# Patient Record
Sex: MALE | Race: WHITE | NOT HISPANIC OR LATINO | Employment: OTHER | URBAN - METROPOLITAN AREA
[De-identification: names, ages, dates, MRNs, and addresses within clinical notes are randomized per-mention and may not be internally consistent; named-entity substitution may affect disease eponyms.]

---

## 2017-03-14 ENCOUNTER — HOSPITAL ENCOUNTER (EMERGENCY)
Facility: HOSPITAL | Age: 57
Discharge: HOME | End: 2017-03-14
Attending: EMERGENCY MEDICINE | Admitting: EMERGENCY MEDICINE

## 2017-03-14 VITALS
SYSTOLIC BLOOD PRESSURE: 145 MMHG | BODY MASS INDEX: 36.53 KG/M2 | OXYGEN SATURATION: 95 % | HEART RATE: 55 BPM | RESPIRATION RATE: 18 BRPM | TEMPERATURE: 97.6 F | HEIGHT: 76 IN | WEIGHT: 300 LBS | DIASTOLIC BLOOD PRESSURE: 85 MMHG

## 2017-03-14 DIAGNOSIS — I10 ESSENTIAL HYPERTENSION: Primary | ICD-10-CM

## 2017-03-14 LAB
ALBUMIN SERPL-MCNC: 4 G/DL (ref 3.5–5.2)
ALBUMIN/GLOB SERPL: 1.4 G/DL
ALP SERPL-CCNC: 45 U/L (ref 39–117)
ALT SERPL W P-5'-P-CCNC: 38 U/L (ref 1–41)
ANION GAP SERPL CALCULATED.3IONS-SCNC: 16.7 MMOL/L
AST SERPL-CCNC: 22 U/L (ref 1–40)
BASOPHILS # BLD AUTO: 0.02 10*3/MM3 (ref 0–0.2)
BASOPHILS NFR BLD AUTO: 0.3 % (ref 0–1.5)
BILIRUB SERPL-MCNC: 0.5 MG/DL (ref 0.1–1.2)
BUN BLD-MCNC: 13 MG/DL (ref 6–20)
BUN/CREAT SERPL: 12.9 (ref 7–25)
CALCIUM SPEC-SCNC: 9.2 MG/DL (ref 8.6–10.5)
CHLORIDE SERPL-SCNC: 101 MMOL/L (ref 98–107)
CO2 SERPL-SCNC: 24.3 MMOL/L (ref 22–29)
CREAT BLD-MCNC: 1.01 MG/DL (ref 0.76–1.27)
DEPRECATED RDW RBC AUTO: 39.9 FL (ref 37–54)
EOSINOPHIL # BLD AUTO: 0.09 10*3/MM3 (ref 0–0.7)
EOSINOPHIL NFR BLD AUTO: 1.3 % (ref 0.3–6.2)
ERYTHROCYTE [DISTWIDTH] IN BLOOD BY AUTOMATED COUNT: 12.6 % (ref 11.5–14.5)
GFR SERPL CREATININE-BSD FRML MDRD: 77 ML/MIN/1.73
GLOBULIN UR ELPH-MCNC: 2.8 GM/DL
GLUCOSE BLD-MCNC: 143 MG/DL (ref 65–99)
HCT VFR BLD AUTO: 39.8 % (ref 40.4–52.2)
HGB BLD-MCNC: 14 G/DL (ref 13.7–17.6)
IMM GRANULOCYTES # BLD: 0.04 10*3/MM3 (ref 0–0.03)
IMM GRANULOCYTES NFR BLD: 0.6 % (ref 0–0.5)
LYMPHOCYTES # BLD AUTO: 1.38 10*3/MM3 (ref 0.9–4.8)
LYMPHOCYTES NFR BLD AUTO: 20.2 % (ref 19.6–45.3)
MCH RBC QN AUTO: 30.4 PG (ref 27–32.7)
MCHC RBC AUTO-ENTMCNC: 35.2 G/DL (ref 32.6–36.4)
MCV RBC AUTO: 86.3 FL (ref 79.8–96.2)
MONOCYTES # BLD AUTO: 0.64 10*3/MM3 (ref 0.2–1.2)
MONOCYTES NFR BLD AUTO: 9.4 % (ref 5–12)
NEUTROPHILS # BLD AUTO: 4.66 10*3/MM3 (ref 1.9–8.1)
NEUTROPHILS NFR BLD AUTO: 68.2 % (ref 42.7–76)
PLATELET # BLD AUTO: 145 10*3/MM3 (ref 140–500)
PMV BLD AUTO: 11.2 FL (ref 6–12)
POTASSIUM BLD-SCNC: 3.7 MMOL/L (ref 3.5–5.2)
PROT SERPL-MCNC: 6.8 G/DL (ref 6–8.5)
RBC # BLD AUTO: 4.61 10*6/MM3 (ref 4.6–6)
SODIUM BLD-SCNC: 142 MMOL/L (ref 136–145)
WBC NRBC COR # BLD: 6.83 10*3/MM3 (ref 4.5–10.7)

## 2017-03-14 PROCEDURE — 99284 EMERGENCY DEPT VISIT MOD MDM: CPT

## 2017-03-14 PROCEDURE — 80053 COMPREHEN METABOLIC PANEL: CPT | Performed by: EMERGENCY MEDICINE

## 2017-03-14 PROCEDURE — 25010000002 HYDRALAZINE PER 20 MG: Performed by: EMERGENCY MEDICINE

## 2017-03-14 PROCEDURE — 85025 COMPLETE CBC W/AUTO DIFF WBC: CPT | Performed by: EMERGENCY MEDICINE

## 2017-03-14 PROCEDURE — 96374 THER/PROPH/DIAG INJ IV PUSH: CPT

## 2017-03-14 RX ORDER — HYDRALAZINE HYDROCHLORIDE 20 MG/ML
20 INJECTION INTRAMUSCULAR; INTRAVENOUS ONCE
Status: COMPLETED | OUTPATIENT
Start: 2017-03-14 | End: 2017-03-14

## 2017-03-14 RX ORDER — IBUPROFEN 800 MG/1
800 TABLET ORAL ONCE
Status: COMPLETED | OUTPATIENT
Start: 2017-03-14 | End: 2017-03-14

## 2017-03-14 RX ORDER — SODIUM CHLORIDE 0.9 % (FLUSH) 0.9 %
10 SYRINGE (ML) INJECTION AS NEEDED
Status: DISCONTINUED | OUTPATIENT
Start: 2017-03-14 | End: 2017-03-14 | Stop reason: HOSPADM

## 2017-03-14 RX ADMIN — IBUPROFEN 800 MG: 800 TABLET ORAL at 05:23

## 2017-03-14 RX ADMIN — HYDRALAZINE HYDROCHLORIDE 20 MG: 20 INJECTION INTRAMUSCULAR; INTRAVENOUS at 03:06

## 2017-03-14 NOTE — ED NOTES
"Patient states \"My head's about to blow off. I checked my blood pressure at home and it was 195/96\"     Leticia Osorio, RN  03/14/17 0027    "

## 2017-03-15 ENCOUNTER — OFFICE VISIT (OUTPATIENT)
Dept: FAMILY MEDICINE CLINIC | Facility: CLINIC | Age: 57
End: 2017-03-15

## 2017-03-15 VITALS
WEIGHT: 305 LBS | HEIGHT: 76 IN | OXYGEN SATURATION: 96 % | BODY MASS INDEX: 37.14 KG/M2 | TEMPERATURE: 98.2 F | RESPIRATION RATE: 20 BRPM | SYSTOLIC BLOOD PRESSURE: 158 MMHG | DIASTOLIC BLOOD PRESSURE: 90 MMHG | HEART RATE: 62 BPM

## 2017-03-15 DIAGNOSIS — R73.9 HYPERGLYCEMIA: ICD-10-CM

## 2017-03-15 DIAGNOSIS — E78.2 MIXED HYPERLIPIDEMIA: Primary | ICD-10-CM

## 2017-03-15 DIAGNOSIS — K21.9 GASTROESOPHAGEAL REFLUX DISEASE WITHOUT ESOPHAGITIS: ICD-10-CM

## 2017-03-15 DIAGNOSIS — I10 ESSENTIAL HYPERTENSION: ICD-10-CM

## 2017-03-15 PROCEDURE — 99214 OFFICE O/P EST MOD 30 MIN: CPT | Performed by: FAMILY MEDICINE

## 2017-03-15 RX ORDER — CLONIDINE HYDROCHLORIDE 0.1 MG/1
0.1 TABLET ORAL NIGHTLY
Qty: 30 TABLET | Refills: 5 | Status: SHIPPED | OUTPATIENT
Start: 2017-03-15 | End: 2021-04-16

## 2017-03-15 NOTE — PATIENT INSTRUCTIONS
This is a very nice gentleman who has an A1c of 6.1%, representing pre-diabetes.  I would like him to exercise and lose weight and cut back on carbohydrates.  He also has elevated blood pressure but I have added clonidine.  I would like him to call if there is any problem.

## 2017-03-15 NOTE — PROGRESS NOTES
Subjective   Gerard Gonzalez is a 56 y.o. male presenting with   Chief Complaint   Patient presents with   • Follow-up     ER f/u for elevated blood pressure   states BP was 149/72 this morning         HPI Comments: This is an obese 56-year-old gentleman who is a nonsmoker but went to [place] for vacation and ate a lot including a lot of sugar and when he came back he developed a headache and found his blood pressure to be very elevated so he went to the emergency department.  He was found to have a blood sugar of 143 and he had been fasting for approximately 6 hours when that was drawn.  He was given hydralazine and his blood pressure came down.  He says that his mother has the same problem was spiking blood pressures and she takes when necessary clonidine.  I told him this was reasonable since he can take his blood pressure anytime he wants.  I did however point out that in the last 7 months he has gained 9 pounds and he already was quite overweight before that.  His BMI is now calculated at 37.    He tells me he can tell when his blood pressure goes up because he gets a headache.  Fortunately he does not have any neurologic symptoms such as weakness or numbness or confusion or difficulty speaking or difficulty controlling his bowel or bladder.  He also reassures me that he does not have any chest pain or palpitations.       The following portions of the patient's history were reviewed and updated as appropriate: current medications, past family history, past medical history, past social history, past surgical history and problem list.    Review of Systems   Neurological: Positive for headaches.   All other systems reviewed and are negative.      Objective   Physical Exam   Constitutional: He is oriented to person, place, and time. He appears well-developed and well-nourished. No distress.   HENT:   Head: Normocephalic and atraumatic.   Mouth/Throat: Oropharynx is clear and moist. No oropharyngeal exudate.    Eyes: EOM are normal. Pupils are equal, round, and reactive to light. No scleral icterus.   Neck: Normal range of motion. Neck supple. No thyromegaly present.   Cardiovascular: Normal rate, regular rhythm, normal heart sounds and intact distal pulses.  Exam reveals no friction rub.    No murmur heard.  Pulmonary/Chest: Effort normal and breath sounds normal.   Musculoskeletal: Normal range of motion. He exhibits edema (trace pitting ankle edema).   Lymphadenopathy:     He has no cervical adenopathy.   Neurological: He is alert and oriented to person, place, and time. No cranial nerve deficit. Coordination normal.   Skin: Skin is warm and dry. No rash noted. He is not diaphoretic.   Psychiatric: He has a normal mood and affect. His behavior is normal.   Nursing note and vitals reviewed.      Assessment/Plan   Gerard was seen today for follow-up.    Diagnoses and all orders for this visit:    Mixed hyperlipidemia    Essential hypertension    Gastroesophageal reflux disease without esophagitis    Hyperglycemia    Other orders  -     CloNIDine (CATAPRES) 0.1 MG tablet; Take 1 tablet by mouth Every Night.                   I would like him to return for another visit in 3 month(s)

## 2017-03-16 ENCOUNTER — DOCUMENTATION (OUTPATIENT)
Dept: SOCIAL WORK | Facility: HOSPITAL | Age: 57
End: 2017-03-16

## 2017-04-26 ENCOUNTER — OFFICE VISIT (OUTPATIENT)
Dept: FAMILY MEDICINE CLINIC | Facility: CLINIC | Age: 57
End: 2017-04-26

## 2017-04-26 VITALS
SYSTOLIC BLOOD PRESSURE: 135 MMHG | DIASTOLIC BLOOD PRESSURE: 82 MMHG | TEMPERATURE: 97.1 F | OXYGEN SATURATION: 96 % | HEIGHT: 75 IN | WEIGHT: 300 LBS | HEART RATE: 53 BPM | BODY MASS INDEX: 37.3 KG/M2

## 2017-04-26 DIAGNOSIS — E78.2 MIXED HYPERLIPIDEMIA: ICD-10-CM

## 2017-04-26 DIAGNOSIS — K21.9 GASTROESOPHAGEAL REFLUX DISEASE WITHOUT ESOPHAGITIS: ICD-10-CM

## 2017-04-26 DIAGNOSIS — R73.9 HYPERGLYCEMIA: ICD-10-CM

## 2017-04-26 DIAGNOSIS — I10 ESSENTIAL HYPERTENSION: ICD-10-CM

## 2017-04-26 DIAGNOSIS — Z00.00 ANNUAL PHYSICAL EXAM: Primary | ICD-10-CM

## 2017-04-26 LAB
CHOLEST SERPL-MCNC: 201 MG/DL (ref 0–200)
HBA1C MFR BLD: 5.66 % (ref 4.8–5.6)
HDLC SERPL-MCNC: 34 MG/DL (ref 40–60)
LDLC SERPL CALC-MCNC: 116 MG/DL (ref 0–100)
LDLC/HDLC SERPL: 3.41 {RATIO}
PSA SERPL-MCNC: 1.82 NG/ML (ref 0–4)
T4 FREE SERPL-MCNC: 1.12 NG/DL (ref 0.93–1.7)
TRIGL SERPL-MCNC: 255 MG/DL (ref 0–150)
TSH SERPL DL<=0.005 MIU/L-ACNC: 0.72 MIU/ML (ref 0.27–4.2)
VLDLC SERPL CALC-MCNC: 51 MG/DL (ref 5–40)

## 2017-04-26 PROCEDURE — 99396 PREV VISIT EST AGE 40-64: CPT | Performed by: FAMILY MEDICINE

## 2017-04-26 RX ORDER — ASPIRIN 81 MG/1
81 TABLET ORAL DAILY
COMMUNITY

## 2017-04-26 RX ORDER — AMPICILLIN TRIHYDRATE 250 MG
1000 CAPSULE ORAL DAILY
COMMUNITY

## 2017-04-26 NOTE — PATIENT INSTRUCTIONS
This is a very nice 56-year-old who is here for routine annual examination.  I will request blood work and notify him when the results are available.  I would like him to call if there is no problem.

## 2017-04-26 NOTE — PROGRESS NOTES
Subjective   Gerard Gonzalez is a 56 y.o. male presenting with   Chief Complaint   Patient presents with   • Annual Exam     pt is fasting but wants to talk about the labs because of his insurance        HPI Comments: This is a 56-year-old  white male nonsmoker who is here for routine annual examination.  He had his colonoscopy last year so he is up-to-date on that.  He had a CBC and CMP in March so he does not want to have that repeated in case it should cause him to have a large bill.  He is fasting for other lab.    He tells me that sometimes in the mornings when he smells something he has a coughing spell and he thinks it is allergies.  I recommended he try taking Zyrtec at night and see if that will help.       The following portions of the patient's history were reviewed and updated as appropriate: current medications, past family history, past medical history, past social history, past surgical history and problem list.    Review of Systems   All other systems reviewed and are negative.      Objective   Physical Exam   Constitutional: He is oriented to person, place, and time. He appears well-developed and well-nourished. No distress.   HENT:   Head: Normocephalic and atraumatic.   Right Ear: External ear normal.   Left Ear: External ear normal.   Mouth/Throat: Oropharynx is clear and moist. No oropharyngeal exudate.   Eyes: EOM are normal. Pupils are equal, round, and reactive to light. No scleral icterus.   Neck: Normal range of motion. Neck supple. No thyromegaly present.   Cardiovascular: Normal rate, regular rhythm, normal heart sounds and intact distal pulses.  Exam reveals no friction rub.    No murmur heard.  Pulmonary/Chest: Effort normal and breath sounds normal. No respiratory distress. He has no wheezes. He exhibits no tenderness.   Abdominal: Soft. Bowel sounds are normal. He exhibits no distension and no mass. There is no tenderness. There is no guarding. Hernia confirmed negative in the  right inguinal area and confirmed negative in the left inguinal area.       Genitourinary: Prostate normal, testes normal and penis normal. Prostate is not enlarged and not tender. Right testis shows no mass and no tenderness. Left testis shows no mass and no tenderness. Circumcised.   Musculoskeletal: Normal range of motion. He exhibits no edema or tenderness.   Lymphadenopathy:     He has no cervical adenopathy. No inguinal adenopathy noted on the right or left side.   Neurological: He is alert and oriented to person, place, and time. No cranial nerve deficit. Coordination normal.   Skin: Skin is warm and dry. Rash: many moles but none look questionable. He is not diaphoretic.   Psychiatric: He has a normal mood and affect. His behavior is normal.   Nursing note and vitals reviewed.      Assessment/Plan   Gerard was seen today for annual exam.    Diagnoses and all orders for this visit:    Annual physical exam  -     PSA  -     Lipid Panel With LDL / HDL Ratio  -     TSH  -     T4, Free  -     Hemoglobin A1c    Mixed hyperlipidemia  -     Lipid Panel With LDL / HDL Ratio    Essential hypertension  -     TSH    Gastroesophageal reflux disease without esophagitis    Hyperglycemia  -     Hemoglobin A1c                   I would like him to return for another visit in 6 month(s)

## 2017-06-26 DIAGNOSIS — I10 ESSENTIAL HYPERTENSION: ICD-10-CM

## 2017-06-26 DIAGNOSIS — K21.9 GASTROESOPHAGEAL REFLUX DISEASE WITHOUT ESOPHAGITIS: ICD-10-CM

## 2017-06-26 RX ORDER — TRIAMTERENE AND HYDROCHLOROTHIAZIDE 37.5; 25 MG/1; MG/1
CAPSULE ORAL
Qty: 90 CAPSULE | Refills: 0 | Status: SHIPPED | OUTPATIENT
Start: 2017-06-26 | End: 2017-09-11 | Stop reason: SDUPTHER

## 2017-06-26 RX ORDER — LOSARTAN POTASSIUM 100 MG/1
TABLET ORAL
Qty: 90 TABLET | Refills: 0 | Status: SHIPPED | OUTPATIENT
Start: 2017-06-26 | End: 2017-09-11 | Stop reason: SDUPTHER

## 2017-08-28 ENCOUNTER — OFFICE VISIT (OUTPATIENT)
Dept: FAMILY MEDICINE CLINIC | Facility: CLINIC | Age: 57
End: 2017-08-28

## 2017-08-28 VITALS
BODY MASS INDEX: 39.17 KG/M2 | HEART RATE: 54 BPM | TEMPERATURE: 98.5 F | HEIGHT: 75 IN | SYSTOLIC BLOOD PRESSURE: 116 MMHG | OXYGEN SATURATION: 97 % | DIASTOLIC BLOOD PRESSURE: 76 MMHG | WEIGHT: 315 LBS

## 2017-08-28 DIAGNOSIS — E78.2 MIXED HYPERLIPIDEMIA: ICD-10-CM

## 2017-08-28 DIAGNOSIS — M25.522 PAIN OF BOTH ELBOWS: ICD-10-CM

## 2017-08-28 DIAGNOSIS — I10 ESSENTIAL HYPERTENSION: ICD-10-CM

## 2017-08-28 DIAGNOSIS — M25.511 ACUTE PAIN OF RIGHT SHOULDER: Primary | ICD-10-CM

## 2017-08-28 DIAGNOSIS — M25.521 PAIN OF BOTH ELBOWS: ICD-10-CM

## 2017-08-28 PROCEDURE — 99213 OFFICE O/P EST LOW 20 MIN: CPT | Performed by: FAMILY MEDICINE

## 2017-08-28 RX ORDER — METHYLPREDNISOLONE 4 MG/1
TABLET ORAL
Qty: 21 TABLET | Refills: 0 | Status: SHIPPED | OUTPATIENT
Start: 2017-08-28 | End: 2018-03-09

## 2017-08-28 NOTE — PATIENT INSTRUCTIONS
This is a very nice 57-year-old who has chronic right shoulder pain and bilateral elbow pain.  I will request physical therapy evaluation and treatment, but if he does not get better I would like him to call and I will refer him to an orthopedist.

## 2017-08-28 NOTE — PROGRESS NOTES
Subjective   Gerard Gonzalez is a 57 y.o. male presenting with   Chief Complaint   Patient presents with   • Elbow Pain     bilateral elbow  but the right elbow is worse    • Shoulder Pain     bliateral shoulder but the right shoulder is worse          HPI Comments: 57-year-old white male dear Andre comes in today with significant bilateral elbow pain and right shoulder pain.  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  He thinks that is what has caused his joint soreness.  He is having significant trouble using his compound bowl and therefore would like a note signed to allow him to use a crossbow.    He denies any arm weakness or numbness or any chest pain or neck pain.       The following portions of the patient's history were reviewed and updated as appropriate: current medications, past family history, past medical history, past social history, past surgical history and problem list.    Review of Systems   Musculoskeletal: Positive for arthralgias.   All other systems reviewed and are negative.      Objective   Physical Exam   Constitutional: He is oriented to person, place, and time. He appears well-developed and well-nourished. No distress.   HENT:   Head: Normocephalic and atraumatic.   Eyes: EOM are normal. Pupils are equal, round, and reactive to light.   Neck: Normal range of motion. Neck supple.   Musculoskeletal: He exhibits tenderness. He exhibits no edema or deformity.        Right shoulder: He exhibits tenderness (he has pain to full extension of his right shoulder) and crepitus. He exhibits normal range of motion, no swelling and no effusion.        Right forearm: He exhibits tenderness (he has tenderness over the lateral epicondyles but full range of motion with no deformity).   Neurological: He is alert and oriented to person, place, and time. No cranial nerve deficit. Coordination normal.   Skin: Skin is warm and dry. He is not diaphoretic.   Psychiatric: He has a normal mood and  affect. His behavior is normal.   Nursing note and vitals reviewed.      Assessment/Plan   Gerard was seen today for elbow pain and shoulder pain.    Diagnoses and all orders for this visit:    Acute pain of right shoulder  -     Ambulatory Referral to Physical Therapy Evaluate and treat    Pain of both elbows  -     Ambulatory Referral to Physical Therapy Evaluate and treat    Other orders  -     MethylPREDNISolone (MEDROL, EMIYL,) 4 MG tablet; Take as directed on package instructions.                   I would like him to return for another visit in 6 month(s)

## 2017-09-11 DIAGNOSIS — K21.9 GASTROESOPHAGEAL REFLUX DISEASE WITHOUT ESOPHAGITIS: ICD-10-CM

## 2017-09-11 DIAGNOSIS — I10 ESSENTIAL HYPERTENSION: ICD-10-CM

## 2017-09-12 RX ORDER — TRIAMTERENE AND HYDROCHLOROTHIAZIDE 37.5; 25 MG/1; MG/1
CAPSULE ORAL
Qty: 90 CAPSULE | Refills: 0 | Status: SHIPPED | OUTPATIENT
Start: 2017-09-12 | End: 2017-12-04 | Stop reason: SDUPTHER

## 2017-09-12 RX ORDER — LOSARTAN POTASSIUM 100 MG/1
TABLET ORAL
Qty: 90 TABLET | Refills: 0 | Status: SHIPPED | OUTPATIENT
Start: 2017-09-12 | End: 2017-12-04 | Stop reason: SDUPTHER

## 2017-12-04 DIAGNOSIS — I10 ESSENTIAL HYPERTENSION: ICD-10-CM

## 2017-12-04 DIAGNOSIS — K21.9 GASTROESOPHAGEAL REFLUX DISEASE WITHOUT ESOPHAGITIS: ICD-10-CM

## 2017-12-04 RX ORDER — TRIAMTERENE AND HYDROCHLOROTHIAZIDE 37.5; 25 MG/1; MG/1
CAPSULE ORAL
Qty: 90 CAPSULE | Refills: 0 | Status: SHIPPED | OUTPATIENT
Start: 2017-12-04 | End: 2018-03-09 | Stop reason: SDUPTHER

## 2017-12-04 RX ORDER — ATENOLOL 100 MG/1
TABLET ORAL
Qty: 90 TABLET | Refills: 0 | Status: SHIPPED | OUTPATIENT
Start: 2017-12-04 | End: 2018-03-09 | Stop reason: SDUPTHER

## 2017-12-04 RX ORDER — LOSARTAN POTASSIUM 100 MG/1
TABLET ORAL
Qty: 90 TABLET | Refills: 0 | Status: SHIPPED | OUTPATIENT
Start: 2017-12-04 | End: 2018-03-09 | Stop reason: SDUPTHER

## 2017-12-21 RX ORDER — BACLOFEN 10 MG/1
TABLET ORAL
Qty: 30 TABLET | Refills: 1 | Status: SHIPPED | OUTPATIENT
Start: 2017-12-21 | End: 2019-11-22

## 2017-12-21 RX ORDER — CYCLOBENZAPRINE HCL 10 MG
TABLET ORAL
Qty: 20 TABLET | Refills: 2 | Status: SHIPPED | OUTPATIENT
Start: 2017-12-21 | End: 2019-11-22

## 2018-03-09 ENCOUNTER — OFFICE VISIT (OUTPATIENT)
Dept: FAMILY MEDICINE CLINIC | Facility: CLINIC | Age: 58
End: 2018-03-09

## 2018-03-09 VITALS
SYSTOLIC BLOOD PRESSURE: 148 MMHG | TEMPERATURE: 98 F | WEIGHT: 315 LBS | OXYGEN SATURATION: 98 % | DIASTOLIC BLOOD PRESSURE: 86 MMHG | RESPIRATION RATE: 16 BRPM | BODY MASS INDEX: 39.17 KG/M2 | HEIGHT: 75 IN | HEART RATE: 60 BPM

## 2018-03-09 DIAGNOSIS — K21.9 GASTROESOPHAGEAL REFLUX DISEASE WITHOUT ESOPHAGITIS: ICD-10-CM

## 2018-03-09 DIAGNOSIS — G89.29 CHRONIC MIDLINE LOW BACK PAIN WITHOUT SCIATICA: ICD-10-CM

## 2018-03-09 DIAGNOSIS — M54.50 CHRONIC MIDLINE LOW BACK PAIN WITHOUT SCIATICA: ICD-10-CM

## 2018-03-09 DIAGNOSIS — I10 ESSENTIAL HYPERTENSION: ICD-10-CM

## 2018-03-09 DIAGNOSIS — R73.9 HYPERGLYCEMIA: ICD-10-CM

## 2018-03-09 DIAGNOSIS — E78.2 MIXED HYPERLIPIDEMIA: Primary | ICD-10-CM

## 2018-03-09 DIAGNOSIS — R06.83 SNORING: ICD-10-CM

## 2018-03-09 PROCEDURE — 99214 OFFICE O/P EST MOD 30 MIN: CPT | Performed by: FAMILY MEDICINE

## 2018-03-09 RX ORDER — ATENOLOL 100 MG/1
100 TABLET ORAL DAILY
Qty: 90 TABLET | Refills: 1 | Status: SHIPPED | OUTPATIENT
Start: 2018-03-09 | End: 2018-03-12 | Stop reason: SDUPTHER

## 2018-03-09 RX ORDER — LOSARTAN POTASSIUM 100 MG/1
100 TABLET ORAL DAILY
Qty: 90 TABLET | Refills: 1 | Status: SHIPPED | OUTPATIENT
Start: 2018-03-09 | End: 2018-03-12 | Stop reason: SDUPTHER

## 2018-03-09 RX ORDER — TRIAMTERENE AND HYDROCHLOROTHIAZIDE 37.5; 25 MG/1; MG/1
1 CAPSULE ORAL EVERY MORNING
Qty: 90 CAPSULE | Refills: 1 | Status: SHIPPED | OUTPATIENT
Start: 2018-03-09 | End: 2018-03-12 | Stop reason: SDUPTHER

## 2018-03-09 RX ORDER — SCOLOPAMINE TRANSDERMAL SYSTEM 1 MG/1
1 PATCH, EXTENDED RELEASE TRANSDERMAL
Qty: 4 PATCH | Refills: 1 | Status: SHIPPED | OUTPATIENT
Start: 2018-03-09 | End: 2019-11-22

## 2018-03-09 RX ORDER — TRAMADOL HYDROCHLORIDE 50 MG/1
50 TABLET ORAL 2 TIMES DAILY PRN
Qty: 60 TABLET | Refills: 2 | Status: SHIPPED | OUTPATIENT
Start: 2018-03-09 | End: 2019-11-22

## 2018-03-09 NOTE — PROGRESS NOTES
Subjective   Gerard Gonzalez is a 57 y.o. male presenting with   Chief Complaint   Patient presents with   • Hypertension     med refill        HPI Comments: This is a 57-year-old  white male nonsmoker who is getting ready to go on a paid hunt for mountain lion and says if he gets 1 he is going to have a cardinal in its mouth since he is a very avid [facility] fan.    He is here for routine follow-up for his blood pressure and cholesterol and blood sugar and sleep apnea and he tells me that he has been snoring a lot lately and he is not using any sort of sleep apnea device.  He needs to have this addressed since I pointed out this could be causing a problem with his blood sugar and blood pressure and weight gain.  He said he would go for testing.    His blood pressure is not very well controlled today but he admits that he has not been doing any sort of exercise lately and he is not on any particular diet.  Apparently he lost weight since I saw him last August, only to gain it all back again.    Hypertension          The following portions of the patient's history were reviewed and updated as appropriate: current medications, past family history, past medical history, past social history, past surgical history and problem list.    Review of Systems   Musculoskeletal: Positive for back pain.   All other systems reviewed and are negative.      Objective   Physical Exam   Constitutional: He is oriented to person, place, and time. He appears well-developed and well-nourished. No distress.   HENT:   Head: Normocephalic and atraumatic.   Mouth/Throat: Oropharynx is clear and moist. No oropharyngeal exudate.   Eyes: EOM are normal. Pupils are equal, round, and reactive to light.   Neck: Normal range of motion. Neck supple. No thyromegaly present.   Cardiovascular: Normal rate, regular rhythm, normal heart sounds and intact distal pulses.  Exam reveals no friction rub.    No murmur  heard.  Pulmonary/Chest: Effort normal and breath sounds normal. No respiratory distress. He has no wheezes.   Abdominal: Soft. Bowel sounds are normal. He exhibits no distension. There is no tenderness.   Musculoskeletal: Normal range of motion. He exhibits edema (trace bilateral pitting ankle edema) and tenderness (some low back pain to range of motion).   Lymphadenopathy:     He has no cervical adenopathy.   Neurological: He is alert and oriented to person, place, and time.   Skin: Skin is warm and dry. He is not diaphoretic.   Psychiatric: He has a normal mood and affect. His behavior is normal.   Nursing note and vitals reviewed.      Assessment/Plan   Gerard was seen today for hypertension.    Diagnoses and all orders for this visit:    Mixed hyperlipidemia  -     Comprehensive Metabolic Panel  -     Lipid Panel With LDL / HDL Ratio    Essential hypertension  -     Comprehensive Metabolic Panel  -     TSH    Hyperglycemia  -     Comprehensive Metabolic Panel  -     Hemoglobin A1c    Snoring  -     Ambulatory Referral to Sleep Medicine    Chronic midline low back pain without sciatica    Gastroesophageal reflux disease without esophagitis    Other orders  -     traMADol (ULTRAM) 50 MG tablet; Take 1 tablet by mouth 2 (Two) Times a Day As Needed for Moderate Pain .                   I would like him to return for another visit in 6 month(s)

## 2018-03-09 NOTE — PATIENT INSTRUCTIONS
This is a very nice 57-year-old who is here for follow-up for blood pressure and snoring and cholesterol and sugar.  I will request blood work and notify him when the results are available.  I would like him to call if there is a problem.

## 2018-03-12 ENCOUNTER — TELEPHONE (OUTPATIENT)
Dept: FAMILY MEDICINE CLINIC | Facility: CLINIC | Age: 58
End: 2018-03-12

## 2018-03-12 DIAGNOSIS — I10 ESSENTIAL HYPERTENSION: ICD-10-CM

## 2018-03-12 DIAGNOSIS — K21.9 GASTROESOPHAGEAL REFLUX DISEASE WITHOUT ESOPHAGITIS: ICD-10-CM

## 2018-03-12 RX ORDER — ATENOLOL 100 MG/1
100 TABLET ORAL DAILY
Qty: 90 TABLET | Refills: 1 | Status: SHIPPED | OUTPATIENT
Start: 2018-03-12 | End: 2019-11-26 | Stop reason: SDUPTHER

## 2018-03-12 RX ORDER — TRIAMTERENE AND HYDROCHLOROTHIAZIDE 37.5; 25 MG/1; MG/1
1 CAPSULE ORAL EVERY MORNING
Qty: 90 CAPSULE | Refills: 1 | Status: SHIPPED | OUTPATIENT
Start: 2018-03-12 | End: 2019-02-12 | Stop reason: SDUPTHER

## 2018-03-12 RX ORDER — LOSARTAN POTASSIUM 100 MG/1
100 TABLET ORAL DAILY
Qty: 90 TABLET | Refills: 1 | Status: SHIPPED | OUTPATIENT
Start: 2018-03-12 | End: 2019-02-12 | Stop reason: SDUPTHER

## 2018-03-12 NOTE — TELEPHONE ENCOUNTER
Pt called [facility]    Atenolol  losartan and   triamterene

## 2019-02-12 DIAGNOSIS — I10 ESSENTIAL HYPERTENSION: ICD-10-CM

## 2019-02-12 DIAGNOSIS — K21.9 GASTROESOPHAGEAL REFLUX DISEASE WITHOUT ESOPHAGITIS: ICD-10-CM

## 2019-02-12 RX ORDER — LOSARTAN POTASSIUM 100 MG/1
TABLET ORAL
Qty: 90 TABLET | Refills: 0 | Status: SHIPPED | OUTPATIENT
Start: 2019-02-12 | End: 2019-11-26 | Stop reason: SDUPTHER

## 2019-02-12 RX ORDER — TRIAMTERENE AND HYDROCHLOROTHIAZIDE 37.5; 25 MG/1; MG/1
CAPSULE ORAL
Qty: 90 CAPSULE | Refills: 0 | Status: SHIPPED | OUTPATIENT
Start: 2019-02-12 | End: 2019-11-26 | Stop reason: SDUPTHER

## 2019-11-22 ENCOUNTER — OFFICE VISIT (OUTPATIENT)
Dept: FAMILY MEDICINE CLINIC | Facility: CLINIC | Age: 59
End: 2019-11-22

## 2019-11-22 VITALS
BODY MASS INDEX: 37.51 KG/M2 | WEIGHT: 308 LBS | DIASTOLIC BLOOD PRESSURE: 64 MMHG | SYSTOLIC BLOOD PRESSURE: 118 MMHG | RESPIRATION RATE: 16 BRPM | HEART RATE: 56 BPM | TEMPERATURE: 98 F | HEIGHT: 76 IN | OXYGEN SATURATION: 96 %

## 2019-11-22 DIAGNOSIS — Z00.00 ANNUAL PHYSICAL EXAM: ICD-10-CM

## 2019-11-22 DIAGNOSIS — R73.9 HYPERGLYCEMIA: ICD-10-CM

## 2019-11-22 DIAGNOSIS — I10 ESSENTIAL HYPERTENSION: Primary | ICD-10-CM

## 2019-11-22 DIAGNOSIS — Z12.5 SCREENING FOR PROSTATE CANCER: ICD-10-CM

## 2019-11-22 DIAGNOSIS — R53.83 FATIGUE, UNSPECIFIED TYPE: ICD-10-CM

## 2019-11-22 PROCEDURE — 99396 PREV VISIT EST AGE 40-64: CPT | Performed by: FAMILY MEDICINE

## 2019-11-22 NOTE — PATIENT INSTRUCTIONS
Patient Instructions    Lab work ordered, will call with results.  Good job with the healthy diet! Keep up the good work.  Recommend routine exercise, at least 30min/day, 5 days/week  Return to clinic in 6 months, and as needed.

## 2019-11-22 NOTE — PROGRESS NOTES
"Subjective   Gerard Gonzalez is a 59 y.o. male.     Chief Complaint   Patient presents with   • Establish Care   • Blood Pressure Check   • Leg Swelling     pt states happened saturday night went to [facility]        History of Present Illness   58 y/o M has PMH of HTN since age 28 yrs.  He is a previous patient of Dr. Lubin. He hasn't been happy with the new PCP that he has been seeing.   He was prescribed amlodipine last February, and just recently started taking it regularly- within the past week.  Initially he had lost weight and didn't think he needed it. He has regained that weight since then, however.  He typically monitors his BP at home, not necessarily every day, usually once a week.   He can tell when his BP is high- because he will get a particular headache.  He takes clonidine only on an as needed basis. He has not needed to take it very much, maybe 3 times in the past 6 years.  Takes triamterene, losartan and amlodipine in AM, and atenolol at night.  He uses CPAP at night for JAKOB, for over a year. JAKOB diagnosed summer 2018.    He had leg swelling one night (Friday) around 10pm, went to the urgent care on Saturday morning.     He says prostate cancer is very common in his family. His father, grandfather, and uncles all have been diagnosed.   His father was recently diagnosed (age 81 yrs).   Denies FH of heart disease.   Mother has dysrhythmia.  PGM had DM    He takes robaxin on occasion- once every week or so, for back pain.  He used tramadol in the past as needed for back pain. Last took one 6 months ago. He was originally prescribed it because of headaches secondary to HTN, and adverse reaction to hydrocodone- causes him to get \"the shakes\".  He goes to [facility] pain clinic where he has been getting trigger point injections.     He changed his diet last week and has been eating healthier.   He has history of bulging discs, OA.  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 He plans to " switch jobs in 6 months to [employer] part time.     Colonoscopy and umbilical hernia repair in 2016- at [facility].   Carotid duplex- last year- unremarkable, per patient.    He lives with his parents in [place] 6 months out of the year, and in [place] for 6 months of the year.  Minimal smoking history.  He drinks alcohol very little, maybe one drink every two weeks or less.  He drinks a gallon of water a day.    Declines flu vaccine today, does not want his arm to be sore for the weekend.   He had Shingrix series this year, and Hep A.  He plans to go to the dermatologist.   He goes to PT for his back. He was recommended hemp oil extract PO supplement to help with back pain, ,but hasn't started it yet since he wanted to know more about it.     Past Medical History:   Diagnosis Date   • Benign essential hypertension    • GERD (gastroesophageal reflux disease)    • Headache      Past Surgical History:   Procedure Laterality Date   • COLONOSCOPY  03/03/2016   • HEMORRHOIDECTOMY     • UMBILICAL HERNIA REPAIR  03/03/2016     Social History     Tobacco Use   • Smoking status: Never Smoker   • Smokeless tobacco: Never Used   Substance Use Topics   • Alcohol use: Yes     Comment: occasional   • Drug use: No     Family History   Problem Relation Age of Onset   • Cancer Father         prostate   • Hypertension Other        Review of Systems   Constitutional: Negative for activity change, appetite change, fatigue, fever, unexpected weight gain and unexpected weight loss.   HENT: Negative for dental problem.    Eyes: Negative for blurred vision.        Wears contacts, sees eye doctor regularly.   Respiratory: Negative for cough, chest tightness, shortness of breath and wheezing.    Cardiovascular: Positive for leg swelling (One time, seee HPI). Negative for chest pain and palpitations.   Gastrointestinal: Positive for GERD (Controlled with Prilosex and diet.). Negative for abdominal pain, blood in stool, constipation,  "diarrhea, nausea and vomiting.   Endocrine: Negative for polydipsia and polyuria.   Genitourinary: Negative for difficulty urinating and nocturia.   Musculoskeletal: Positive for back pain. Negative for arthralgias.   Neurological: Positive for numbness (Of left arm occasionally, for 30 years., off and on.). Negative for dizziness and headache.   Psychiatric/Behavioral: Negative for sleep disturbance and depressed mood. The patient is not nervous/anxious.        Objective   /64   Pulse 56   Temp 98 °F (36.7 °C) (Oral)   Resp 16   Ht 193 cm (76\")   Wt (!) 140 kg (308 lb)   SpO2 96%   BMI 37.49 kg/m²     Physical Exam   Constitutional: He appears well-developed and well-nourished. No distress.   Pleasant, obese male   HENT:   Head: Normocephalic.   Right Ear: External ear normal.   Left Ear: External ear normal.   Mouth/Throat: Oropharynx is clear and moist.   Eyes: Conjunctivae and EOM are normal. Pupils are equal, round, and reactive to light.   Neck: Normal range of motion. Neck supple.   Cardiovascular: Normal rate, regular rhythm, normal heart sounds and intact distal pulses.   No murmur heard.  BP rechecked: 134/76   Pulmonary/Chest: Effort normal and breath sounds normal. No respiratory distress. He has no wheezes. He has no rales.   Abdominal: Soft. Bowel sounds are normal. He exhibits no distension. There is no tenderness. There is no rebound and no guarding.   Musculoskeletal: Normal range of motion.   Lymphadenopathy:     He has no cervical adenopathy.   Neurological: He is alert.   Skin: Skin is warm and dry. Capillary refill takes less than 2 seconds.   Psychiatric: He has a normal mood and affect.   Vitals reviewed.      Procedures    Assessment/Plan   Gerard was seen today for establish care, blood pressure check and leg swelling.    Diagnoses and all orders for this visit:    Essential hypertension  -     Comprehensive Metabolic Panel  -     Lipid Panel    Hyperglycemia  -     Hemoglobin " A1c    Annual physical exam    Fatigue, unspecified type  -     CBC & Differential    Screening for prostate cancer  -     PSA Screen            Patient Instructions    Lab work ordered, will call with results.  Good job with the healthy diet! Keep up the good work.  Recommend routine exercise, at least 30min/day, 5 days/week  Return to clinic in 6 months, and as needed.

## 2019-11-26 DIAGNOSIS — I10 ESSENTIAL HYPERTENSION: ICD-10-CM

## 2019-11-26 DIAGNOSIS — K21.9 GASTROESOPHAGEAL REFLUX DISEASE WITHOUT ESOPHAGITIS: ICD-10-CM

## 2019-11-26 LAB
ALBUMIN SERPL-MCNC: 4.5 G/DL (ref 3.5–5.2)
ALBUMIN/GLOB SERPL: 2 G/DL
ALP SERPL-CCNC: 54 U/L (ref 39–117)
ALT SERPL-CCNC: 30 U/L (ref 1–41)
AST SERPL-CCNC: 19 U/L (ref 1–40)
BASOPHILS # BLD AUTO: 0.04 10*3/MM3 (ref 0–0.2)
BASOPHILS NFR BLD AUTO: 0.8 % (ref 0–1.5)
BILIRUB SERPL-MCNC: 1 MG/DL (ref 0.2–1.2)
BUN SERPL-MCNC: 16 MG/DL (ref 6–20)
BUN/CREAT SERPL: 14.4 (ref 7–25)
CALCIUM SERPL-MCNC: 9.1 MG/DL (ref 8.6–10.5)
CHLORIDE SERPL-SCNC: 100 MMOL/L (ref 98–107)
CHOLEST SERPL-MCNC: 206 MG/DL (ref 0–200)
CO2 SERPL-SCNC: 25.3 MMOL/L (ref 22–29)
CREAT SERPL-MCNC: 1.11 MG/DL (ref 0.76–1.27)
EOSINOPHIL # BLD AUTO: 0.09 10*3/MM3 (ref 0–0.4)
EOSINOPHIL NFR BLD AUTO: 1.9 % (ref 0.3–6.2)
ERYTHROCYTE [DISTWIDTH] IN BLOOD BY AUTOMATED COUNT: 12.7 % (ref 12.3–15.4)
GLOBULIN SER CALC-MCNC: 2.3 GM/DL
GLUCOSE SERPL-MCNC: 127 MG/DL (ref 65–99)
HBA1C MFR BLD: 6.4 % (ref 4.8–5.6)
HCT VFR BLD AUTO: 45.4 % (ref 37.5–51)
HDLC SERPL-MCNC: 32 MG/DL (ref 40–60)
HGB BLD-MCNC: 15.5 G/DL (ref 13–17.7)
IMM GRANULOCYTES # BLD AUTO: 0.01 10*3/MM3 (ref 0–0.05)
IMM GRANULOCYTES NFR BLD AUTO: 0.2 % (ref 0–0.5)
LDLC SERPL CALC-MCNC: 132 MG/DL (ref 0–100)
LYMPHOCYTES # BLD AUTO: 1.39 10*3/MM3 (ref 0.7–3.1)
LYMPHOCYTES NFR BLD AUTO: 28.9 % (ref 19.6–45.3)
MCH RBC QN AUTO: 30.3 PG (ref 26.6–33)
MCHC RBC AUTO-ENTMCNC: 34.1 G/DL (ref 31.5–35.7)
MCV RBC AUTO: 88.8 FL (ref 79–97)
MONOCYTES # BLD AUTO: 0.49 10*3/MM3 (ref 0.1–0.9)
MONOCYTES NFR BLD AUTO: 10.2 % (ref 5–12)
NEUTROPHILS # BLD AUTO: 2.79 10*3/MM3 (ref 1.7–7)
NEUTROPHILS NFR BLD AUTO: 58 % (ref 42.7–76)
NRBC BLD AUTO-RTO: 0 /100 WBC (ref 0–0.2)
PLATELET # BLD AUTO: 192 10*3/MM3 (ref 140–450)
POTASSIUM SERPL-SCNC: 3.9 MMOL/L (ref 3.5–5.2)
PROT SERPL-MCNC: 6.8 G/DL (ref 6–8.5)
PSA SERPL-MCNC: 1.88 NG/ML (ref 0–4)
RBC # BLD AUTO: 5.11 10*6/MM3 (ref 4.14–5.8)
SODIUM SERPL-SCNC: 138 MMOL/L (ref 136–145)
TRIGL SERPL-MCNC: 209 MG/DL (ref 0–150)
VLDLC SERPL CALC-MCNC: 41.8 MG/DL
WBC # BLD AUTO: 4.81 10*3/MM3 (ref 3.4–10.8)

## 2019-11-26 RX ORDER — TRIAMTERENE AND HYDROCHLOROTHIAZIDE 37.5; 25 MG/1; MG/1
1 CAPSULE ORAL EVERY MORNING
Qty: 90 CAPSULE | Refills: 1 | Status: SHIPPED | OUTPATIENT
Start: 2019-11-26 | End: 2020-05-28

## 2019-11-26 RX ORDER — AMLODIPINE BESYLATE 5 MG/1
5 TABLET ORAL DAILY
Qty: 90 TABLET | Refills: 1 | Status: SHIPPED | OUTPATIENT
Start: 2019-11-26 | End: 2020-05-28

## 2019-11-26 RX ORDER — ATENOLOL 100 MG/1
100 TABLET ORAL DAILY
Qty: 90 TABLET | Refills: 1 | Status: SHIPPED | OUTPATIENT
Start: 2019-11-26 | End: 2020-05-28

## 2019-11-26 RX ORDER — LOSARTAN POTASSIUM 100 MG/1
100 TABLET ORAL DAILY
Qty: 90 TABLET | Refills: 1 | Status: SHIPPED | OUTPATIENT
Start: 2019-11-26 | End: 2020-05-28

## 2019-11-27 DIAGNOSIS — E78.2 MIXED HYPERLIPIDEMIA: Primary | ICD-10-CM

## 2019-11-27 DIAGNOSIS — R73.9 HYPERGLYCEMIA: ICD-10-CM

## 2019-11-27 RX ORDER — ATORVASTATIN CALCIUM 20 MG/1
20 TABLET, FILM COATED ORAL DAILY
Qty: 30 TABLET | Refills: 5 | Status: SHIPPED | OUTPATIENT
Start: 2019-11-27 | End: 2020-06-01

## 2019-11-27 NOTE — PROGRESS NOTES
"Discussed results with patient. His blood sugars are running high, hemoglobin A1c is 6.4, which has increased from 5.6 two years ago. He has recently started dieting with the plan to lose weight. I encouraged him to continue this and to limit sugars, carbohydrates, and starches in his diet.   His cholesterol is elevated as well. Considering his potential to develop diabetes, along with HTN and morbid obesity, I recommend starting statin therapy to reduce his risk of CVD. He has not been on statin before. Informed him of possible side effect of muscle pains. He is already on Co enzyme -Q10 which can help prevent myalgias (he states he takes it to \"help his circulation\"). Informed him it is okay to continue and may help prevent muscle pains from statin use. Will plan to recheck lipids and hgb A1c in 6 months, prior to his return visit. Patient is on board with this plan. -CN"

## 2020-01-14 ENCOUNTER — OFFICE VISIT (OUTPATIENT)
Dept: FAMILY MEDICINE CLINIC | Facility: CLINIC | Age: 60
End: 2020-01-14

## 2020-01-14 VITALS
DIASTOLIC BLOOD PRESSURE: 78 MMHG | HEIGHT: 76 IN | SYSTOLIC BLOOD PRESSURE: 148 MMHG | RESPIRATION RATE: 16 BRPM | BODY MASS INDEX: 38.36 KG/M2 | OXYGEN SATURATION: 97 % | HEART RATE: 52 BPM | WEIGHT: 315 LBS | TEMPERATURE: 98.1 F

## 2020-01-14 DIAGNOSIS — T75.3XXD MOTION SICKNESS, SUBSEQUENT ENCOUNTER: ICD-10-CM

## 2020-01-14 DIAGNOSIS — G44.209 TENSION HEADACHE: ICD-10-CM

## 2020-01-14 DIAGNOSIS — I10 ESSENTIAL HYPERTENSION: ICD-10-CM

## 2020-01-14 DIAGNOSIS — E66.01 MORBID OBESITY (HCC): ICD-10-CM

## 2020-01-14 DIAGNOSIS — Z99.89 OSA ON CPAP: Primary | ICD-10-CM

## 2020-01-14 DIAGNOSIS — R00.2 PALPITATIONS: ICD-10-CM

## 2020-01-14 DIAGNOSIS — E78.2 MIXED HYPERLIPIDEMIA: ICD-10-CM

## 2020-01-14 DIAGNOSIS — R60.9 PERIPHERAL EDEMA: ICD-10-CM

## 2020-01-14 DIAGNOSIS — G47.33 OSA ON CPAP: Primary | ICD-10-CM

## 2020-01-14 DIAGNOSIS — R73.9 HYPERGLYCEMIA: ICD-10-CM

## 2020-01-14 PROCEDURE — 99214 OFFICE O/P EST MOD 30 MIN: CPT | Performed by: FAMILY MEDICINE

## 2020-01-14 RX ORDER — SCOLOPAMINE TRANSDERMAL SYSTEM 1 MG/1
1 PATCH, EXTENDED RELEASE TRANSDERMAL
Qty: 4 EACH | Refills: 0 | Status: SHIPPED | OUTPATIENT
Start: 2020-01-14 | End: 2021-03-15 | Stop reason: SDUPTHER

## 2020-01-14 NOTE — PATIENT INSTRUCTIONS
Patient Instructions    Echocardiogram ordered, office will call to schedule  Recommend wearing compression hose daily when you are up and elevating your legs while at rest for at least 30 minutes at a time to help with leg swelling.  Recommend daily antihistamine like Claritin or Zyrtec  Referral sent to sleep medicine, office will call to schedule  Recommend regular exercise at least 30 min/day, 5 days/week  Be sure to take all of your BP medications and keep log of blood pressures  Recommend Ibuprofen 600mg or 800mg as needed for pain, No more than 800mg three times a day  Scopalamine patch prescribed  Return to clinic as needed and for next scheduled appointment in May.

## 2020-01-14 NOTE — PROGRESS NOTES
Subjective   Gerard Gonzalez is a 59 y.o. male.     Chief Complaint   Patient presents with   • Hypertension     BP has beening running higher recently    • Cough     cough, headache and eye eye irritation    • Sleep Apnea     needs new CPC machine       History of Present Illness   Patient forgot to take his diuretic for the past several days, maybe for the past week.  He admits he has not been exercising and did not eat well over adry.  He is concerned about his health.  Last stress test 5-6 years ago.   Denies orthopnea, denies increased leg swelling more than his usual degree of leg swelling. He reports occasional palpitations.  He states that he only takes Clonidine if his BP is increased to 160's which has not been the case. He took it last two nights because he had a headache and thought that it would help.   He has a headache since yesterday-neck and forehead.     He believes his CPAP machine is not working properly.    He gets trigger point injections at [facility] pain clinic which he believes are helpful- he will have them placed in the upper back and shoulders and lower back typically. He will take Robaxin two times per week on average.     He complains of non-productive cough which has improved since taking the Mucinex. Cough has been going on for 2-3 weeks. He has been around     He is going to be on a boat soon, and requests scopalamine patch for motion sickness.    Past Medical History:   Diagnosis Date   • Benign essential hypertension    • GERD (gastroesophageal reflux disease)    • Headache      Past Surgical History:   Procedure Laterality Date   • COLONOSCOPY  03/03/2016   • HEMORRHOIDECTOMY     • UMBILICAL HERNIA REPAIR  03/03/2016     Social History     Tobacco Use   • Smoking status: Never Smoker   • Smokeless tobacco: Never Used   Substance Use Topics   • Alcohol use: Yes     Comment: occasional   • Drug use: No     Family History   Problem Relation Age of Onset   • Cancer Father      "    prostate   • Hypertension Other        Review of Systems   Constitutional: Negative for activity change, appetite change, diaphoresis, fever, unexpected weight gain and unexpected weight loss.   Respiratory: Negative for cough, chest tightness, shortness of breath and wheezing.    Cardiovascular: Positive for palpitations. Negative for chest pain and leg swelling.   Gastrointestinal: Negative for abdominal pain.   Musculoskeletal: Positive for back pain.   Neurological: Positive for headache. Negative for dizziness and light-headedness.       Objective   /78   Pulse 52   Temp 98.1 °F (36.7 °C)   Resp 16   Ht 193 cm (76\")   Wt (!) 143 kg (316 lb)   SpO2 97%   BMI 38.46 kg/m²     Physical Exam   Constitutional: He appears well-developed and well-nourished. No distress.   Pleasant, obese male.   HENT:   Head: Normocephalic and atraumatic.   Eyes: Conjunctivae are normal.   Cardiovascular: Normal rate, regular rhythm, normal heart sounds and intact distal pulses.   Trace to 1+ pitting edema of legs bilaterally, to mid-shin.   Pulmonary/Chest: Effort normal and breath sounds normal. No stridor. No respiratory distress. He has no wheezes. He has no rales.   Abdominal:   Abdomen is obese.   Neurological: He is alert.   Psychiatric: He has a normal mood and affect.   Vitals reviewed.      Procedures    Assessment/Plan   Gerard was seen today for hypertension, cough and sleep apnea.    Diagnoses and all orders for this visit:    JAKOB on CPAP  -     Cancel: Ambulatory Referral to Sleep Medicine  -     Ambulatory Referral to Sleep Medicine    Palpitations  -     Adult Transthoracic Echo Complete W/ Cont if Necessary Per Protocol; Future    Mixed hyperlipidemia    Morbid obesity (CMS/HCC)    Essential hypertension    Hyperglycemia    Motion sickness, subsequent encounter  -     Scopolamine (TRANSDERM-SCOP, 1.5 MG,) 1.5 MG/3DAYS patch; Place 1 patch on the skin as directed by provider Every 72 (Seventy-Two) " Hours.    Peripheral edema    Tension headache       Due to patient's ASCVD risk factors, peripheral edema, and complaint of palpitations, will order echocardiogram.  Instructed patient to make sure he takes all of his medications as directed. Recommended compression hose and leg elevation, as stated in patient instructions.  Recommended Ibuprofen prn headache- I believe he is experiencing tension headache. Other instructions as stated in patient instructions.       Patient Instructions    Echocardiogram ordered, office will call to schedule  Recommend wearing compression hose daily when you are up and elevating your legs while at rest for at least 30 minutes at a time to help with leg swelling.  Recommend daily antihistamine like Claritin or Zyrtec  Referral sent to sleep medicine, office will call to schedule  Recommend regular exercise at least 30 min/day, 5 days/week  Be sure to take all of your BP medications and keep log of blood pressures  Recommend Ibuprofen 600mg or 800mg as needed for pain, No more than 800mg three times a day  Scopalamine patch prescribed  Return to clinic as needed and for next scheduled appointment in May.

## 2020-01-15 PROBLEM — R60.0 PERIPHERAL EDEMA: Status: ACTIVE | Noted: 2020-01-15

## 2020-01-15 PROBLEM — R60.9 PERIPHERAL EDEMA: Status: ACTIVE | Noted: 2020-01-15

## 2020-01-15 PROBLEM — E66.01 MORBID OBESITY: Status: ACTIVE | Noted: 2020-01-15

## 2020-01-29 ENCOUNTER — APPOINTMENT (OUTPATIENT)
Dept: CARDIOLOGY | Facility: HOSPITAL | Age: 60
End: 2020-01-29

## 2020-02-10 ENCOUNTER — OFFICE VISIT (OUTPATIENT)
Dept: SLEEP MEDICINE | Facility: HOSPITAL | Age: 60
End: 2020-02-10

## 2020-02-10 VITALS
BODY MASS INDEX: 37.21 KG/M2 | HEIGHT: 76 IN | HEART RATE: 52 BPM | OXYGEN SATURATION: 98 % | SYSTOLIC BLOOD PRESSURE: 147 MMHG | WEIGHT: 305.6 LBS | DIASTOLIC BLOOD PRESSURE: 77 MMHG

## 2020-02-10 DIAGNOSIS — Z99.89 OSA ON CPAP: Primary | ICD-10-CM

## 2020-02-10 DIAGNOSIS — G47.33 OSA ON CPAP: Primary | ICD-10-CM

## 2020-02-10 PROCEDURE — G0463 HOSPITAL OUTPT CLINIC VISIT: HCPCS

## 2020-02-10 NOTE — PROGRESS NOTES
Group: [facility]         CONSULT NOTE    Patient Identification:  Gerard Gonzalez  59 y.o.  male  1960  [id number]            Requesting physician: Dr. Peter    Reason for Consultation: JAKOB    CC:     History of Present Illness:  Very pleasant gentleman with known history of JAKOB diagnosed [facility] June 2018.  I do not have a copy of the sleep study but patient currently on CPAP 18 cm.  He has his compliance download which shows AHI of 1.1 events per hour with 100% compliance average daily use 7 hours 30 minutes.  Patient tells me that he is doing well with his current machine which is 2 years old.  He has issues with supplies and nobody called him back from [facility].  Currently he feels rested in the morning.  No restless leg no parasomnias reported.  No heavy alcohol abuse reported.  No breakthrough snoring as such reported.  He has occasional dry mouth adjust his mask.  Goes to bed 10 PM gets up 6 AM.  Gets about 7 hours of sleep and feels rested.  No heavy alcohol abuse reported.      Review of Systems  Kidney bladder negative blood in the urine HEENT negative nasal congestion  Musculoskeletal positive painful joints  General negative fever  Cardiopulmonary positive swollen ankles  Neuropsych negative dizziness  GI negative diarrhea  Skin negative rash  Endocrine negative always feeling too cold  Heme lymphatic negative swollen glands  Rest of the 12 point review of system negative  Harvey 1 out of 24 within normal limits  Past Medical History:  Past Medical History:   Diagnosis Date   • Benign essential hypertension    • GERD (gastroesophageal reflux disease)    • Headache        Past Surgical History:  Past Surgical History:   Procedure Laterality Date   • COLONOSCOPY  03/03/2016   • HEMORRHOIDECTOMY     • UMBILICAL HERNIA REPAIR  03/03/2016        Home Meds:    (Not in a hospital admission)    Allergies:  Allergies   Allergen Reactions   • Codeine    •  "Hydrocodone-Acetaminophen    • Valsartan        Social History:   Social History     Socioeconomic History   • Marital status: Single     Spouse name: Not on file   • Number of children: Not on file   • Years of education: Not on file   • Highest education level: Not on file   Tobacco Use   • Smoking status: Never Smoker   • Smokeless tobacco: Never Used   Substance and Sexual Activity   • Alcohol use: Yes     Comment: occasional   • Drug use: No       Family History:  Family History   Problem Relation Age of Onset   • Cancer Father         prostate   • Hypertension Other        Physical Exam:  /77   Pulse 52   Ht 193 cm (76\")   Wt (!) 139 kg (305 lb 9.6 oz)   SpO2 98%   BMI 37.20 kg/m²  Body mass index is 37.2 kg/m². 98% (!) 139 kg (305 lb 9.6 oz)  Physical Exam  Well-developed normal body habitus  Eyes normal conjunctive a pupils reactive to light  ENT Mallampati between 3 and 4 normal nasal exam  Neck midline trachea no thyromegaly  Lungs clear no labored breathing  CVs regular rate rhythm no lower extremity edema  Abdomen soft obese nontender no paraspinal megaly  CNS intact normal sensory exam  Skin no rashes no nodules  Psych oriented to time place and person normal memory  Musculoskeletal no cyanosis no clubbing normal range of motion    LABS:  Lab Results   Component Value Date    CALCIUM 9.1 11/26/2019       No results found for: CKTOTAL, CKMB, CKMBINDEX, TROPONINI, TROPONINT                              Lab Results   Component Value Date    TSH 0.720 04/26/2017     CrCl cannot be calculated (Patient's most recent lab result is older than the maximum 30 days allowed.).         Imaging: I personally visualized the images of scans/x-rays performed within last 3 days.      Assessment:  JAKOB on CPAP  Hypertension  Mild obesity  GERD      Recommendations:  At this point a gentleman with known history of JAKOB currently on CPAP 14 cm.  Reviewed compliance download excellent compliance AHI and leak  He " seems to be doing well clinically  Reeducated patient on JAKOB and sleep hygiene measures  Discussed pathophysiology consequences of untreated sleep apnea  Patient agreeable wishing to proceed and continue using CPAP 14 cm  Sleep hygiene measures discussed in detail  Supplies be renewed for 1 year  Follow-up in 1 year            Silvina Macario MD  2/10/2020  8:25 AM      Much of this encounter note is an electronic transcription/translation of spoken language to printed text using Dragon Software.

## 2020-05-13 DIAGNOSIS — E78.2 MIXED HYPERLIPIDEMIA: ICD-10-CM

## 2020-05-13 DIAGNOSIS — R73.9 HYPERGLYCEMIA: ICD-10-CM

## 2020-05-27 DIAGNOSIS — K21.9 GASTROESOPHAGEAL REFLUX DISEASE WITHOUT ESOPHAGITIS: ICD-10-CM

## 2020-05-27 DIAGNOSIS — I10 ESSENTIAL HYPERTENSION: ICD-10-CM

## 2020-05-28 RX ORDER — TRIAMTERENE AND HYDROCHLOROTHIAZIDE 37.5; 25 MG/1; MG/1
CAPSULE ORAL
Qty: 90 CAPSULE | Refills: 3 | Status: SHIPPED | OUTPATIENT
Start: 2020-05-28 | End: 2021-04-16

## 2020-05-28 RX ORDER — LOSARTAN POTASSIUM 100 MG/1
TABLET ORAL
Qty: 90 TABLET | Refills: 3 | Status: SHIPPED | OUTPATIENT
Start: 2020-05-28 | End: 2021-07-13 | Stop reason: SDUPTHER

## 2020-05-28 RX ORDER — AMLODIPINE BESYLATE 5 MG/1
TABLET ORAL
Qty: 90 TABLET | Refills: 3 | Status: SHIPPED | OUTPATIENT
Start: 2020-05-28 | End: 2021-04-16 | Stop reason: SDUPTHER

## 2020-05-28 RX ORDER — ATENOLOL 100 MG/1
TABLET ORAL
Qty: 90 TABLET | Refills: 3 | Status: SHIPPED | OUTPATIENT
Start: 2020-05-28 | End: 2021-04-16

## 2020-06-01 RX ORDER — ATORVASTATIN CALCIUM 20 MG/1
TABLET, FILM COATED ORAL
Qty: 90 TABLET | Refills: 1 | Status: SHIPPED | OUTPATIENT
Start: 2020-06-01 | End: 2020-12-01 | Stop reason: SDUPTHER

## 2020-10-05 NOTE — PROGRESS NOTES
F/u phone call; spoke w/patient; Pt reports f/u w/ PCP yesterday and he put him on a BP med PRN. Pt has no further needs or concerns at this time. Sanjuanita Ybarra RN    
foot pain/injury

## 2020-10-26 ENCOUNTER — OFFICE VISIT (OUTPATIENT)
Dept: FAMILY MEDICINE CLINIC | Facility: CLINIC | Age: 60
End: 2020-10-26

## 2020-10-26 VITALS
HEIGHT: 76 IN | HEART RATE: 58 BPM | WEIGHT: 315 LBS | SYSTOLIC BLOOD PRESSURE: 144 MMHG | BODY MASS INDEX: 38.36 KG/M2 | DIASTOLIC BLOOD PRESSURE: 82 MMHG | TEMPERATURE: 97.5 F | OXYGEN SATURATION: 98 % | RESPIRATION RATE: 16 BRPM

## 2020-10-26 DIAGNOSIS — I10 ESSENTIAL HYPERTENSION: ICD-10-CM

## 2020-10-26 DIAGNOSIS — J01.90 ACUTE SINUSITIS, RECURRENCE NOT SPECIFIED, UNSPECIFIED LOCATION: Primary | ICD-10-CM

## 2020-10-26 DIAGNOSIS — E66.01 MORBID OBESITY (HCC): ICD-10-CM

## 2020-10-26 PROBLEM — R09.89 BRUIT OF LEFT CAROTID ARTERY: Status: ACTIVE | Noted: 2018-09-13

## 2020-10-26 PROBLEM — M47.817 LUMBOSACRAL SPONDYLOSIS WITHOUT MYELOPATHY: Status: ACTIVE | Noted: 2020-10-26

## 2020-10-26 PROBLEM — M79.18 MYOFASCIAL PAIN: Status: ACTIVE | Noted: 2020-10-26

## 2020-10-26 PROBLEM — G89.29 CHRONIC MIDLINE LOW BACK PAIN WITH RIGHT-SIDED SCIATICA: Status: ACTIVE | Noted: 2018-09-18

## 2020-10-26 PROBLEM — M54.41 CHRONIC MIDLINE LOW BACK PAIN WITH RIGHT-SIDED SCIATICA: Status: ACTIVE | Noted: 2018-09-18

## 2020-10-26 PROBLEM — M51.369 DEGENERATION OF LUMBAR INTERVERTEBRAL DISC: Status: ACTIVE | Noted: 2020-10-26

## 2020-10-26 PROBLEM — M51.36 DEGENERATION OF LUMBAR INTERVERTEBRAL DISC: Status: ACTIVE | Noted: 2020-10-26

## 2020-10-26 PROBLEM — M54.50 LOW BACK PAIN: Status: ACTIVE | Noted: 2020-10-26

## 2020-10-26 PROBLEM — Z87.81 HISTORY OF COMPRESSION FRACTURE OF SPINE: Status: ACTIVE | Noted: 2018-09-18

## 2020-10-26 PROCEDURE — 99213 OFFICE O/P EST LOW 20 MIN: CPT | Performed by: FAMILY MEDICINE

## 2020-10-26 RX ORDER — UREA 10 %
3 LOTION (ML) TOPICAL NIGHTLY
COMMUNITY
End: 2023-02-20 | Stop reason: SDUPTHER

## 2020-10-26 RX ORDER — MV-MIN/VIT C/GLUT/LYSINE/HB124 250-12.5MG
TABLET,CHEWABLE ORAL
COMMUNITY
End: 2021-04-30

## 2020-12-01 ENCOUNTER — OFFICE VISIT (OUTPATIENT)
Dept: FAMILY MEDICINE CLINIC | Facility: CLINIC | Age: 60
End: 2020-12-01

## 2020-12-01 VITALS
DIASTOLIC BLOOD PRESSURE: 82 MMHG | WEIGHT: 315 LBS | SYSTOLIC BLOOD PRESSURE: 161 MMHG | HEART RATE: 60 BPM | TEMPERATURE: 98.2 F | BODY MASS INDEX: 38.36 KG/M2 | OXYGEN SATURATION: 97 % | HEIGHT: 76 IN

## 2020-12-01 DIAGNOSIS — M79.674 PAIN OF RIGHT GREAT TOE: ICD-10-CM

## 2020-12-01 DIAGNOSIS — R73.9 HYPERGLYCEMIA: ICD-10-CM

## 2020-12-01 DIAGNOSIS — E78.2 MIXED HYPERLIPIDEMIA: ICD-10-CM

## 2020-12-01 DIAGNOSIS — Z12.5 SCREENING FOR PROSTATE CANCER: ICD-10-CM

## 2020-12-01 DIAGNOSIS — I10 ESSENTIAL HYPERTENSION: ICD-10-CM

## 2020-12-01 DIAGNOSIS — Z00.00 ROUTINE GENERAL MEDICAL EXAMINATION AT A HEALTH CARE FACILITY: Primary | ICD-10-CM

## 2020-12-01 PROCEDURE — 90471 IMMUNIZATION ADMIN: CPT | Performed by: FAMILY MEDICINE

## 2020-12-01 PROCEDURE — 93000 ELECTROCARDIOGRAM COMPLETE: CPT | Performed by: FAMILY MEDICINE

## 2020-12-01 PROCEDURE — 99213 OFFICE O/P EST LOW 20 MIN: CPT | Performed by: FAMILY MEDICINE

## 2020-12-01 PROCEDURE — 99396 PREV VISIT EST AGE 40-64: CPT | Performed by: FAMILY MEDICINE

## 2020-12-01 PROCEDURE — 90686 IIV4 VACC NO PRSV 0.5 ML IM: CPT | Performed by: FAMILY MEDICINE

## 2020-12-01 RX ORDER — ATORVASTATIN CALCIUM 20 MG/1
20 TABLET, FILM COATED ORAL DAILY
Qty: 90 TABLET | Refills: 1 | Status: SHIPPED | OUTPATIENT
Start: 2020-12-01 | End: 2021-06-02 | Stop reason: SDUPTHER

## 2020-12-01 NOTE — PATIENT INSTRUCTIONS
Calorie Counting for Weight Loss  Calories are units of energy. Your body needs a certain amount of calories from food to keep you going throughout the day. When you eat more calories than your body needs, your body stores the extra calories as fat. When you eat fewer calories than your body needs, your body burns fat to get the energy it needs.  Calorie counting means keeping track of how many calories you eat and drink each day. Calorie counting can be helpful if you need to lose weight. If you make sure to eat fewer calories than your body needs, you should lose weight. Ask your health care provider what a healthy weight is for you.  For calorie counting to work, you will need to eat the right number of calories in a day in order to lose a healthy amount of weight per week. A dietitian can help you determine how many calories you need in a day and will give you suggestions on how to reach your calorie goal.  · A healthy amount of weight to lose per week is usually 1-2 lb (0.5-0.9 kg). This usually means that your daily calorie intake should be reduced by 500-750 calories.  · Eating 1,200 - 1,500 calories per day can help most women lose weight.  · Eating 1,500 - 1,800 calories per day can help most men lose weight.  What is my plan?  My goal is to have __________ calories per day.  If I have this many calories per day, I should lose around __________ pounds per week.  What do I need to know about calorie counting?  In order to meet your daily calorie goal, you will need to:  · Find out how many calories are in each food you would like to eat. Try to do this before you eat.  · Decide how much of the food you plan to eat.  · Write down what you ate and how many calories it had. Doing this is called keeping a food log.  To successfully lose weight, it is important to balance calorie counting with a healthy lifestyle that includes regular activity. Aim for 150 minutes of moderate exercise (such as walking) or 75  minutes of vigorous exercise (such as running) each week.  Where do I find calorie information?    The number of calories in a food can be found on a Nutrition Facts label. If a food does not have a Nutrition Facts label, try to look up the calories online or ask your dietitian for help.  Remember that calories are listed per serving. If you choose to have more than one serving of a food, you will have to multiply the calories per serving by the amount of servings you plan to eat. For example, the label on a package of bread might say that a serving size is 1 slice and that there are 90 calories in a serving. If you eat 1 slice, you will have eaten 90 calories. If you eat 2 slices, you will have eaten 180 calories.  How do I keep a food log?  Immediately after each meal, record the following information in your food log:  · What you ate. Don't forget to include toppings, sauces, and other extras on the food.  · How much you ate. This can be measured in cups, ounces, or number of items.  · How many calories each food and drink had.  · The total number of calories in the meal.  Keep your food log near you, such as in a small notebook in your pocket, or use a mobile [employer] or website  Carpal Tunnel Syndrome    Carpal tunnel syndrome is a condition that causes pain in your hand and arm. The carpal tunnel is a narrow area located on the palm side of your wrist. Repeated wrist motion or certain diseases may cause swelling within the tunnel. This swelling pinches the main nerve in the wrist (median nerve).  What are the causes?  This condition may be caused by:  · Repeated wrist motions.  · Wrist injuries.  · Arthritis.  · A cyst or tumor in the carpal tunnel.  · Fluid buildup during pregnancy.  Sometimes the cause of this condition is not known.  What increases the risk?  The following factors may make you more likely to develop this condition:  · Having a job, such as being a  or a , that requires you to  repeatedly move your wrist in the same motion.  · Being a woman.  · Having certain conditions, such as:  ? Diabetes.  ? Obesity.  ? An underactive thyroid (hypothyroidism).  ? Kidney failure.  What are the signs or symptoms?  Symptoms of this condition include:  · A tingling feeling in your fingers, especially in your thumb, index, and middle fingers.  · Tingling or numbness in your hand.  · An aching feeling in your entire arm, especially when your wrist and elbow are bent for a long time.  · Wrist pain that goes up your arm to your shoulder.  · Pain that goes down into your palm or fingers.  · A weak feeling in your hands. You may have trouble grabbing and holding items.  Your symptoms may feel worse during the night.  How is this diagnosed?  This condition is diagnosed with a medical history and physical exam. You may also have tests, including:  · Electromyogram (EMG). This test measures electrical signals sent by your nerves into the muscles.  · Nerve conduction study. This test measures how well electrical signals pass through your nerves.  · Imaging tests, such as X-rays, ultrasound, and MRI. These tests check for possible causes of your condition.  How is this treated?  This condition may be treated with:  · Lifestyle changes. It is important to stop or change the activity that caused your condition.  · Doing exercise and activities to strengthen your muscles and bones (physical therapy).  · Learning how to use your hand again after diagnosis (occupational therapy).  · Medicines for pain and inflammation. This may include medicine that is injected into your wrist.  · A wrist splint.  · Surgery.  Follow these instructions at home:  If you have a splint:  · Wear the splint as told by your health care provider. Remove it only as told by your health care provider.  · Loosen the splint if your fingers tingle, become numb, or turn cold and blue.  · Keep the splint clean.  · If the splint is not waterproof:  ? Do  not let it get wet.  ? Cover it with a watertight covering when you take a bath or shower.  Managing pain, stiffness, and swelling    · If directed, put ice on the painful area:  ? If you have a removable splint, remove it as told by your health care provider.  ? Put ice in a plastic bag.  ? Place a towel between your skin and the bag.  ? Leave the ice on for 20 minutes, 2-3 times per day.  General instructions  · Take over-the-counter and prescription medicines only as told by your health care provider.  · Rest your wrist from any activity that may be causing your pain. If your condition is work related, talk with your employer about changes that can be made, such as getting a wrist pad to use while typing.  · Do any exercises as told by your health care provider, physical therapist, or occupational therapist.  · Keep all follow-up visits as told by your health care provider. This is important.  Contact a health care provider if:  · You have new symptoms.  · Your pain is not controlled with medicines.  · Your symptoms get worse.  Get help right away if:  · You have severe numbness or tingling in your wrist or hand.  Summary  · Carpal tunnel syndrome is a condition that causes pain in your hand and arm.  · It is usually caused by repeated wrist motions.  · Lifestyle changes and medicines are used to treat carpal tunnel syndrome. Surgery may be recommended.  · Follow your health care provider's instructions about wearing a splint, resting from activity, keeping follow-up visits, and calling for help.  This information is not intended to replace advice given to you by your health care provider. Make sure you discuss any questions you have with your health care provider.  Document Revised: 04/26/2019 Document Reviewed: 04/26/2019  [employer] Patient Education © 2020 [employer]    Low-Purine Eating Plan  A low-purine eating plan involves making food choices to limit your intake of purine. Purine is a kind of uric acid.  Too much uric acid in your blood can cause certain conditions, such as gout and kidney stones. Eating a low-purine diet can help control these conditions.  What are tips for following this plan?  Reading food labels    · Avoid foods with saturated or Trans fat.  · Check the ingredient list of grains-based foods, such as bread and cereal, to make sure that they contain whole grains.  · Check the ingredient list of sauces or soups to make sure they do not contain meat or fish.  · When choosing soft drinks, check the ingredient list to make sure they do not contain high-fructose corn syrup.  Shopping  · Buy plenty of fresh fruits and vegetables.  · Avoid buying canned or fresh fish.  · Buy dairy products labeled as low-fat or nonfat.  · Avoid buying premade or processed foods. These foods are often high in fat, salt (sodium), and added sugar.  Cooking  · Use olive oil instead of butter when cooking. Oils like olive oil, canola oil, and sunflower oil contain healthy fats.  Meal planning  · Learn which foods do or do not affect you. If you find out that a food tends to cause your gout symptoms to flare up, avoid eating that food. You can enjoy foods that do not cause problems. If you have any questions about a food item, talk with your dietitian or health care provider.  · Limit foods high in fat, especially saturated fat. Fat makes it harder for your body to get rid of uric acid.  · Choose foods that are lower in fat and are lean sources of protein.  General guidelines  · Limit alcohol intake to no more than 1 drink a day for nonpregnant women and 2 drinks a day for men. One drink equals 12 oz of beer, 5 oz of wine, or 1½ oz of hard liquor. Alcohol can affect the way your body gets rid of uric acid.  · Drink plenty of water to keep your urine clear or pale yellow. Fluids can help remove uric acid from your body.  · If directed by your health care provider, take a vitamin C supplement.  · Work with your health care  provider and dietitian to develop a plan to achieve or maintain a healthy weight. Losing weight can help reduce uric acid in your blood.  What foods are recommended?  The items listed may not be a complete list. Talk with your dietitian about what dietary choices are best for you.  Foods low in purines  Foods low in purines do not need to be limited. These include:  · All fruits.  · All low-purine vegetables, pickles, and olives.  · Breads, pasta, rice, cornbread, and popcorn. Cake and other baked goods.  · All dairy foods.  · Eggs, nuts, and nut butters.  · Spices and condiments, such as salt, herbs, and vinegar.  · Plant oils, butter, and margarine.  · Water, sugar-free soft drinks, tea, coffee, and cocoa.  · Vegetable-based soups, broths, sauces, and gravies.  Foods moderate in purines  Foods moderate in purines should be limited to the amounts listed.  · ½ cup of asparagus, cauliflower, spinach, mushrooms, or green peas, each day.  · 2/3 cup uncooked oatmeal, each day.  · ¼ cup dry wheat bran or wheat germ, each day.  · 2-3 ounces of meat or poultry, each day.  · 4-6 ounces of shellfish, such as crab, lobster, oysters, or shrimp, each day.  · 1 cup cooked beans, peas, or lentils, each day.  · Soup, broths, or bouillon made from meat or fish. Limit these foods as much as possible.  What foods are not recommended?  The items listed may not be a complete list. Talk with your dietitian about what dietary choices are best for you.  Limit your intake of foods high in purines, including:  · Beer and other alcohol.  · Meat-based gravy or sauce.  · Canned or fresh fish, such as:  ? Anchovies, sardines, herring, and tuna.  ? Mussels and scallops.  ? Codfish, trout, and cherelle.  · Loza.  · Organ meats, such as:  ? Liver or kidney.  ? Tripe.  ? Sweetbreads (thymus gland or pancreas).  · Wild game or goose.  · Yeast or yeast extract supplements.  · Drinks sweetened with high-fructose corn syrup.  Summary  · Eating a  "low-purine diet can help control conditions caused by too much uric acid in the body, such as gout or kidney stones.  · Choose low-purine foods, limit alcohol, and limit foods high in fat.  · You will learn over time which foods do or do not affect you. If you find out that a food tends to cause your gout symptoms to flare up, avoid eating that food.  This information is not intended to replace advice given to you by your health care provider. Make sure you discuss any questions you have with your health care provider.  Document Revised: 11/30/2018 Document Reviewed: 01/31/2018  [employer] Patient Education © 2020 [employer]  . Some programs will calculate calories for you and show you how many calories you have left for the day to meet your goal.  What are some calorie counting tips?    · Use your calories on foods and drinks that will fill you up and not leave you hungry:  ? Some examples of foods that fill you up are nuts and nut butters, vegetables, lean proteins, and high-fiber foods like whole grains. High-fiber foods are foods with more than 5 g fiber per serving.  ? Drinks such as sodas, specialty coffee drinks, alcohol, and juices have a lot of calories, yet do not fill you up.  · Eat nutritious foods and avoid empty calories. Empty calories are calories you get from foods or beverages that do not have many vitamins or protein, such as candy, sweets, and soda. It is better to have a nutritious high-calorie food (such as an avocado) than a food with few nutrients (such as a bag of chips).  · Know how many calories are in the foods you eat most often. This will help you calculate calorie counts faster.  · Pay attention to calories in drinks. Low-calorie drinks include water and unsweetened drinks.  · Pay attention to nutrition labels for \"low fat\" or \"fat free\" foods. These foods sometimes have the same amount of calories or more calories than the full fat versions. They also often have added sugar, starch, " or salt, to make up for flavor that was removed with the fat.  · Find a way of tracking calories that works for you. Get creative. Try different apps or programs if writing down calories does not work for you.  What are some portion control tips?  · Know how many calories are in a serving. This will help you know how many servings of a certain food you can have.  · Use a measuring cup to measure serving sizes. You could also try weighing out portions on a kitchen scale. With time, you will be able to estimate serving sizes for some foods.  · Take some time to put servings of different foods on your favorite plates, bowls, and cups so you know what a serving looks like.  · Try not to eat straight from a bag or box. Doing this can lead to overeating. Put the amount you would like to eat in a cup or on a plate to make sure you are eating the right portion.  · Use smaller plates, glasses, and bowls to prevent overeating.  · Try not to multitask (for example, watch TV or use your computer) while eating. If it is time to eat, sit down at a table and enjoy your food. This will help you to know when you are full. It will also help you to be aware of what you are eating and how much you are eating.  What are tips for following this plan?  Reading food labels  · Check the calorie count compared to the serving size. The serving size may be smaller than what you are used to eating.  · Check the source of the calories. Make sure the food you are eating is high in vitamins and protein and low in saturated and trans fats.  Shopping  · Read nutrition labels while you shop. This will help you make healthy decisions before you decide to purchase your food.  · Make a grocery list and stick to it.  Cooking  · Try to cook your favorite foods in a healthier way. For example, try baking instead of frying.  · Use low-fat dairy products.  Meal planning  · Use more fruits and vegetables. Half of your plate should be fruits and  "vegetables.  · Include lean proteins like poultry and fish.  How do I count calories when eating out?  · Ask for smaller portion sizes.  · Consider sharing an entree and sides instead of getting your own entree.  · If you get your own entree, eat only half. Ask for a box at the beginning of your meal and put the rest of your entree in it so you are not tempted to eat it.  · If calories are listed on the menu, choose the lower calorie options.  · Choose dishes that include vegetables, fruits, whole grains, low-fat dairy products, and lean protein.  · Choose items that are boiled, broiled, grilled, or steamed. Stay away from items that are buttered, battered, fried, or served with cream sauce. Items labeled \"crispy\" are usually fried, unless stated otherwise.  · Choose water, low-fat milk, unsweetened iced tea, or other drinks without added sugar. If you want an alcoholic beverage, choose a lower calorie option such as a glass of wine or light beer.  · Ask for dressings, sauces, and syrups on the side. These are usually high in calories, so you should limit the amount you eat.  · If you want a salad, choose a garden salad and ask for grilled meats. Avoid extra toppings like sloan, cheese, or fried items. Ask for the dressing on the side, or ask for olive oil and vinegar or lemon to use as dressing.  · Estimate how many servings of a food you are given. For example, a serving of cooked rice is ½ cup or about the size of half a baseball. Knowing serving sizes will help you be aware of how much food you are eating at restaurants. The list below tells you how big or small some common portion sizes are based on everyday objects:  ? 1 oz--4 stacked dice.  ? 3 oz--1 deck of cards.  ? 1 tsp--1 die.  ? 1 Tbsp--½ a ping-pong ball.  ? 2 Tbsp--1 ping-pong ball.  ? ½ cup--½ baseball.  ? 1 cup--1 baseball.  Summary  · Calorie counting means keeping track of how many calories you eat and drink each day. If you eat fewer calories " than your body needs, you should lose weight.  · A healthy amount of weight to lose per week is usually 1-2 lb (0.5-0.9 kg). This usually means reducing your daily calorie intake by 500-750 calories.  · The number of calories in a food can be found on a Nutrition Facts label. If a food does not have a Nutrition Facts label, try to look up the calories online or ask your dietitian for help.  · Use your calories on foods and drinks that will fill you up, and not on foods and drinks that will leave you hungry.  · Use smaller plates, glasses, and bowls to prevent overeating.  This information is not intended to replace advice given to you by your health care provider. Make sure you discuss any questions you have with your health care provider.  Document Revised: 09/06/2019 Document Reviewed: 11/17/2017  [employer] Patient Education © 2020 [employer]

## 2020-12-01 NOTE — PROGRESS NOTES
Subjective   Gerard Gonzalez is a 60 y.o. male.     Chief Complaint   Patient presents with   • Annual Exam     CPE, HTN. C/o of swelling in the ball of his right foot unknown time, PSA, numbness in fingers. Thinks it might be nerves.   • Foot Swelling       History of Present Illness Gerard Gonzalez is a 60-year-old male patient came here to reestablish care and for annual physical.  Patient is also complaining of bilateral hand pain and numbness.  Pain is worse in the nighttime.  He is also complaining of numbness and pain in the right foot.  He is complaining of pain at the base of right great toe.  Denies any injury.  Patient saw her primary care physician last week for sinus infection and hypertension follow-up.  Given history of recent weight gain and not following the diet.  He has gained almost 21 during this pandemic but he has recently started watching his diet.    Past Medical History:   Diagnosis Date   • Benign essential hypertension    • GERD (gastroesophageal reflux disease)    • Headache        Past Surgical History:   Procedure Laterality Date   • COLONOSCOPY  03/03/2016   • HEMORRHOIDECTOMY     • UMBILICAL HERNIA REPAIR  03/03/2016       Family History   Problem Relation Age of Onset   • Cancer Father         prostate   • Hypertension Other    • Hypertension Mother    • Cancer Paternal Uncle    • Cancer Paternal Grandfather        Social History     Socioeconomic History   • Marital status: Single     Spouse name: Not on file   • Number of children: Not on file   • Years of education: Not on file   • Highest education level: Not on file   Tobacco Use   • Smoking status: Never Smoker   • Smokeless tobacco: Never Used   Substance and Sexual Activity   • Alcohol use: Yes     Comment: occasional   • Drug use: No   • Sexual activity: Not Currently       The following portions of the patient's history were reviewed and updated as appropriate: allergies, current medications, past family history, past  "medical history, past social history, past surgical history and problem list.    Review of Systems   Constitutional: Positive for fatigue. Negative for activity change, fever, unexpected weight gain and unexpected weight loss.   HENT: Negative for congestion, mouth sores, sinus pressure and sore throat.    Eyes: Negative for blurred vision and visual disturbance.   Respiratory: Negative for cough, chest tightness, shortness of breath and wheezing.    Cardiovascular: Negative for chest pain, palpitations and leg swelling.   Gastrointestinal: Negative for abdominal pain, constipation, diarrhea, nausea, vomiting and indigestion.   Endocrine: Negative for cold intolerance, polydipsia and polyuria.   Genitourinary: Negative for dysuria, frequency, hematuria, urgency and urinary incontinence.   Musculoskeletal: Positive for arthralgias and joint swelling. Negative for back pain, gait problem and neck pain.   Skin: Negative for color change and rash.   Neurological: Negative for dizziness, speech difficulty, weakness, light-headedness, headache and confusion.   Psychiatric/Behavioral: Negative for agitation, sleep disturbance and depressed mood. The patient is not nervous/anxious.            Objective   Vitals:    12/01/20 1006   BP: 161/82   Pulse: 60   Temp: 98.2 °F (36.8 °C)   SpO2: 97%   Weight: (!) 148 kg (326 lb 3.2 oz)   Height: 193 cm (76\")     Body mass index is 39.71 kg/m².  Physical Exam  Constitutional:       Appearance: Normal appearance. He is well-developed.   HENT:      Head: Normocephalic and atraumatic.      Right Ear: Tympanic membrane, ear canal and external ear normal.      Left Ear: Tympanic membrane, ear canal and external ear normal.      Nose: Nose normal.      Mouth/Throat:      Mouth: Mucous membranes are moist.   Eyes:      General: No scleral icterus.     Extraocular Movements: Extraocular movements intact.      Conjunctiva/sclera: Conjunctivae normal.      Pupils: Pupils are equal, round, and " reactive to light.   Neck:      Musculoskeletal: Normal range of motion and neck supple.      Thyroid: No thyromegaly.   Cardiovascular:      Rate and Rhythm: Normal rate and regular rhythm.      Pulses: Normal pulses.      Heart sounds: Normal heart sounds.   Pulmonary:      Effort: Pulmonary effort is normal. No respiratory distress.      Breath sounds: Normal breath sounds. No wheezing or rales.   Abdominal:      General: Bowel sounds are normal. There is no distension.      Palpations: Abdomen is soft. There is no mass.      Tenderness: There is no abdominal tenderness.      Hernia: No hernia is present.   Musculoskeletal: Normal range of motion.         General: No swelling or tenderness.      Right lower leg: No edema.      Left lower leg: No edema.      Comments: Positive tenderness and swelling of right toe  B/l hand negative tinel and Phalen sign   Lymphadenopathy:      Cervical: No cervical adenopathy.   Skin:     General: Skin is warm.   Neurological:      General: No focal deficit present.      Mental Status: He is alert and oriented to person, place, and time.      Cranial Nerves: No cranial nerve deficit.      Sensory: No sensory deficit.      Deep Tendon Reflexes: Reflexes normal.   Psychiatric:         Mood and Affect: Mood normal.         Behavior: Behavior normal.         Thought Content: Thought content normal.         Judgment: Judgment normal.           ECG 12 Lead    Date/Time: 12/1/2020 10:47 AM  Performed by: Catherine Mcpherson MD  Authorized by: Catherine Mcpherson MD   Previous ECG: no previous ECG available  Rhythm: sinus bradycardia  Rate: normal  QRS axis: normal    Clinical impression: normal ECG          Assessment/Plan   Problem List Items Addressed This Visit        Cardiovascular and Mediastinum    Mixed hyperlipidemia    Relevant Orders    Lipid Panel (Completed)    Essential hypertension    Relevant Orders    Comprehensive Metabolic Panel (Completed)       Other    Hyperglycemia    Relevant  Orders    Hemoglobin A1c (Completed)      Other Visit Diagnoses     Routine general medical examination at a health care facility    -  Primary    Relevant Orders    Fluarix/Fluzone/Afluria Quad>6 Months (Completed)    CBC & Differential (Completed)    TSH+Free T4 (Completed)    Urinalysis With Culture If Indicated - Urine, Random Void (Completed)    ECG 12 Lead    Screening for prostate cancer        Relevant Orders    PSA Screen (Completed)    Pain of right great toe        Relevant Orders    Uric Acid (Completed)        Gerard Gonzalez is a 60-year-old male patient with multiple complaints seen today for follow-up and physical and follow-up on hypertension.  Preventive  screening discussed with patient.  His BMI and weight discussed with patient.  He is ready confident that he can low 20 to 30 pounds by doing diet.  During the pandemic he was not watching his diet.  I will screen him for diabetes and hyperlipidemia.  Flu vaccine given to patient today.  He is not sure of his last Tdap but he will check his record.  He is up-to-date with colonoscopy.  On check the PSA today.  He is also Singulair for  Hypertension, not controlled.  Blood pressure is still elevated.  I advised him to continue taking atenolol and losartan.  And increase amlodipine to 10 mg a day.  He will keep his blood pressure log and message us through my chart.  Low-salt diet also recommended to patient.  Pain of right great toe.  Causes discussed with patient including low .  He thinks his pain is getting better.  I will check his uric acid.  Bilateral hand pain, patient: [place] complaining-pain and numbness of his finger.  On the right he is having a numbness and pain in the little and ring finger and then left is having pain on index finger and middle finger.  Causes of the pain and numbness discussed with patient.  Carpal tunnel gloves and her wrist brace recommended to patient.  Information given.  Patient does not get better we will do  bilateral EMG and nerve conduction study.        Return in about 2 weeks (around 12/15/2020), or if symptoms worsen or fail to improve.

## 2020-12-02 LAB
ALBUMIN SERPL-MCNC: 4.5 G/DL (ref 3.5–5.2)
ALBUMIN/GLOB SERPL: 2 G/DL
ALP SERPL-CCNC: 64 U/L (ref 39–117)
ALT SERPL-CCNC: 45 U/L (ref 1–41)
APPEARANCE UR: CLEAR
AST SERPL-CCNC: 24 U/L (ref 1–40)
BACTERIA #/AREA URNS HPF: NORMAL /HPF
BASOPHILS # BLD AUTO: 0.05 10*3/MM3 (ref 0–0.2)
BASOPHILS NFR BLD AUTO: 0.8 % (ref 0–1.5)
BILIRUB SERPL-MCNC: 0.9 MG/DL (ref 0–1.2)
BILIRUB UR QL STRIP: NEGATIVE
BUN SERPL-MCNC: 18 MG/DL (ref 6–20)
BUN/CREAT SERPL: 16.8 (ref 7–25)
CALCIUM SERPL-MCNC: 9.4 MG/DL (ref 8.6–10.5)
CHLORIDE SERPL-SCNC: 99 MMOL/L (ref 98–107)
CHOLEST SERPL-MCNC: 178 MG/DL (ref 0–200)
CO2 SERPL-SCNC: 30.2 MMOL/L (ref 22–29)
COLOR UR: YELLOW
CREAT SERPL-MCNC: 1.07 MG/DL (ref 0.76–1.27)
EOSINOPHIL # BLD AUTO: 0.09 10*3/MM3 (ref 0–0.4)
EOSINOPHIL NFR BLD AUTO: 1.5 % (ref 0.3–6.2)
EPI CELLS #/AREA URNS HPF: NORMAL /HPF (ref 0–10)
ERYTHROCYTE [DISTWIDTH] IN BLOOD BY AUTOMATED COUNT: 12.5 % (ref 12.3–15.4)
GLOBULIN SER CALC-MCNC: 2.3 GM/DL
GLUCOSE SERPL-MCNC: 138 MG/DL (ref 65–99)
GLUCOSE UR QL: NEGATIVE
HBA1C MFR BLD: 7.1 % (ref 4.8–5.6)
HCT VFR BLD AUTO: 46.5 % (ref 37.5–51)
HDLC SERPL-MCNC: 37 MG/DL (ref 40–60)
HGB BLD-MCNC: 15.6 G/DL (ref 13–17.7)
HGB UR QL STRIP: NEGATIVE
IMM GRANULOCYTES # BLD AUTO: 0.02 10*3/MM3 (ref 0–0.05)
IMM GRANULOCYTES NFR BLD AUTO: 0.3 % (ref 0–0.5)
KETONES UR QL STRIP: NEGATIVE
LDLC SERPL CALC-MCNC: 93 MG/DL (ref 0–100)
LEUKOCYTE ESTERASE UR QL STRIP: NEGATIVE
LYMPHOCYTES # BLD AUTO: 1.66 10*3/MM3 (ref 0.7–3.1)
LYMPHOCYTES NFR BLD AUTO: 26.8 % (ref 19.6–45.3)
MCH RBC QN AUTO: 29.5 PG (ref 26.6–33)
MCHC RBC AUTO-ENTMCNC: 33.5 G/DL (ref 31.5–35.7)
MCV RBC AUTO: 87.9 FL (ref 79–97)
MICRO URNS: NORMAL
MICRO URNS: NORMAL
MONOCYTES # BLD AUTO: 0.6 10*3/MM3 (ref 0.1–0.9)
MONOCYTES NFR BLD AUTO: 9.7 % (ref 5–12)
NEUTROPHILS # BLD AUTO: 3.78 10*3/MM3 (ref 1.7–7)
NEUTROPHILS NFR BLD AUTO: 60.9 % (ref 42.7–76)
NITRITE UR QL STRIP: NEGATIVE
NRBC BLD AUTO-RTO: 0 /100 WBC (ref 0–0.2)
PH UR STRIP: 7 [PH] (ref 5–7.5)
PLATELET # BLD AUTO: 178 10*3/MM3 (ref 140–450)
POTASSIUM SERPL-SCNC: 4.2 MMOL/L (ref 3.5–5.2)
PROT SERPL-MCNC: 6.8 G/DL (ref 6–8.5)
PROT UR QL STRIP: NEGATIVE
PSA SERPL-MCNC: 2.08 NG/ML (ref 0–4)
RBC # BLD AUTO: 5.29 10*6/MM3 (ref 4.14–5.8)
RBC #/AREA URNS HPF: NORMAL /HPF (ref 0–2)
SODIUM SERPL-SCNC: 139 MMOL/L (ref 136–145)
SP GR UR: 1.01 (ref 1–1.03)
T4 FREE SERPL-MCNC: 0.96 NG/DL (ref 0.93–1.7)
TRIGL SERPL-MCNC: 287 MG/DL (ref 0–150)
TSH SERPL DL<=0.005 MIU/L-ACNC: 0.88 UIU/ML (ref 0.27–4.2)
URATE SERPL-MCNC: 6.9 MG/DL (ref 3.4–7)
URINALYSIS REFLEX: NORMAL
UROBILINOGEN UR STRIP-MCNC: 0.2 MG/DL (ref 0.2–1)
VLDLC SERPL CALC-MCNC: 48 MG/DL (ref 5–40)
WBC # BLD AUTO: 6.2 10*3/MM3 (ref 3.4–10.8)
WBC #/AREA URNS HPF: NORMAL /HPF (ref 0–5)

## 2020-12-02 NOTE — PROGRESS NOTES
Can u ask him to schedule video/ office visit to [facility] appointment  Hb a1c in diabetic range  Tg high  Liver enzymes elevated slightly

## 2020-12-04 ENCOUNTER — TELEPHONE (OUTPATIENT)
Dept: FAMILY MEDICINE CLINIC | Facility: CLINIC | Age: 60
End: 2020-12-04

## 2020-12-04 NOTE — TELEPHONE ENCOUNTER
Caller: Gerard Gonzalez    Relationship: Self    Best call back number: [phone]     When was the test performed: 12/01/20    Where was the test performed: [facility]    Additional notes: PATIENT WANTING RESULTS OF MOST RECENT LABS

## 2020-12-11 ENCOUNTER — OFFICE VISIT (OUTPATIENT)
Dept: FAMILY MEDICINE CLINIC | Facility: CLINIC | Age: 60
End: 2020-12-11

## 2020-12-11 VITALS
HEIGHT: 76 IN | WEIGHT: 315 LBS | DIASTOLIC BLOOD PRESSURE: 71 MMHG | OXYGEN SATURATION: 97 % | SYSTOLIC BLOOD PRESSURE: 121 MMHG | HEART RATE: 50 BPM | TEMPERATURE: 97.5 F | BODY MASS INDEX: 38.36 KG/M2 | RESPIRATION RATE: 16 BRPM

## 2020-12-11 DIAGNOSIS — E78.2 MIXED HYPERLIPIDEMIA: ICD-10-CM

## 2020-12-11 DIAGNOSIS — M79.674 PAIN OF RIGHT GREAT TOE: ICD-10-CM

## 2020-12-11 DIAGNOSIS — E79.0 HYPERURICEMIA: ICD-10-CM

## 2020-12-11 DIAGNOSIS — E11.9 TYPE 2 DIABETES MELLITUS WITHOUT COMPLICATION, WITHOUT LONG-TERM CURRENT USE OF INSULIN (HCC): Primary | ICD-10-CM

## 2020-12-11 PROCEDURE — 99214 OFFICE O/P EST MOD 30 MIN: CPT | Performed by: FAMILY MEDICINE

## 2020-12-11 NOTE — PROGRESS NOTES
Subjective   Gerard Gonzalez is a 60 y.o. male.     Chief Complaint   Patient presents with   • Diabetes     Discuss labs   • Hyperlipidemia       History of Present Illness Gerard Gonzalez is a 60-year-old male patient came here to discuss abnormal labs.  He had labs done 10 days ago.  Patient with no history of diabetes in the past.  Recently he has he has gained more weight during this pandemic as he was not watching his diet.    Past Medical History:   Diagnosis Date   • Benign essential hypertension    • GERD (gastroesophageal reflux disease)    • Headache        Past Surgical History:   Procedure Laterality Date   • COLONOSCOPY  03/03/2016   • HEMORRHOIDECTOMY     • UMBILICAL HERNIA REPAIR  03/03/2016       Family History   Problem Relation Age of Onset   • Cancer Father         prostate   • Hypertension Other    • Hypertension Mother    • Cancer Paternal Uncle    • Cancer Paternal Grandfather        Social History     Socioeconomic History   • Marital status: Single     Spouse name: Not on file   • Number of children: Not on file   • Years of education: Not on file   • Highest education level: Not on file   Tobacco Use   • Smoking status: Never Smoker   • Smokeless tobacco: Never Used   Substance and Sexual Activity   • Alcohol use: Yes     Comment: occasional   • Drug use: No   • Sexual activity: Not Currently       The following portions of the patient's history were reviewed and updated as appropriate: allergies, current medications, past family history, past medical history, past social history, past surgical history and problem list.    Review of Systems   Constitutional: Negative for activity change, appetite change, fatigue, fever, unexpected weight gain and unexpected weight loss.   HENT: Negative for congestion, ear pain, postnasal drip, rhinorrhea, sinus pressure and sore throat.    Eyes: Negative for blurred vision, discharge, itching and visual disturbance.   Respiratory: Negative for cough,  "chest tightness, shortness of breath and wheezing.    Cardiovascular: Negative for chest pain, palpitations and leg swelling.   Gastrointestinal: Negative for abdominal pain, constipation, diarrhea, nausea and vomiting.   Musculoskeletal: Negative for arthralgias.   Neurological: Negative for headache.   Psychiatric/Behavioral: Negative for agitation, suicidal ideas, depressed mood and stress.           Objective   Vitals:    12/11/20 1302   BP: 121/71   Pulse: 50   Resp: 16   Temp: 97.5 °F (36.4 °C)   SpO2: 97%   Weight: (!) 146 kg (321 lb 4.8 oz)   Height: 193 cm (76\")     Body mass index is 39.11 kg/m².  Physical Exam  Vitals signs and nursing note reviewed.   Constitutional:       General: He is not in acute distress.     Appearance: He is well-developed.   HENT:      Head: Normocephalic and atraumatic.   Eyes:      General:         Right eye: No discharge.         Left eye: No discharge.      Pupils: Pupils are equal, round, and reactive to light.   Neck:      Musculoskeletal: Normal range of motion and neck supple.   Cardiovascular:      Rate and Rhythm: Normal rate and regular rhythm.      Heart sounds: Normal heart sounds.   Pulmonary:      Effort: Pulmonary effort is normal.      Breath sounds: Normal breath sounds. No wheezing or rales.   Abdominal:      General: Bowel sounds are normal.      Palpations: Abdomen is soft. There is no mass.      Tenderness: There is no abdominal tenderness.   Lymphadenopathy:      Cervical: No cervical adenopathy.   Neurological:      Mental Status: He is alert and oriented to person, place, and time.         Assessment/Plan   Problem List Items Addressed This Visit        Cardiovascular and Mediastinum    Mixed hyperlipidemia      Other Visit Diagnoses     Type 2 diabetes mellitus without complication, without long-term current use of insulin (CMS/[facility])    -  Primary    Hyperuricemia        Pain of right great toe          Gerard Gonzalez  Is a 60-year-old male patient " came here to discuss labs.  Patient was seen here 10 days ago and labs were done at that time.  Diabetes type 2, new onset.  His hemoglobin A1c is 7.1 .  I compared his hemoglobin from last visit.  Medication, low-carb diet discussed with patient.  He wants to do diet only.  He is very confident that he can lose weight and bring his sugar down.  During the pandemic he was more not watching his diet.  He has lost 30 to 40 pounds last spring.  I still advised him that we can start him on Metformin along with his diet .  Information on Metformin given to patient.  He will read and call me.  Hyperlipidemia, LDL at goal but he has high triglyceride.  Flaxseed and fish oil recommended to patient.  Hyperuricemia, his uric acid is slightly elevated with his pain in the rigt right foot, I will start him on allopurinol.  Information on medication given to patient.      Return in about 3 months (around 3/11/2021) for Recheck.

## 2020-12-11 NOTE — PATIENT INSTRUCTIONS
Allopurinol injection  What is this medicine?  ALLOPURINOL (al oh PURE i nole) reduces the amount of uric acid the body makes during chemotherapy. Too much uric acid in the blood can cause damage to your kidneys.  This medicine may be used for other purposes; ask your health care provider or pharmacist if you have questions.  COMMON BRAND NAME(S): Aloprim  What should I tell my health care provider before I take this medicine?  They need to know if you have any of these conditions:  · kidney disease  · liver disease  · an unusual or allergic reaction to allopurinol, other medicines, foods, dyes, or preservatives  · pregnant or trying to get pregnant  · breast feeding  How should I use this medicine?  The medicine is for infusion into a vein. It is given by a health care professional in a hospital or clinic setting.  Talk to your pediatrician regarding the use of this medicine in children. Special care may be needed.  Overdosage: If you think you have taken too much of this medicine contact a poison control center or emergency room at once.  NOTE: This medicine is only for you. Do not share this medicine with others.  What if I miss a dose?  This does not apply.  What may interact with this medicine?  Do not take this medicine with the following medication:  · didanosine, ddI  This medicine may also interact with the following medications:  · certain antibiotics like amoxicillin, ampicillin  · certain medicines for cancer  · certain medicines for immunosuppression like azathioprine, cyclosporine, mercaptopurine  · chlorpropamide  · probenecid  · thiazide diuretics, like hydrochlorothiazide  · sulfinpyrazone  · warfarin  This list may not describe all possible interactions. Give your health care provider a list of all the medicines, herbs, non-prescription drugs, or dietary supplements you use. Also tell them if you smoke, drink alcohol, or use illegal drugs. Some items may interact with your medicine.  What should I  watch for while using this medicine?  Your condition will be monitored carefully while you are receiving this medicine.  This medicine may cause serious skin reactions. They can happen weeks to months after starting the medicine. Contact your healthcare provider right away if you notice fevers or flu-like symptoms with a rash. The rash may be red or purple and then turn into blisters or peeling of the skin. Or, you might notice a red rash with swelling of the face, lips or lymph nodes in your neck or under your arms.  You may get drowsy or dizzy. Do not drive, use machinery, or do anything that needs mental alertness until you know how this medicine affects you. Do not stand or sit up quickly, especially if you are an older patient. This reduces the risk of dizzy or fainting spells.  Drink plenty of water while you are taking this medicine. This will help to reduce the risk of getting gout or kidney stones.  What side effects may I notice from receiving this medicine?  Side effects that you should report to your doctor or health care professional as soon as possible:  · allergic reactions like skin rash, itching or hives, swelling of the face, lips, or tongue  · breathing problems  · joint pain  · muscle pain  · rash, fever, and swollen lymph nodes  · redness, blistering, peeling, or loosening of the skin, including inside the mouth  · signs and symptoms of infection like fever or chills; cough; sore throat  · signs and symptoms of kidney injury like trouble passing urine or change in the amount of urine, flank pain  · tingling, numbness in the hands or feet  · unusual bleeding or bruising  · unusually weak or tired  Side effects that usually do not require medical attention (report to your doctor or health care professional if they continue or are bothersome):  · changes in taste  · diarrhea  · drowsiness  · headache  · nausea, vomiting  · stomach upset  This list may not describe all possible side effects. Call  your doctor for medical advice about side effects. You may report side effects to FDA at 5-710-DZR-5847.  Where should I keep my medicine?  This drug is given in a hospital or clinic and will not be stored at home.  NOTE: This sheet is a summary. It may not cover all possible information. If you have questions about this medicine, talk to your doctor, pharmacist, or health care provider.  © 2020 [employer] (2020-03-10 09:38:12)  Metformin extended-release tablets  What is this medicine?  METFORMIN (met FOR min) is used to treat type 2 diabetes. It helps to control blood sugar. Treatment is combined with diet and exercise. This medicine can be used alone or with other medicines for diabetes.  This medicine may be used for other purposes; ask your health care provider or pharmacist if you have questions.  COMMON BRAND NAME(S): Fortamet, Glucophage XR, Glumetza  What should I tell my health care provider before I take this medicine?  They need to know if you have any of these conditions:  · anemia  · dehydration  · heart disease  · frequently drink alcohol-containing beverages  · kidney disease  · liver disease  · polycystic ovary syndrome  · serious infection or injury  · vomiting  · an unusual or allergic reaction to metformin, other medicines, foods, dyes, or preservatives  · pregnant or trying to get pregnant  · breast-feeding  How should I use this medicine?  Take this medicine by mouth with a glass of water. Follow the directions on the prescription label. Take this medicine with food. Take your medicine at regular intervals. Do not take your medicine more often than directed. Do not stop taking except on your doctor's advice.  Talk to your pediatrician regarding the use of this medicine in children. Special care may be needed.  Overdosage: If you think you have taken too much of this medicine contact a poison control center or emergency room at once.  NOTE: This medicine is only for you. Do not share  this medicine with others.  What if I miss a dose?  If you miss a dose, take it as soon as you can. If it is almost time for your next dose, take only that dose. Do not take double or extra doses.  What may interact with this medicine?  Do not take this medicine with any of the following medications:  · certain contrast medicines given before X-rays, CT scans, MRI, or other procedures  · dofetilide  This medicine may also interact with the following medications:  · acetazolamide  · alcohol  · certain antivirals for HIV or hepatitis  · certain medicines for blood pressure, heart disease, irregular heart beat  · cimetidine  · dichlorphenamide  · digoxin  · diuretics  · female hormones, like estrogens or progestins and birth control pills  · glycopyrrolate  · isoniazid  · lamotrigine  · memantine  · methazolamide  · metoclopramide  · midodrine  · niacin  · phenothiazines like chlorpromazine, mesoridazine, prochlorperazine, thioridazine  · phenytoin  · ranolazine  · steroid medicines like prednisone or cortisone  · stimulant medicines for attention disorders, weight loss, or to stay awake  · thyroid medicines  · topiramate  · trospium  · vandetanib  · zonisamide  This list may not describe all possible interactions. Give your health care provider a list of all the medicines, herbs, non-prescription drugs, or dietary supplements you use. Also tell them if you smoke, drink alcohol, or use illegal drugs. Some items may interact with your medicine.  What should I watch for while using this medicine?  Visit your doctor or health care professional for regular checks on your progress.  A test called the HbA1C (A1C) will be monitored. This is a simple blood test. It measures your blood sugar control over the last 2 to 3 months. You will receive this test every 3 to 6 months.  Learn how to check your blood sugar. Learn the symptoms of low and high blood sugar and how to manage them.  Always carry a quick-source of sugar with you  in case you have symptoms of low blood sugar. Examples include hard sugar candy or glucose tablets. Make sure others know that you can choke if you eat or drink when you develop serious symptoms of low blood sugar, such as seizures or unconsciousness. They must get medical help at once.  Tell your doctor or health care professional if you have high blood sugar. You might need to change the dose of your medicine. If you are sick or exercising more than usual, you might need to change the dose of your medicine.  Do not skip meals. Ask your doctor or health care professional if you should avoid alcohol. Many nonprescription cough and cold products contain sugar or alcohol. These can affect blood sugar.  This medicine may cause ovulation in premenopausal women who do not have regular monthly periods. This may increase your chances of becoming pregnant. You should not take this medicine if you become pregnant or think you may be pregnant. Talk with your doctor or health care professional about your birth control options while taking this medicine. Contact your doctor or health care professional right away if you think you are pregnant.  The tablet shell for some brands of this medicine does not dissolve. This is normal. The tablet shell may appear whole in the stool. This is not a cause for concern.  If you are going to need surgery, a MRI, CT scan, or other procedure, tell your doctor that you are taking this medicine. You may need to stop taking this medicine before the procedure.  Wear a medical ID bracelet or chain, and carry a card that describes your disease and details of your medicine and dosage times.  This medicine may cause a decrease in folic acid and vitamin B12. You should make sure that you get enough vitamins while you are taking this medicine. Discuss the foods you eat and the vitamins you take with your health care professional.  What side effects may I notice from receiving this medicine?  Side effects  that you should report to your doctor or health care professional as soon as possible:  · allergic reactions like skin rash, itching or hives, swelling of the face, lips, or tongue  · breathing problems  · feeling faint or lightheaded, falls  · muscle aches or pains  · signs and symptoms of low blood sugar such as feeling anxious, confusion, dizziness, increased hunger, unusually weak or tired, sweating, shakiness, cold, irritable, headache, blurred vision, fast heartbeat, loss of consciousness  · slow or irregular heartbeat  · unusual stomach pain or discomfort  · unusually tired or weak  Side effects that usually do not require medical attention (report to your doctor or health care professional if they continue or are bothersome):  · diarrhea  · headache  · heartburn  · metallic taste in mouth  · nausea  · stomach gas, upset  This list may not describe all possible side effects. Call your doctor for medical advice about side effects. You may report side effects to FDA at 2-174-NHR-4167.  Where should I keep my medicine?  Keep out of the reach of children.  Store at room temperature between 15 and 30 degrees C (59 and 86 degrees F). Protect from light. Throw away any unused medicine after the expiration date.  NOTE: This sheet is a summary. It may not cover all possible information. If you have questions about this medicine, talk to your doctor, pharmacist, or health care provider.  © 2020 [employer] (2019-01-24 18:58:32)

## 2021-01-15 ENCOUNTER — TELEPHONE (OUTPATIENT)
Dept: FAMILY MEDICINE CLINIC | Facility: CLINIC | Age: 61
End: 2021-01-15

## 2021-01-15 NOTE — TELEPHONE ENCOUNTER
Called pt. Informed him Dr. Mcpherson is out of the office. Informed him that he should get a call from [facility] to check on him in the coming days but to contact others he was around recently before the positive, self quarantine for 10-14 days, manage his symptoms with OTC cold and flu meds, watch his BP and if he has worsening Cough, SOB or chest pain to head to ER. Have scheduled pt for F/u virtual visit next week with PCP

## 2021-01-15 NOTE — TELEPHONE ENCOUNTER
PATIENT CALLED AND STATES HE WAS COVID TESTED YESTERDAY AND HE IS POSITIVE. HE STARTED SHOWING SYMPTOMS 1/12/2021    HE DID HAVE A  HEADACHE AND FEVER 99.5 HE DID HAVE TERRIBLE BODY ACHES,THEY ARE GONE NOW.    HE IS CONGESTED, HAS A BIT OF A COUGH. HE IS TAKING CORACIDIN HBP, WHICH HE STARTED TAKING YESTERDAY.    DOES HE NEED TO DO ANYTHING. HE LIVES WITH HIS PARENTS, WHICH IS FATHER IS GETTING THE INFUSION TODAY, WHO IS POSITIVE.  HIS MOTHER IS GETTING TESTED TODAY.     THEY WERE EXPOSED BY SOME FRIENDS. HE WAS EXPOSED FROM HIS MASSAGE THERAPIST.     PLEASE CALL AND ADVISE [phone]

## 2021-01-20 ENCOUNTER — TELEPHONE (OUTPATIENT)
Dept: FAMILY MEDICINE CLINIC | Facility: CLINIC | Age: 61
End: 2021-01-20

## 2021-01-20 NOTE — TELEPHONE ENCOUNTER
PATIENT ADVISE [id number]      PATIENT CALLED STATING:     DX WITH COVID LAST WK    LOST WEIGHT 10LBS  CONGESTION  --NEEDS RX   NO ENERGY NO APPITITE     PATIENT WANTED TO KNOW IF THERE IS ANYTHING HE CAN TAKE OR BE PRESCRIBED TO HELP WITH CONGESTION.     TAKING ZINC, C, CORISEDIN HBP,   NO RELIEF

## 2021-01-21 ENCOUNTER — HOSPITAL ENCOUNTER (OUTPATIENT)
Dept: INFUSION THERAPY | Facility: HOSPITAL | Age: 61
Discharge: HOME | End: 2021-01-21
Admitting: FAMILY MEDICINE

## 2021-01-21 VITALS
WEIGHT: 300 LBS | BODY MASS INDEX: 36.53 KG/M2 | OXYGEN SATURATION: 97 % | TEMPERATURE: 96.6 F | RESPIRATION RATE: 16 BRPM | DIASTOLIC BLOOD PRESSURE: 75 MMHG | SYSTOLIC BLOOD PRESSURE: 143 MMHG | HEART RATE: 47 BPM | HEIGHT: 76 IN

## 2021-01-21 PROCEDURE — M0239 BAMLANIVIMAB-XXXX INFUSION: HCPCS | Performed by: FAMILY MEDICINE

## 2021-01-21 PROCEDURE — 25010000006 BAMLANIVIMAB 700 MG/20ML SOLUTION 20 ML VIAL: Performed by: FAMILY MEDICINE

## 2021-01-21 RX ORDER — EPINEPHRINE 1 MG/ML
0.3 INJECTION, SOLUTION INTRAMUSCULAR; SUBCUTANEOUS ONCE AS NEEDED
Status: DISCONTINUED | OUTPATIENT
Start: 2021-01-21 | End: 2021-01-23 | Stop reason: HOSPADM

## 2021-01-21 RX ORDER — SODIUM CHLORIDE 9 MG/ML
INJECTION, SOLUTION INTRAVENOUS
Status: COMPLETED
Start: 2021-01-21 | End: 2021-01-21

## 2021-01-21 RX ORDER — SODIUM CHLORIDE 9 MG/ML
30 INJECTION, SOLUTION INTRAVENOUS ONCE
Status: COMPLETED | OUTPATIENT
Start: 2021-01-21 | End: 2021-01-21

## 2021-01-21 RX ORDER — METHYLPREDNISOLONE SODIUM SUCCINATE 125 MG/2ML
125 INJECTION, POWDER, LYOPHILIZED, FOR SOLUTION INTRAMUSCULAR; INTRAVENOUS ONCE AS NEEDED
Status: DISCONTINUED | OUTPATIENT
Start: 2021-01-21 | End: 2021-01-23 | Stop reason: HOSPADM

## 2021-01-21 RX ORDER — DIPHENHYDRAMINE HYDROCHLORIDE 50 MG/ML
50 INJECTION INTRAMUSCULAR; INTRAVENOUS ONCE AS NEEDED
Status: DISCONTINUED | OUTPATIENT
Start: 2021-01-21 | End: 2021-01-23 | Stop reason: HOSPADM

## 2021-01-21 RX ADMIN — SODIUM CHLORIDE: 9 INJECTION, SOLUTION INTRAVENOUS at 10:32

## 2021-01-21 RX ADMIN — SODIUM CHLORIDE 700 MG: 9 INJECTION, SOLUTION INTRAVENOUS at 09:08

## 2021-01-21 NOTE — NURSING NOTE
1130 Patient ambulatory to unit for infusion. EAU information given, verbalizes understanding. Infusion completed without problems, vss, Infusion over 1 hour, then patient monitored for 1 hour after infusion. Monitoring consists of cardiac monitor & vital signs every 15 minutes. Cardiac monitor showed sinus rik during infusion. Patient denies c/o, AVS printed & reviewed with patient, discharged ambulatory.

## 2021-01-21 NOTE — PATIENT INSTRUCTIONS
Call [facility] at [phone]   COVID-19  COVID-19 is a respiratory infection that is caused by a virus called severe acute respiratory syndrome coronavirus 2 (SARS-CoV-2). The disease is also known as coronavirus disease or novel coronavirus. In some people, the virus may not cause any symptoms. In others, it may cause a serious infection. The infection can get worse quickly and can lead to complications, such as:  · Pneumonia, or infection of the lungs.  · Acute respiratory distress syndrome or ARDS. This is a condition in which fluid build-up in the lungs prevents the lungs from filling with air and passing oxygen into the blood.  · Acute respiratory failure. This is a condition in which there is not enough oxygen passing from the lungs to the body or when carbon dioxide is not passing from the lungs out of the body.  · Sepsis or septic shock. This is a serious bodily reaction to an infection.  · Blood clotting problems.  · Secondary infections due to bacteria or fungus.  · Organ failure. This is when your body's organs stop working.  The virus that causes COVID-19 is contagious. This means that it can spread from person to person through droplets from coughs and sneezes (respiratory secretions).  What are the causes?  This illness is caused by a virus. You may catch the virus by:  · Breathing in droplets from an infected person. Droplets can be spread by a person breathing, speaking, singing, coughing, or sneezing.  · Touching something, like a table or a doorknob, that was exposed to the virus (contaminated) and then touching your mouth, nose, or eyes.  What increases the risk?  Risk for infection  You are more likely to be infected with this virus if you:  · Are within 6 feet (2 meters) of a person with COVID-19.  · Provide care for or live with a person who is infected with COVID-19.  · Spend time in crowded indoor spaces or live in shared housing.  Risk for serious illness  You are  "more likely to become seriously ill from the virus if you:  · Are 50 years of age or older. The higher your age, the more you are at risk for serious illness.  · Live in a nursing home or long-term care facility.  · Have cancer.  · Have a long-term (chronic) disease such as:  ? Chronic lung disease, including chronic obstructive pulmonary disease or asthma.  ? A long-term disease that lowers your body's ability to fight infection (immunocompromised).  ? Heart disease, including heart failure, a condition in which the arteries that lead to the heart become narrow or blocked (coronary artery disease), a disease which makes the heart muscle thick, weak, or stiff (cardiomyopathy).  ? Diabetes.  ? Chronic kidney disease.  ? Sickle cell disease, a condition in which red blood cells have an abnormal \"sickle\" shape.  ? Liver disease.  · Are obese.  What are the signs or symptoms?  Symptoms of this condition can range from mild to severe. Symptoms may appear any time from 2 to 14 days after being exposed to the virus. They include:  · A fever or chills.  · A cough.  · Difficulty breathing.  · Headaches, body aches, or muscle aches.  · Runny or stuffy (congested) nose.  · A sore throat.  · New loss of taste or smell.  Some people may also have stomach problems, such as nausea, vomiting, or diarrhea.  Other people may not have any symptoms of COVID-19.  How is this diagnosed?  This condition may be diagnosed based on:  · Your signs and symptoms, especially if:  ? You live in an area with a COVID-19 outbreak.  ? You recently traveled to or from an area where the virus is common.  ? You provide care for or live with a person who was diagnosed with COVID-19.  ? You were exposed to a person who was diagnosed with COVID-19.  · A physical exam.  · Lab tests, which may include:  ? Taking a sample of fluid from the back of your nose and throat (nasopharyngeal fluid), your nose, or your throat using a swab.  ? A sample of mucus from " your lungs (sputum).  ? Blood tests.  · Imaging tests, which may include, X-rays, CT scan, or ultrasound.  How is this treated?  At present, there is no medicine to treat COVID-19. Medicines that treat other diseases are being used on a trial basis to see if they are effective against COVID-19.  Your health care provider will talk with you about ways to treat your symptoms. For most people, the infection is mild and can be managed at home with rest, fluids, and over-the-counter medicines.  Treatment for a serious infection usually takes places in a hospital intensive care unit (ICU). It may include one or more of the following treatments. These treatments are given until your symptoms improve.  · Receiving fluids and medicines through an IV.  · Supplemental oxygen. Extra oxygen is given through a tube in the nose, a face mask, or a michael.  · Positioning you to lie on your stomach (prone position). This makes it easier for oxygen to get into the lungs.  · Continuous positive airway pressure (CPAP) or bi-level positive airway pressure (BPAP) machine. This treatment uses mild air pressure to keep the airways open. A tube that is connected to a motor delivers oxygen to the body.  · Ventilator. This treatment moves air into and out of the lungs by using a tube that is placed in your windpipe.  · Tracheostomy. This is a procedure to create a hole in the neck so that a breathing tube can be inserted.  · Extracorporeal membrane oxygenation (ECMO). This procedure gives the lungs a chance to recover by taking over the functions of the heart and lungs. It supplies oxygen to the body and removes carbon dioxide.  Follow these instructions at home:  Lifestyle  · If you are sick, stay home except to get medical care. Your health care provider will tell you how long to stay home. Call your health care provider before you go for medical care.  · Rest at home as told by your health care provider.  · Do not use any products that  contain nicotine or tobacco, such as cigarettes, e-cigarettes, and chewing tobacco. If you need help quitting, ask your health care provider.  · Return to your normal activities as told by your health care provider. Ask your health care provider what activities are safe for you.  General instructions  · Take over-the-counter and prescription medicines only as told by your health care provider.  · Drink enough fluid to keep your urine pale yellow.  · Keep all follow-up visits as told by your health care provider. This is important.  How is this prevented?    There is no vaccine to help prevent COVID-19 infection. However, there are steps you can take to protect yourself and others from this virus.  To protect yourself:   · Do not travel to areas where COVID-19 is a risk. The areas where COVID-19 is reported change often. To identify high-risk areas and travel restrictions, check the [facility] travel website: wwwnc.cdc.gov/travel/notices  · If you live in, or must travel to, an area where COVID-19 is a risk, take precautions to avoid infection.  ? Stay away from people who are sick.  ? Wash your hands often with soap and water for 20 seconds. If soap and water are not available, use an alcohol-based hand .  ? Avoid touching your mouth, face, eyes, or nose.  ? Avoid going out in public, follow guidance from your state and local health authorities.  ? If you must go out in public, wear a cloth face covering or face mask. Make sure your mask covers your nose and mouth.  ? Avoid crowded indoor spaces. Stay at least 6 feet (2 meters) away from others.  ? Disinfect objects and surfaces that are frequently touched every day. This may include:  § Counters and tables.  § Doorknobs and light switches.  § Sinks and faucets.  § Electronics, such as phones, remote controls, keyboards, computers, and tablets.  To protect others:  If you have symptoms of COVID-19, take steps to prevent the virus from spreading to others.  · If  you think you have a COVID-19 infection, contact your health care provider right away. Tell your health care team that you think you may have a COVID-19 infection.  · Stay home. Leave your house only to seek medical care. Do not use public transport.  · Do not travel while you are sick.  · Wash your hands often with soap and water for 20 seconds. If soap and water are not available, use alcohol-based hand .  · Stay away from other members of your household. Let healthy household members care for children and pets, if possible. If you have to care for children or pets, wash your hands often and wear a mask. If possible, stay in your own room, separate from others. Use a different bathroom.  · Make sure that all people in your household wash their hands well and often.  · Cough or sneeze into a tissue or your sleeve or elbow. Do not cough or sneeze into your hand or into the air.  · Wear a cloth face covering or face mask. Make sure your mask covers your nose and mouth.  Where to find more information  · Centers for Disease Control and Prevention: www.cdc.gov/coronavirus/2019-ncov/index.html  · World Health Organization: www.who.int/health-topics/coronavirus  Contact a health care provider if:  · You live in or have traveled to an area where COVID-19 is a risk and you have symptoms of the infection.  · You have had contact with someone who has COVID-19 and you have symptoms of the infection.  Get help right away if:  · You have trouble breathing.  · You have pain or pressure in your chest.  · You have confusion.  · You have bluish lips and fingernails.  · You have difficulty waking from sleep.  · You have symptoms that get worse.  These symptoms may represent a serious problem that is an emergency. Do not wait to see if the symptoms will go away. Get medical help right away. Call your local emergency services (911 in the [place] Do not drive yourself to the hospital. Let the emergency medical personnel know if  "you think you have COVID-19.  Summary  · COVID-19 is a respiratory infection that is caused by a virus. It is also known as coronavirus disease or novel coronavirus. It can cause serious infections, such as pneumonia, acute respiratory distress syndrome, acute respiratory failure, or sepsis.  · The virus that causes COVID-19 is contagious. This means that it can spread from person to person through droplets from breathing, speaking, singing, coughing, or sneezing.  · You are more likely to develop a serious illness if you are 50 years of age or older, have a weak immune system, live in a nursing home, or have chronic disease.  · There is no medicine to treat COVID-19. Your health care provider will talk with you about ways to treat your symptoms.  · Take steps to protect yourself and others from infection. Wash your hands often and disinfect objects and surfaces that are frequently touched every day. Stay away from people who are sick and wear a mask if you are sick.  This information is not intended to replace advice given to you by your health care provider. Make sure you discuss any questions you have with your health care provider.  Document Revised: 10/16/2020 Document Reviewed: 01/23/2020  [employer] Patient Education © 2020 [employer]   if you have any problems or concerns.    We know you have a Choice in healthcare and appreciate you using [facility].  Our purpose is to provide you \"Excellent Care\".  We hope that you will always choose us in the future and continue to recommend us to your family and friends.              "

## 2021-02-01 ENCOUNTER — APPOINTMENT (OUTPATIENT)
Dept: SLEEP MEDICINE | Facility: HOSPITAL | Age: 61
End: 2021-02-01

## 2021-02-25 DIAGNOSIS — R53.83 FATIGUE, UNSPECIFIED TYPE: ICD-10-CM

## 2021-02-25 DIAGNOSIS — I10 ESSENTIAL HYPERTENSION: ICD-10-CM

## 2021-02-25 DIAGNOSIS — E78.2 MIXED HYPERLIPIDEMIA: Primary | ICD-10-CM

## 2021-02-25 DIAGNOSIS — R73.9 HYPERGLYCEMIA: ICD-10-CM

## 2021-03-15 ENCOUNTER — TELEPHONE (OUTPATIENT)
Dept: FAMILY MEDICINE CLINIC | Facility: CLINIC | Age: 61
End: 2021-03-15

## 2021-03-15 DIAGNOSIS — T75.3XXD MOTION SICKNESS, SUBSEQUENT ENCOUNTER: ICD-10-CM

## 2021-03-15 RX ORDER — SCOLOPAMINE TRANSDERMAL SYSTEM 1 MG/1
1 PATCH, EXTENDED RELEASE TRANSDERMAL
Qty: 4 EACH | Refills: 0 | Status: SHIPPED | OUTPATIENT
Start: 2021-03-15 | End: 2021-04-30

## 2021-03-15 RX ORDER — SCOLOPAMINE TRANSDERMAL SYSTEM 1 MG/1
1 PATCH, EXTENDED RELEASE TRANSDERMAL
Qty: 4 EACH | Refills: 0 | Status: SHIPPED | OUTPATIENT
Start: 2021-03-15 | End: 2021-03-15

## 2021-03-15 NOTE — TELEPHONE ENCOUNTER
Caller: Gerard Gonzalez    Relationship: Self    Best call back number: [phone] (H)    Medication needed:   Requested Prescriptions     Pending Prescriptions Disp Refills   • Scopolamine (Transderm-Scop, 1.5 MG,) 1.5 MG/3DAYS patch 4 each 0     Sig: Place 1 patch on the skin as directed by provider Every 72 (Seventy-Two) Hours.       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

## 2021-04-10 LAB
ALBUMIN SERPL-MCNC: 4.6 G/DL (ref 3.5–5.2)
ALBUMIN/GLOB SERPL: 2 G/DL
ALP SERPL-CCNC: 55 U/L (ref 39–117)
ALT SERPL-CCNC: 43 U/L (ref 1–41)
AST SERPL-CCNC: 25 U/L (ref 1–40)
BASOPHILS # BLD AUTO: 0.05 10*3/MM3 (ref 0–0.2)
BASOPHILS NFR BLD AUTO: 0.9 % (ref 0–1.5)
BILIRUB SERPL-MCNC: 0.7 MG/DL (ref 0–1.2)
BUN SERPL-MCNC: 25 MG/DL (ref 6–20)
BUN/CREAT SERPL: 19.5 (ref 7–25)
CALCIUM SERPL-MCNC: 9.2 MG/DL (ref 8.6–10.5)
CHLORIDE SERPL-SCNC: 103 MMOL/L (ref 98–107)
CO2 SERPL-SCNC: 26.5 MMOL/L (ref 22–29)
CREAT SERPL-MCNC: 1.28 MG/DL (ref 0.76–1.27)
EOSINOPHIL # BLD AUTO: 0.11 10*3/MM3 (ref 0–0.4)
EOSINOPHIL NFR BLD AUTO: 1.9 % (ref 0.3–6.2)
ERYTHROCYTE [DISTWIDTH] IN BLOOD BY AUTOMATED COUNT: 13 % (ref 12.3–15.4)
GLOBULIN SER CALC-MCNC: 2.3 GM/DL
GLUCOSE SERPL-MCNC: 124 MG/DL (ref 65–99)
HBA1C MFR BLD: 6.9 % (ref 4.8–5.6)
HCT VFR BLD AUTO: 40.6 % (ref 37.5–51)
HGB BLD-MCNC: 14.3 G/DL (ref 13–17.7)
IMM GRANULOCYTES # BLD AUTO: 0.03 10*3/MM3 (ref 0–0.05)
IMM GRANULOCYTES NFR BLD AUTO: 0.5 % (ref 0–0.5)
LYMPHOCYTES # BLD AUTO: 1.9 10*3/MM3 (ref 0.7–3.1)
LYMPHOCYTES NFR BLD AUTO: 33.4 % (ref 19.6–45.3)
MCH RBC QN AUTO: 31.1 PG (ref 26.6–33)
MCHC RBC AUTO-ENTMCNC: 35.2 G/DL (ref 31.5–35.7)
MCV RBC AUTO: 88.3 FL (ref 79–97)
MICROALBUMIN UR-MCNC: 3.9 UG/ML
MONOCYTES # BLD AUTO: 0.72 10*3/MM3 (ref 0.1–0.9)
MONOCYTES NFR BLD AUTO: 12.7 % (ref 5–12)
NEUTROPHILS # BLD AUTO: 2.88 10*3/MM3 (ref 1.7–7)
NEUTROPHILS NFR BLD AUTO: 50.6 % (ref 42.7–76)
NRBC BLD AUTO-RTO: 0.2 /100 WBC (ref 0–0.2)
PLATELET # BLD AUTO: 192 10*3/MM3 (ref 140–450)
POTASSIUM SERPL-SCNC: 4.4 MMOL/L (ref 3.5–5.2)
PROT SERPL-MCNC: 6.9 G/DL (ref 6–8.5)
RBC # BLD AUTO: 4.6 10*6/MM3 (ref 4.14–5.8)
SODIUM SERPL-SCNC: 141 MMOL/L (ref 136–145)
WBC # BLD AUTO: 5.69 10*3/MM3 (ref 3.4–10.8)

## 2021-04-16 ENCOUNTER — TELEPHONE (OUTPATIENT)
Dept: FAMILY MEDICINE CLINIC | Facility: CLINIC | Age: 61
End: 2021-04-16

## 2021-04-16 ENCOUNTER — OFFICE VISIT (OUTPATIENT)
Dept: FAMILY MEDICINE CLINIC | Facility: CLINIC | Age: 61
End: 2021-04-16

## 2021-04-16 VITALS
WEIGHT: 315 LBS | BODY MASS INDEX: 38.36 KG/M2 | SYSTOLIC BLOOD PRESSURE: 134 MMHG | HEIGHT: 76 IN | DIASTOLIC BLOOD PRESSURE: 76 MMHG | TEMPERATURE: 98 F | OXYGEN SATURATION: 97 % | HEART RATE: 46 BPM

## 2021-04-16 DIAGNOSIS — M54.40 CHRONIC BILATERAL LOW BACK PAIN WITH SCIATICA, SCIATICA LATERALITY UNSPECIFIED: ICD-10-CM

## 2021-04-16 DIAGNOSIS — G89.29 CHRONIC BILATERAL LOW BACK PAIN WITH SCIATICA, SCIATICA LATERALITY UNSPECIFIED: ICD-10-CM

## 2021-04-16 DIAGNOSIS — E11.9 TYPE 2 DIABETES MELLITUS WITHOUT COMPLICATION, WITHOUT LONG-TERM CURRENT USE OF INSULIN (HCC): ICD-10-CM

## 2021-04-16 DIAGNOSIS — I10 ESSENTIAL HYPERTENSION: ICD-10-CM

## 2021-04-16 DIAGNOSIS — M54.40 CHRONIC BILATERAL LOW BACK PAIN WITH SCIATICA, SCIATICA LATERALITY UNSPECIFIED: Primary | ICD-10-CM

## 2021-04-16 DIAGNOSIS — R00.1 BRADYCARDIA: ICD-10-CM

## 2021-04-16 DIAGNOSIS — G89.29 CHRONIC BILATERAL LOW BACK PAIN WITH SCIATICA, SCIATICA LATERALITY UNSPECIFIED: Primary | ICD-10-CM

## 2021-04-16 PROCEDURE — 99214 OFFICE O/P EST MOD 30 MIN: CPT | Performed by: FAMILY MEDICINE

## 2021-04-16 RX ORDER — ATENOLOL 100 MG/1
100 TABLET ORAL DAILY
Qty: 90 TABLET | Refills: 3 | Status: SHIPPED | OUTPATIENT
Start: 2021-04-16 | End: 2021-04-30

## 2021-04-16 RX ORDER — TRAMADOL HYDROCHLORIDE 50 MG/1
50 TABLET ORAL EVERY 8 HOURS PRN
Qty: 30 TABLET | Refills: 0 | Status: SHIPPED | OUTPATIENT
Start: 2021-04-16 | End: 2021-04-30

## 2021-04-16 RX ORDER — TRAMADOL HYDROCHLORIDE 50 MG/1
50 TABLET ORAL EVERY 8 HOURS PRN
Qty: 30 TABLET | Refills: 0 | Status: SHIPPED | OUTPATIENT
Start: 2021-04-16 | End: 2021-04-16 | Stop reason: SDUPTHER

## 2021-04-16 RX ORDER — HYDROCHLOROTHIAZIDE 25 MG/1
25 TABLET ORAL DAILY
Qty: 90 TABLET | Refills: 0 | Status: SHIPPED | OUTPATIENT
Start: 2021-04-16 | End: 2021-07-13

## 2021-04-16 RX ORDER — AMLODIPINE BESYLATE 5 MG/1
10 TABLET ORAL DAILY
Qty: 90 TABLET | Refills: 3 | Status: SHIPPED | OUTPATIENT
Start: 2021-04-16 | End: 2021-11-29

## 2021-04-16 NOTE — PROGRESS NOTES
"Chief Complaint  Diabetes (6 mth f/u. C/o lower back pain) and Back Pain    Subjective          Gerard Gonzalez presents to [facility]  History of Present Illness came here for follow-up on hypertension, hyperlipidemia and hyperglycemia.  Patient is also complaining of back pain.  He has a history of chronic back pain.  Recently had some nerve block by pain management refusing to go to pain management.  Denies any urinary or stool retention.  Patient has lost weight since his last visit.  He has changed his diet.  He had labs done prior to this visit.    Objective   Vital Signs:   /76   Pulse (!) 46   Temp 98 °F (36.7 °C)   Ht 193 cm (76\")   Wt (!) 144 kg (317 lb)   SpO2 97%   BMI 38.59 kg/m²     Physical Exam  Constitutional:       Appearance: Normal appearance. He is well-developed.   HENT:      Head: Normocephalic and atraumatic.      Right Ear: Tympanic membrane, ear canal and external ear normal.      Left Ear: Tympanic membrane, ear canal and external ear normal.      Nose: Nose normal.      Mouth/Throat:      Mouth: Mucous membranes are moist.   Eyes:      General: No scleral icterus.     Extraocular Movements: Extraocular movements intact.      Conjunctiva/sclera: Conjunctivae normal.      Pupils: Pupils are equal, round, and reactive to light.   Neck:      Thyroid: No thyromegaly.   Cardiovascular:      Rate and Rhythm: Normal rate and regular rhythm.      Pulses: Normal pulses.      Heart sounds: Normal heart sounds.   Pulmonary:      Effort: Pulmonary effort is normal. No respiratory distress.      Breath sounds: Normal breath sounds. No wheezing or rales.   Abdominal:      General: Bowel sounds are normal. There is no distension.      Palpations: Abdomen is soft. There is no mass.      Tenderness: There is no abdominal tenderness.      Hernia: No hernia is present.   Musculoskeletal:         General: No swelling or tenderness. Normal range of motion.      " Cervical back: Normal range of motion and neck supple.   Lymphadenopathy:      Cervical: No cervical adenopathy.   Skin:     General: Skin is warm.   Neurological:      General: No focal deficit present.      Mental Status: He is alert and oriented to person, place, and time.      Cranial Nerves: No cranial nerve deficit.      Sensory: No sensory deficit.      Deep Tendon Reflexes: Reflexes normal.   Psychiatric:         Mood and Affect: Mood normal.         Behavior: Behavior normal.         Thought Content: Thought content normal.         Judgment: Judgment normal.        Result Review :     A1C Last 3 Results    HGBA1C Last 3 Results 12/1/20 4/9/21   Hemoglobin A1C 7.10 (A) 6.90 (A)   (A) Abnormal value       Comments are available for some flowsheets but are not being displayed.           Lab Results   Component Value Date    GLUCOSE 143 (H) 03/14/2017    BUN 25 (H) 04/09/2021    CREATININE 1.28 (H) 04/09/2021    EGFRIFNONA 58 (L) 04/09/2021    EGFRIFAFRI 70 04/09/2021    BCR 19.5 04/09/2021    K 4.4 04/09/2021    CO2 26.5 04/09/2021    CALCIUM 9.2 04/09/2021    PROTENTOTREF 6.9 04/09/2021    ALBUMIN 4.60 04/09/2021    LABIL2 2.0 04/09/2021    AST 25 04/09/2021    ALT 43 (H) 04/09/2021               Assessment and Plan    Diagnoses and all orders for this visit:    1. Chronic bilateral low back pain with sciatica, sciatica laterality unspecified (Primary)  -     Ambulatory Referral to Physical Therapy Evaluate and treat  -     traMADol (ULTRAM) 50 MG tablet; Take 1 tablet by mouth Every 8 (Eight) Hours As Needed for Moderate Pain .  Dispense: 30 tablet; Refill: 0    2. Essential hypertension  -     atenolol (TENORMIN) 100 MG tablet; Take 1 tablet by mouth Daily. Pt will start taking 1/2 po qd  Dispense: 90 tablet; Refill: 3    3. Type 2 diabetes mellitus without complication, without long-term current use of insulin (CMS/[facility])    4. Bradycardia    Other orders  -     hydroCHLOROthiazide (HYDRODIURIL) 25 MG  tablet; Take 1 tablet by mouth Daily.  Dispense: 90 tablet; Refill: 0  -     amLODIPine (NORVASC) 5 MG tablet; Take 2 tablets by mouth Daily.  Dispense: 90 tablet; Refill: 3      Gerard Gonzalez  Is a 60-year-old male patient came here for follow-up on  Hypertension, blood pressure at goal.  He has increased creatinine and BUN.  I advised him to increase p.o. intake.  I will change his Maxide to hydrochlorothiazide.  Also he is on atenolol 100 mg a day.  Patient is bradycardic though he denies any symptoms I will decrease his atenolol to 50 mg a day and increase his amlodipine to 10 mg a day.  We will recheck BMP and blood pressure in 6 to 8 weeks.  Diabetes type 2, diet controlled only, hemoglobin A1c has dropped to 6.9 without the medication.  I advised him to continue diet and weight loss we will recheck hemoglobin A1c in 3-month.  Low back pain, history of chronic back pain.  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  He was taking ibuprofen as needed for pain.  He has tried tramadol as needed and Flexeril for pain and it has worked for her back pain is refusing to go to pain management.  I will give him 30 tramadol as needed for pain.              Follow Up   No follow-ups on file.  Patient was given instructions and counseling regarding his condition or for health maintenance advice. Please see specific information pulled into the AVS if appropriate.

## 2021-04-16 NOTE — TELEPHONE ENCOUNTER
Pharmacy Name: [facility]    Pharmacy representative name: ALETHEA    Pharmacy representative phone number: [phone] REF #[id number]    What medication are you calling in regards to: TRAMADOL    What question does the pharmacy have: REPRESENTATIVE WASN'T SURE IF THIS ORDER WAS SUBMITTED IN ERROR AND WAS MEANT FOR A DIFFERENT PHARMACY. IT WAS ONLY MADE OUT FOR A 10 DAY SUPPLY. REPRESENTATIVE STATED THERE IS A [employer] LISTED SO THERE COULD BE A CROSS SENSITIVITY ISSUE. REPRESENTATIVE ALSO STATED THAT IF THE PATIENT IS OPIOID NAIVE THEY WOULD ONLY BE ABLE TO PROVIDE A 7 DAY SUPPLY PER CDC GUIDELINES.    Who is the provider that prescribed the medication: ANTONY FIGUEROA    Additional notes: PLEASE CALL BACK [phone] REFERENCE NUMBER: [id number]

## 2021-04-19 DIAGNOSIS — R79.89 ELEVATED LFTS: Primary | ICD-10-CM

## 2021-04-30 ENCOUNTER — OFFICE VISIT (OUTPATIENT)
Dept: INTERNAL MEDICINE | Facility: CLINIC | Age: 61
End: 2021-04-30

## 2021-04-30 VITALS
TEMPERATURE: 98.7 F | BODY MASS INDEX: 38.29 KG/M2 | WEIGHT: 314.4 LBS | OXYGEN SATURATION: 98 % | HEART RATE: 88 BPM | DIASTOLIC BLOOD PRESSURE: 74 MMHG | HEIGHT: 76 IN | SYSTOLIC BLOOD PRESSURE: 138 MMHG

## 2021-04-30 DIAGNOSIS — M54.41 CHRONIC MIDLINE LOW BACK PAIN WITH RIGHT-SIDED SCIATICA: ICD-10-CM

## 2021-04-30 DIAGNOSIS — E66.01 MORBID OBESITY (HCC): ICD-10-CM

## 2021-04-30 DIAGNOSIS — I10 ESSENTIAL HYPERTENSION: Primary | ICD-10-CM

## 2021-04-30 DIAGNOSIS — M47.814 THORACIC SPONDYLOSIS WITHOUT MYELOPATHY: ICD-10-CM

## 2021-04-30 DIAGNOSIS — G89.29 CHRONIC MIDLINE LOW BACK PAIN WITH RIGHT-SIDED SCIATICA: ICD-10-CM

## 2021-04-30 DIAGNOSIS — R73.03 BORDERLINE TYPE 2 DIABETES MELLITUS: ICD-10-CM

## 2021-04-30 DIAGNOSIS — K21.9 GASTROESOPHAGEAL REFLUX DISEASE WITHOUT ESOPHAGITIS: ICD-10-CM

## 2021-04-30 DIAGNOSIS — Z99.89 OSA ON CPAP: ICD-10-CM

## 2021-04-30 DIAGNOSIS — E78.2 MIXED HYPERLIPIDEMIA: ICD-10-CM

## 2021-04-30 DIAGNOSIS — R60.9 PERIPHERAL EDEMA: ICD-10-CM

## 2021-04-30 DIAGNOSIS — G47.33 OSA ON CPAP: ICD-10-CM

## 2021-04-30 DIAGNOSIS — R79.89 ABNORMAL SERUM CREATININE LEVEL: ICD-10-CM

## 2021-04-30 DIAGNOSIS — M25.571 CHRONIC PAIN OF RIGHT ANKLE: ICD-10-CM

## 2021-04-30 DIAGNOSIS — G89.29 CHRONIC PAIN OF RIGHT ANKLE: ICD-10-CM

## 2021-04-30 PROBLEM — M25.522 PAIN OF BOTH ELBOWS: Status: RESOLVED | Noted: 2017-08-28 | Resolved: 2021-04-30

## 2021-04-30 PROBLEM — M25.511 ACUTE PAIN OF RIGHT SHOULDER: Status: RESOLVED | Noted: 2017-08-28 | Resolved: 2021-04-30

## 2021-04-30 PROBLEM — M54.50 LOW BACK PAIN: Status: RESOLVED | Noted: 2020-10-26 | Resolved: 2021-04-30

## 2021-04-30 PROBLEM — R06.83 SNORING: Status: RESOLVED | Noted: 2018-03-09 | Resolved: 2021-04-30

## 2021-04-30 PROBLEM — Z00.00 ANNUAL PHYSICAL EXAM: Status: RESOLVED | Noted: 2017-04-26 | Resolved: 2021-04-30

## 2021-04-30 PROBLEM — M25.521 PAIN OF BOTH ELBOWS: Status: RESOLVED | Noted: 2017-08-28 | Resolved: 2021-04-30

## 2021-04-30 PROBLEM — R09.89 BRUIT OF LEFT CAROTID ARTERY: Status: RESOLVED | Noted: 2018-09-13 | Resolved: 2021-04-30

## 2021-04-30 PROBLEM — M79.18 MYOFASCIAL PAIN: Status: RESOLVED | Noted: 2020-10-26 | Resolved: 2021-04-30

## 2021-04-30 PROCEDURE — 99214 OFFICE O/P EST MOD 30 MIN: CPT | Performed by: FAMILY MEDICINE

## 2021-04-30 RX ORDER — ATENOLOL 100 MG/1
50 TABLET ORAL DAILY
Qty: 90 TABLET | Refills: 3 | Status: SHIPPED | OUTPATIENT
Start: 2021-04-30 | End: 2022-12-20 | Stop reason: SDUPTHER

## 2021-04-30 RX ORDER — VITAMIN B COMPLEX
CAPSULE ORAL DAILY
COMMUNITY

## 2021-04-30 NOTE — PROGRESS NOTES
Date of Encounter: 2021  Patient: Gerard Gonzalez,  1960    Subjective   History of Presenting Illness  Chief complaint: Establish care    No acute concerns.    Hypertension, with recent medication changes but better control per ambulatory blood pressure.  Typically running 130s/70s-80s.    Hyperlipidemia: Tolerating atorvastatin without side effect.    Borderline type 2 diabetes for several years, has never been on diabetic medications.  He has improved his diet significantly with reduction in his sugared beverages, sweets, portion control.  Recent setback around [place] when eating his mother's cooking.  Recent eye exam about 6 weeks ago.    JAKOB adherent to CPAP.    GERD, managed with as needed omeprazole.  Improving with dietary improvements.    Chronic thoracic and lumbar back pain, saw pain management for a branch block which was not successful.  Currently he does not take medications regularly and prefers physical therapy for this problem.  History of vertebral compression fracture.  His job is pretty bad on his back.    Chronic right ankle pain, was seen by podiatrist to suspected osteoarthritis of ankle joint.  X-rays were done at that time.  He does not currently wear an ankle brace.  He does have some associated swelling of the ankle and foot on that side.  Wears shoe inserts.    Obesity with recent weight loss due to dietary changes.    Recent abnormal serum creatinine that needs repeat today.    Single, not currently sexually active.  Never a smoker.  Very rare alcohol use.  Exercises twice per week by walking.  Improving diet with diabetic changes as listed above.  Colon cancer screening 2016 with 10-year interval.  Works as a bath , trying to transition to alternative job since he is aware of the toll on his body.  Does not have a living will.  Pending second Covid vaccine today.    Review of Systems:  Negative for fever, congestion, chest pain upon exertion, shortness of  breath, vision changes, vomiting, dysuria, lymphadenopathy, muscle weakness, numbness, mood changes, rashes.    The following portions of the patient's history were reviewed and updated as appropriate: allergies, current medications, past family history, past medical history, past social history, past surgical history and problem list.    Patient Active Problem List   Diagnosis   • Gastroesophageal reflux disease without esophagitis   • Mixed hyperlipidemia   • Essential hypertension   • Keratosis   • Borderline type 2 diabetes mellitus   • Morbid obesity (CMS/HCC)   • Peripheral edema   • Degeneration of lumbar intervertebral disc   • History of compression fracture of spine   • Lumbosacral spondylosis without myelopathy   • Chronic midline low back pain with right-sided sciatica   • Thoracic spondylosis without myelopathy   • JAKOB on CPAP   • Chronic pain of right ankle     Past Medical History:   Diagnosis Date   • Benign essential hypertension    • Bruit of left carotid artery 9/13/2018   • Diabetes mellitus (CMS/[facility], located [facility])     diet controlled   • GERD (gastroesophageal reflux disease)    • Headache    • Hip pain, left 1/29/2016   • Myofascial pain 10/26/2020   • Pain of both elbows 8/28/2017   • Sleep apnea     wears cpap   • Umbilical hernia 1/29/2016     Past Surgical History:   Procedure Laterality Date   • COLONOSCOPY  03/03/2016   • HEMORRHOIDECTOMY     • TUMOR REMOVAL      from groin, benign   • UMBILICAL HERNIA REPAIR  03/03/2016     Family History   Problem Relation Age of Onset   • Cancer Father         prostate   • Hypertension Other    • Hypertension Mother    • Cancer Paternal Uncle    • Cancer Paternal Grandfather        Current Outpatient Medications:   •  amLODIPine (NORVASC) 5 MG tablet, Take 2 tablets by mouth Daily., Disp: 90 tablet, Rfl: 3  •  APPLE CIDER VINEGAR PO, Take  by mouth., Disp: , Rfl:   •  aspirin 81 MG EC tablet, Take 81 mg by mouth Daily., Disp: , Rfl:   •  atenolol (TENORMIN) 100 MG  "tablet, Take 0.5 tablets by mouth Daily. Pt will start taking 1/2 po qd, Disp: 90 tablet, Rfl: 3  •  atorvastatin (LIPITOR) 20 MG tablet, Take 1 tablet by mouth Daily., Disp: 90 tablet, Rfl: 1  •  B Complex Vitamins (vitamin b complex) capsule capsule, Take  by mouth Daily., Disp: , Rfl:   •  Cinnamon 500 MG capsule, Take 1,000 mg by mouth Daily., Disp: , Rfl:   •  Coenzyme Q10 (CO Q 10 PO), Take by mouth., Disp: , Rfl:   •  Cyanocobalamin (B-12 PO), Take  by mouth., Disp: , Rfl:   •  hydroCHLOROthiazide (HYDRODIURIL) 25 MG tablet, Take 1 tablet by mouth Daily., Disp: 90 tablet, Rfl: 0  •  KRILL OIL PO, Take  by mouth., Disp: , Rfl:   •  losartan (COZAAR) 100 MG tablet, TAKE 1 TABLET DAILY, Disp: 90 tablet, Rfl: 3  •  melatonin 1 MG tablet, Take 3 mg by mouth Every Night., Disp: , Rfl:   •  omeprazole (PriLOSEC) 20 MG capsule, Take one or two daily as needed for reflux, Disp: 180 capsule, Rfl: 1  Allergies   Allergen Reactions   • Codeine Other (See Comments)     tremors   • Valsartan Angioedema   • Hydrocodone-Acetaminophen Other (See Comments)     Made me real hot     Social History     Tobacco Use   • Smoking status: Never Smoker   • Smokeless tobacco: Never Used   Substance Use Topics   • Alcohol use: Yes     Comment: occasional   • Drug use: No          Objective   Physical Exam  Vitals:    04/30/21 1352   BP: 138/74   Pulse: 88   Temp: 98.7 °F (37.1 °C)   TempSrc: Temporal   SpO2: 98%   Weight: (!) 143 kg (314 lb 6.4 oz)   Height: 193 cm (76\")     Body mass index is 38.27 kg/m².    Constitutional: NAD.  Eyes: EOMI. PERRLA. Normal conjunctiva.  Ear, nose, mouth, throat: No tonsillar exudates or erythema.   Normal nasal mucosa. Normal external ear canals and TMs bilaterally.  Cardiovascular: RRR. No murmurs. No LE edema b/l. Radial pulses 2+ bilaterally.  Pulmonary: CTA b/l. Good effort.  Integumentary: No rashes or wounds on face or upper extremities.  Lymphatic: No anterior cervical " lymphadenopathy.  Endocrine: No thyromegaly or palpable thyroid nodules.  Psychiatric: Normal affect. Normal thought content.  Musculoskeletal: Tenderness to palpation of the right ankle joint.  Mild pitting pedal edema extending part way up the calf.  No pedal edema on the left side.     Assessment/Plan   Assessment and Plan  Very pleasant 60-year-old male with hypertension, hyperlipidemia, borderline type 2 diabetes, JAKOB on CPAP, chronic thoracic and lumbar back pain, GERD, BMI greater than 35, who presents for the following:    Diagnoses and all orders for this visit:    1. Essential hypertension (Primary): Controlled per ambulatory measurements, continue to monitor  -     atenolol (TENORMIN) 100 MG tablet; Take 0.5 tablets by mouth Daily. Pt will start taking 1/2 po qd  Dispense: 90 tablet; Refill: 3    2. Mixed hyperlipidemia: Tolerating atorvastatin well    3. Borderline type 2 diabetes mellitus: Discussed the risks and benefits of low-dose Metformin which may help push him into normal A1c territory since he has been borderline for some time.  Will consider pending GFR results    4. JAKOB on CPAP: Adherent    5. Gastroesophageal reflux disease without esophagitis: Only uses PPI as needed    6. Chronic midline low back pain with right-sided sciatica: Manages with physical therapy.  Recommend high-dose acetaminophen if interested.  7. Thoracic spondylosis without myelopathy    8. Peripheral edema  9. Chronic pain of right ankle: Recommend ankle brace, shoe inserts, continued watchful waiting.    10. Morbid obesity (CMS/HCC): Discussed weight loss, he has making some changes which will likely help continue his trajectory    11. Abnormal serum creatinine level: Suspect dehydration or normal variation, will recheck though  -     Basic Metabolic Panel    Patient to follow-up in 3 months with type 2 diabetes, hypertension, BMI.  Will discuss PPSV23 at that time    Ramirez Hernandez MD  Family Medicine  O: [phone]  C:  [phone]    Disclaimer: Parts of this note were dictated by speech recognition. Minor errors in transcription may be present. Please call if questions.

## 2021-05-01 LAB
BUN SERPL-MCNC: 14 MG/DL (ref 6–24)
BUN/CREAT SERPL: 14 (ref 9–20)
CALCIUM SERPL-MCNC: 9.4 MG/DL (ref 8.7–10.2)
CHLORIDE SERPL-SCNC: 102 MMOL/L (ref 96–106)
CO2 SERPL-SCNC: 24 MMOL/L (ref 20–29)
CREAT SERPL-MCNC: 0.98 MG/DL (ref 0.76–1.27)
GLUCOSE SERPL-MCNC: 114 MG/DL (ref 65–99)
POTASSIUM SERPL-SCNC: 3.2 MMOL/L (ref 3.5–5.2)
SODIUM SERPL-SCNC: 142 MMOL/L (ref 134–144)

## 2021-05-02 DIAGNOSIS — R73.03 BORDERLINE TYPE 2 DIABETES MELLITUS: Primary | ICD-10-CM

## 2021-05-24 RX ORDER — ATORVASTATIN CALCIUM 20 MG/1
TABLET, FILM COATED ORAL
Qty: 90 TABLET | Refills: 3 | OUTPATIENT
Start: 2021-05-24

## 2021-06-02 DIAGNOSIS — R73.03 BORDERLINE TYPE 2 DIABETES MELLITUS: ICD-10-CM

## 2021-06-02 DIAGNOSIS — E78.2 MIXED HYPERLIPIDEMIA: Primary | ICD-10-CM

## 2021-06-02 RX ORDER — ATORVASTATIN CALCIUM 20 MG/1
20 TABLET, FILM COATED ORAL DAILY
Qty: 90 TABLET | Refills: 1 | Status: SHIPPED | OUTPATIENT
Start: 2021-06-02 | End: 2021-06-03 | Stop reason: SDUPTHER

## 2021-06-03 DIAGNOSIS — E78.2 MIXED HYPERLIPIDEMIA: ICD-10-CM

## 2021-06-03 RX ORDER — ATORVASTATIN CALCIUM 20 MG/1
20 TABLET, FILM COATED ORAL DAILY
Qty: 30 TABLET | Refills: 0 | Status: SHIPPED | OUTPATIENT
Start: 2021-06-03 | End: 2021-07-30 | Stop reason: SDUPTHER

## 2021-06-03 NOTE — TELEPHONE ENCOUNTER
Refill was sent to [facility] 6/3/2021, but pt is out of atorvastatin as of [facility].    [facility] does not ship until 6/9/2021, and pt will need Rx before then.    Please send small qty to local [facility] pharmacy in chart.

## 2021-07-12 DIAGNOSIS — I10 ESSENTIAL HYPERTENSION: Primary | ICD-10-CM

## 2021-07-13 DIAGNOSIS — K21.9 GASTROESOPHAGEAL REFLUX DISEASE WITHOUT ESOPHAGITIS: ICD-10-CM

## 2021-07-13 RX ORDER — LOSARTAN POTASSIUM 100 MG/1
100 TABLET ORAL DAILY
Qty: 90 TABLET | Refills: 3 | Status: SHIPPED | OUTPATIENT
Start: 2021-07-13 | End: 2022-03-08 | Stop reason: SDUPTHER

## 2021-07-13 RX ORDER — HYDROCHLOROTHIAZIDE 25 MG/1
TABLET ORAL
Qty: 90 TABLET | Refills: 3 | Status: SHIPPED | OUTPATIENT
Start: 2021-07-13 | End: 2022-03-08 | Stop reason: SDUPTHER

## 2021-07-13 NOTE — TELEPHONE ENCOUNTER
Caller: FAX    Relationship: Pharmacy    Medication needed:   Requested Prescriptions     Pending Prescriptions Disp Refills   • losartan (COZAAR) 100 MG tablet 90 tablet 3     Sig: Take 1 tablet by mouth Daily.         What is the patient's preferred pharmacy: [facility] - [facility] [address] [place] [address] [phone] [facility] [phone] FX<br>ANN [address] [address] [facility] AT [facility] & FACTORY [facility] [phone] [facility] [phone] [facility]

## 2021-07-29 ENCOUNTER — OFFICE VISIT (OUTPATIENT)
Dept: INTERNAL MEDICINE | Facility: CLINIC | Age: 61
End: 2021-07-29

## 2021-07-29 VITALS
OXYGEN SATURATION: 99 % | WEIGHT: 301.6 LBS | HEART RATE: 45 BPM | HEIGHT: 76 IN | BODY MASS INDEX: 36.73 KG/M2 | TEMPERATURE: 97.3 F | SYSTOLIC BLOOD PRESSURE: 138 MMHG | DIASTOLIC BLOOD PRESSURE: 80 MMHG

## 2021-07-29 DIAGNOSIS — R73.03 BORDERLINE TYPE 2 DIABETES MELLITUS: ICD-10-CM

## 2021-07-29 DIAGNOSIS — G89.29 CHRONIC PAIN OF RIGHT ANKLE: ICD-10-CM

## 2021-07-29 DIAGNOSIS — M25.571 CHRONIC PAIN OF RIGHT ANKLE: ICD-10-CM

## 2021-07-29 DIAGNOSIS — E78.2 MIXED HYPERLIPIDEMIA: ICD-10-CM

## 2021-07-29 DIAGNOSIS — I10 ESSENTIAL HYPERTENSION: Primary | ICD-10-CM

## 2021-07-29 DIAGNOSIS — K21.9 GASTROESOPHAGEAL REFLUX DISEASE WITHOUT ESOPHAGITIS: ICD-10-CM

## 2021-07-29 DIAGNOSIS — R59.1 LYMPHADENOPATHY OF HEAD AND NECK: ICD-10-CM

## 2021-07-29 DIAGNOSIS — R60.9 PERIPHERAL EDEMA: ICD-10-CM

## 2021-07-29 PROCEDURE — 99214 OFFICE O/P EST MOD 30 MIN: CPT | Performed by: FAMILY MEDICINE

## 2021-07-29 NOTE — PROGRESS NOTES
Date of Encounter: 2021  Patient: Gerard Gonzalez,  1960    Subjective   History of Presenting Illness  Chief complaint: Follow-up diabetes    Hypertension, blood pressure doing well on current regimen.  No side effects.    Borderline type 2 diabetes on Metformin.  Patient has made significant dietary changes, eating more salads and less carbohydrates.  He has lost about 13 pounds since his last appointment.    GERD currently controlled on omeprazole.    2-week history of discomfort in a lymph node in the left side of his neck, he does have nasal congestion and is not currently taking his allergy medicine.    Chronic pain of right ankle plans to follow-up with orthopedist.  Occasional swelling in the right lower extremity that improves with ambulation, leg elevation.    Review of Systems:  Negative for fever, cough, shortness of breath    The following portions of the patient's history were reviewed and updated as appropriate: allergies, current medications, past family history, past medical history, past social history, past surgical history and problem list.    Patient Active Problem List   Diagnosis   • Gastroesophageal reflux disease without esophagitis   • Mixed hyperlipidemia   • Essential hypertension   • Keratosis   • Borderline type 2 diabetes mellitus   • Morbid obesity (CMS/HCC)   • Peripheral edema   • Degeneration of lumbar intervertebral disc   • History of compression fracture of spine   • Lumbosacral spondylosis without myelopathy   • Chronic midline low back pain with right-sided sciatica   • Thoracic spondylosis without myelopathy   • JAKOB on CPAP   • Chronic pain of right ankle   • Lymphadenopathy of head and neck     Past Medical History:   Diagnosis Date   • Benign essential hypertension    • Bruit of left carotid artery 2018   • Diabetes mellitus (CMS/HCC)     diet controlled   • GERD (gastroesophageal reflux disease)    • Headache    • Hip pain, left 2016   • Myofascial  pain 10/26/2020   • Pain of both elbows 8/28/2017   • Sleep apnea     wears cpap   • Umbilical hernia 1/29/2016     Past Surgical History:   Procedure Laterality Date   • COLONOSCOPY  03/03/2016   • HEMORRHOIDECTOMY     • TUMOR REMOVAL      from groin, benign   • UMBILICAL HERNIA REPAIR  03/03/2016     Family History   Problem Relation Age of Onset   • Cancer Father         prostate   • Hypertension Other    • Hypertension Mother    • Cancer Paternal Uncle    • Cancer Paternal Grandfather        Current Outpatient Medications:   •  amLODIPine (NORVASC) 5 MG tablet, Take 2 tablets by mouth Daily., Disp: 90 tablet, Rfl: 3  •  APPLE CIDER VINEGAR PO, Take  by mouth., Disp: , Rfl:   •  aspirin 81 MG EC tablet, Take 81 mg by mouth Daily., Disp: , Rfl:   •  atenolol (TENORMIN) 100 MG tablet, Take 0.5 tablets by mouth Daily. Pt will start taking 1/2 po qd, Disp: 90 tablet, Rfl: 3  •  atorvastatin (LIPITOR) 20 MG tablet, Take 1 tablet by mouth Daily., Disp: 30 tablet, Rfl: 0  •  B Complex Vitamins (vitamin b complex) capsule capsule, Take  by mouth Daily., Disp: , Rfl:   •  Cinnamon 500 MG capsule, Take 1,000 mg by mouth Daily., Disp: , Rfl:   •  Coenzyme Q10 (CO Q 10 PO), Take by mouth., Disp: , Rfl:   •  Cyanocobalamin (B-12 PO), Take  by mouth., Disp: , Rfl:   •  hydroCHLOROthiazide (HYDRODIURIL) 25 MG tablet, TAKE 1 TABLET DAILY, Disp: 90 tablet, Rfl: 3  •  KRILL OIL PO, Take  by mouth., Disp: , Rfl:   •  losartan (COZAAR) 100 MG tablet, Take 1 tablet by mouth Daily., Disp: 90 tablet, Rfl: 3  •  melatonin 1 MG tablet, Take 3 mg by mouth Every Night., Disp: , Rfl:   •  metFORMIN (Glucophage) 500 MG tablet, Take 1 tablet by mouth Daily With Breakfast., Disp: 90 tablet, Rfl: 1  •  omeprazole (PriLOSEC) 20 MG capsule, Take one or two daily as needed for reflux, Disp: 180 capsule, Rfl: 1  Allergies   Allergen Reactions   • Codeine Other (See Comments)     tremors   • Valsartan Angioedema   • Hydrocodone-Acetaminophen Other  "(See Comments)     Made me real hot     Social History     Tobacco Use   • Smoking status: Never Smoker   • Smokeless tobacco: Never Used   Substance Use Topics   • Alcohol use: Yes     Comment: occasional   • Drug use: No          Objective   Physical Exam  Vitals:    07/29/21 0820   BP: 138/80   Pulse: (!) 45   Temp: 97.3 °F (36.3 °C)   TempSrc: Temporal   SpO2: 99%   Weight: (!) 137 kg (301 lb 9.6 oz)   Height: 193 cm (76\")     Body mass index is 36.71 kg/m².    Constitutional: NAD.  Eyes: EOMI. PERRLA. Normal conjunctiva.  Ear, nose, mouth, throat: Normal external ear canals and TMs bilaterally.  Cardiovascular: RRR. No murmurs. No LE edema b/l. Radial pulses 2+ bilaterally.  Pulmonary: CTA b/l. Good effort.  Integumentary: No rashes or wounds on face or upper extremities.  Lymphatic: Tender jugulodigastric cervical lymphadenopathy on the left side, not enlarged  Endocrine: No thyromegaly or palpable thyroid nodules.  Psychiatric: Normal affect. Normal thought content.     Assessment/Plan   Assessment and Plan  Very pleasant 61-year-old male with hypertension, hyperlipidemia, borderline type 2 diabetes, JAKOB on CPAP, chronic thoracic and lumbar back pain, GERD, BMI greater than 35, who presents for the following:    Diagnoses and all orders for this visit:    1. Essential hypertension (Primary): Controlled    2. Borderline type 2 diabetes mellitus: Suspect improvement with Metformin and weight loss  -     Hemoglobin A1c    3. Mixed hyperlipidemia: Tolerating atorvastatin with no side effects  -     Comprehensive Metabolic Panel  -     Lipid Panel    4. Gastroesophageal reflux disease without esophagitis: Controlled on omeprazole    5. Chronic pain of right ankle: Patient to follow-up with orthopedist    6. Peripheral edema: Discussed compression stockings, regular leg elevation    7. Lymphadenopathy of head and neck: Suspect reactive in context of nasal congestion, discussed reasons to return for further " evaluation.    Follow-up in 6 months for annual physical    Ramirez Hernandez MD  Family Medicine  O: [phone]  C: [phone]    Disclaimer: Parts of this note were dictated by speech recognition. Minor errors in transcription may be present. Please call if questions.

## 2021-07-29 NOTE — PROGRESS NOTES
Venipuncture performed in [facility] by KD with good hemostasis. Patient tolerated well. 7/29/2021 Johanna RAMOS LPN.

## 2021-07-30 DIAGNOSIS — E78.2 MIXED HYPERLIPIDEMIA: ICD-10-CM

## 2021-07-30 LAB
ALBUMIN SERPL-MCNC: 5 G/DL (ref 3.8–4.9)
ALBUMIN/GLOB SERPL: 2.9 {RATIO} (ref 1.2–2.2)
ALP SERPL-CCNC: 88 IU/L (ref 48–121)
ALT SERPL-CCNC: 16 IU/L (ref 0–44)
AST SERPL-CCNC: 19 IU/L (ref 0–40)
BILIRUB SERPL-MCNC: 0.2 MG/DL (ref 0–1.2)
BUN SERPL-MCNC: 19 MG/DL (ref 6–24)
BUN/CREAT SERPL: 14 (ref 9–20)
CALCIUM SERPL-MCNC: 10.1 MG/DL (ref 8.7–10.2)
CHLORIDE SERPL-SCNC: 105 MMOL/L (ref 96–106)
CHOLEST SERPL-MCNC: 212 MG/DL (ref 100–199)
CO2 SERPL-SCNC: 21 MMOL/L (ref 20–29)
CREAT SERPL-MCNC: 1.34 MG/DL (ref 0.76–1.27)
GLOBULIN SER CALC-MCNC: 1.7 G/DL (ref 1.5–4.5)
GLUCOSE SERPL-MCNC: 96 MG/DL (ref 65–99)
HBA1C MFR BLD: 5.4 % (ref 4.8–5.6)
HDLC SERPL-MCNC: 37 MG/DL
LDLC SERPL CALC-MCNC: 107 MG/DL (ref 0–99)
POTASSIUM SERPL-SCNC: 4.7 MMOL/L (ref 3.5–5.2)
PROT SERPL-MCNC: 6.7 G/DL (ref 6–8.5)
SODIUM SERPL-SCNC: 145 MMOL/L (ref 134–144)
TRIGL SERPL-MCNC: 398 MG/DL (ref 0–149)
VLDLC SERPL CALC-MCNC: 68 MG/DL (ref 5–40)

## 2021-07-30 RX ORDER — ATORVASTATIN CALCIUM 40 MG/1
40 TABLET, FILM COATED ORAL DAILY
Qty: 90 TABLET | Refills: 3 | Status: SHIPPED | OUTPATIENT
Start: 2021-07-30 | End: 2021-12-28 | Stop reason: SDUPTHER

## 2021-09-15 ENCOUNTER — TELEPHONE (OUTPATIENT)
Dept: INTERNAL MEDICINE | Facility: CLINIC | Age: 61
End: 2021-09-15

## 2021-09-15 DIAGNOSIS — M54.41 CHRONIC MIDLINE LOW BACK PAIN WITH RIGHT-SIDED SCIATICA: Primary | ICD-10-CM

## 2021-09-15 DIAGNOSIS — G89.29 CHRONIC MIDLINE LOW BACK PAIN WITH RIGHT-SIDED SCIATICA: Primary | ICD-10-CM

## 2021-09-15 NOTE — TELEPHONE ENCOUNTER
PATIENT CALLING IN REGARDS TO REQUEST A REFERRAL FOR PHYSICAL THERAPY FOR HIS BACK. PLEASE ADVISE THANK YOU!    HERNÁN CROCKER  FAX NUMBER [phone]

## 2021-09-15 NOTE — TELEPHONE ENCOUNTER
Caller: Gerard Gonzalez    Relationship: Self    Best call back number:  [phone]      What orders are you requesting (i.e. lab or imaging): ORDERS FOR PHYSICAL THERAPY FOR BACK     In what timeframe would the patient need to come in: ASAP     Where will you receive your lab/imaging services: [facility]  HERNÁN CROCKER,  FAX NUMBER [phone]    Additional notes:  PATIENT CAN GET SEEN IN PHYSICAL THERAPY TOMORROW AT 5:30 PM AND IS WILLING TO BE SEEN IF HE NEEDS TO .  PATIENT IS GOING OUT OF TOWN ON Friday .  PLEASE CALL PATIENT AND ADVISE .

## 2021-10-14 ENCOUNTER — TELEPHONE (OUTPATIENT)
Dept: INTERNAL MEDICINE | Facility: CLINIC | Age: 61
End: 2021-10-14

## 2021-10-14 NOTE — TELEPHONE ENCOUNTER
Caller: Gerard Gonzalez    Relationship: Self    Best call back number: [phone]    What medication are you requesting: ANTIBIOTIC    What are your current symptoms: HEAD COLD, DRAINAGE, CONGESTION    How long have you been experiencing symptoms: SINCE SUNDAY    Have you had these symptoms before:    [] Yes  [] No    Have you been treated for these symptoms before:   [] Yes  [] No    If a prescription is needed, what is your preferred pharmacy and phone number: [facility] [address] [facility] [address] [facility] AT [facility] [phone] [facility] [phone]      Additional notes:

## 2021-11-28 DIAGNOSIS — R73.03 BORDERLINE TYPE 2 DIABETES MELLITUS: ICD-10-CM

## 2021-11-29 RX ORDER — AMLODIPINE BESYLATE 5 MG/1
TABLET ORAL
Qty: 90 TABLET | Refills: 7 | Status: SHIPPED | OUTPATIENT
Start: 2021-11-29 | End: 2022-02-25 | Stop reason: SDUPTHER

## 2021-12-27 ENCOUNTER — TELEPHONE (OUTPATIENT)
Dept: INTERNAL MEDICINE | Facility: CLINIC | Age: 61
End: 2021-12-27

## 2021-12-27 DIAGNOSIS — Z78.9 UNKNOWN STATUS OF IMMUNITY TO COVID-19 VIRUS: Primary | ICD-10-CM

## 2021-12-27 NOTE — TELEPHONE ENCOUNTER
Caller: Gerard Gonzalez    Relationship: Self    Best call back number: [phone]    What orders are you requesting (i.e. lab or imaging): COVID-19 ANTIBODY TESTING (THE PATIENT STATES THAT HE WOULD LIKE TO DO THE TEST THAT SHOWS THE NUMBERS).    In what timeframe would the patient need to come in: 12/30/2021 AT 8:30 A.A. (PATIENT IS ALREADY SCHEDULED FOR A FLU SHOT AT THIS DAY AND TIME).    Where will you receive your lab/imaging services: IN-OFFICE

## 2021-12-28 DIAGNOSIS — E78.2 MIXED HYPERLIPIDEMIA: ICD-10-CM

## 2021-12-28 RX ORDER — ATORVASTATIN CALCIUM 40 MG/1
40 TABLET, FILM COATED ORAL DAILY
Qty: 90 TABLET | Refills: 3 | Status: SHIPPED | OUTPATIENT
Start: 2021-12-28 | End: 2022-03-08 | Stop reason: SDUPTHER

## 2021-12-28 NOTE — TELEPHONE ENCOUNTER
I placed the order  You all know my opinion on the utility of the covid antibody test, but if he wants to pay I have placed the order

## 2021-12-28 NOTE — TELEPHONE ENCOUNTER
Pt called back to schedule lab appt. Pt was advised that we are not aware of cost of test, as it is dependent on insurance. Pt verbalized understanding, agreement. He has been scheduled 12/30/2021.

## 2021-12-29 ENCOUNTER — TELEPHONE (OUTPATIENT)
Dept: INTERNAL MEDICINE | Facility: CLINIC | Age: 61
End: 2021-12-29

## 2021-12-29 DIAGNOSIS — R73.03 BORDERLINE TYPE 2 DIABETES MELLITUS: ICD-10-CM

## 2021-12-29 DIAGNOSIS — I10 ESSENTIAL HYPERTENSION: ICD-10-CM

## 2021-12-29 DIAGNOSIS — E78.2 MIXED HYPERLIPIDEMIA: Primary | ICD-10-CM

## 2021-12-29 DIAGNOSIS — Z12.5 SCREENING FOR MALIGNANT NEOPLASM OF PROSTATE: ICD-10-CM

## 2021-12-29 NOTE — TELEPHONE ENCOUNTER
PT HAS LAB APPT SCHEDULED FOR tHURSDAY 12/30/21 AT 8:45AM. There are no active labs in pt chart.

## 2021-12-30 ENCOUNTER — LAB (OUTPATIENT)
Dept: INTERNAL MEDICINE | Facility: CLINIC | Age: 61
End: 2021-12-30

## 2021-12-30 DIAGNOSIS — Z23 NEED FOR INFLUENZA VACCINATION: Primary | ICD-10-CM

## 2021-12-30 DIAGNOSIS — R73.03 BORDERLINE TYPE 2 DIABETES MELLITUS: ICD-10-CM

## 2021-12-30 DIAGNOSIS — Z12.5 SCREENING FOR MALIGNANT NEOPLASM OF PROSTATE: ICD-10-CM

## 2021-12-30 DIAGNOSIS — E78.2 MIXED HYPERLIPIDEMIA: ICD-10-CM

## 2021-12-30 PROCEDURE — 90471 IMMUNIZATION ADMIN: CPT | Performed by: FAMILY MEDICINE

## 2021-12-30 PROCEDURE — 90686 IIV4 VACC NO PRSV 0.5 ML IM: CPT | Performed by: FAMILY MEDICINE

## 2021-12-31 LAB
ALBUMIN SERPL-MCNC: 4.4 G/DL (ref 3.8–4.9)
ALBUMIN/GLOB SERPL: 1.8 {RATIO} (ref 1.2–2.2)
ALP SERPL-CCNC: 61 IU/L (ref 44–121)
ALT SERPL-CCNC: 43 IU/L (ref 0–44)
APPEARANCE UR: CLEAR
AST SERPL-CCNC: 23 IU/L (ref 0–40)
BACTERIA #/AREA URNS HPF: NORMAL /[HPF]
BILIRUB SERPL-MCNC: 0.6 MG/DL (ref 0–1.2)
BILIRUB UR QL STRIP: NEGATIVE
BUN SERPL-MCNC: 13 MG/DL (ref 6–24)
BUN/CREAT SERPL: 13 (ref 9–20)
CALCIUM SERPL-MCNC: 9.1 MG/DL (ref 8.7–10.2)
CASTS URNS QL MICRO: NORMAL /LPF
CHLORIDE SERPL-SCNC: 102 MMOL/L (ref 96–106)
CHOLEST SERPL-MCNC: 134 MG/DL (ref 100–199)
CO2 SERPL-SCNC: 25 MMOL/L (ref 20–29)
COLOR UR: YELLOW
CREAT SERPL-MCNC: 1.03 MG/DL (ref 0.76–1.27)
EPI CELLS #/AREA URNS HPF: NORMAL /HPF (ref 0–10)
FT4I SERPL CALC-MCNC: 1.5 (ref 1.2–4.9)
GLOBULIN SER CALC-MCNC: 2.4 G/DL (ref 1.5–4.5)
GLUCOSE SERPL-MCNC: 186 MG/DL (ref 65–99)
GLUCOSE UR QL: NEGATIVE
HBA1C MFR BLD: 6.4 % (ref 4.8–5.6)
HDLC SERPL-MCNC: 38 MG/DL
HGB UR QL STRIP: NEGATIVE
KETONES UR QL STRIP: NEGATIVE
LDLC SERPL CALC-MCNC: 72 MG/DL (ref 0–99)
LEUKOCYTE ESTERASE UR QL STRIP: NEGATIVE
MICRO URNS: NORMAL
MICRO URNS: NORMAL
NITRITE UR QL STRIP: NEGATIVE
PH UR STRIP: 5.5 [PH] (ref 5–7.5)
POTASSIUM SERPL-SCNC: 4.5 MMOL/L (ref 3.5–5.2)
PROT SERPL-MCNC: 6.8 G/DL (ref 6–8.5)
PROT UR QL STRIP: NEGATIVE
PSA SERPL-MCNC: 1.8 NG/ML (ref 0–4)
RBC #/AREA URNS HPF: NORMAL /HPF (ref 0–2)
SARS-COV-2 AB SERPL QL IA: POSITIVE
SODIUM SERPL-SCNC: 142 MMOL/L (ref 134–144)
SP GR UR: 1.01 (ref 1–1.03)
T3RU NFR SERPL: 29 % (ref 24–39)
T4 SERPL-MCNC: 5.2 UG/DL (ref 4.5–12)
TRIGL SERPL-MCNC: 133 MG/DL (ref 0–149)
TSH SERPL DL<=0.005 MIU/L-ACNC: 0.79 UIU/ML (ref 0.45–4.5)
UROBILINOGEN UR STRIP-MCNC: 0.2 MG/DL (ref 0.2–1)
VLDLC SERPL CALC-MCNC: 24 MG/DL (ref 5–40)
WBC #/AREA URNS HPF: NORMAL /HPF (ref 0–5)

## 2022-01-03 DIAGNOSIS — Z78.9 UNKNOWN STATUS OF IMMUNITY TO COVID-19 VIRUS: Primary | ICD-10-CM

## 2022-01-03 DIAGNOSIS — Z78.9 UNKNOWN STATUS OF IMMUNITY TO COVID-19 VIRUS: ICD-10-CM

## 2022-01-06 LAB
SARS-COV-2 AB SERPL IA-ACNC: >2500 U/ML
SARS-COV-2 AB SERPL-IMP: POSITIVE
WRITTEN AUTHORIZATION: NORMAL

## 2022-02-25 RX ORDER — AMLODIPINE BESYLATE 5 MG/1
10 TABLET ORAL DAILY
Qty: 180 TABLET | Refills: 2 | Status: SHIPPED | OUTPATIENT
Start: 2022-02-25 | End: 2022-03-04 | Stop reason: SDUPTHER

## 2022-03-04 RX ORDER — AMLODIPINE BESYLATE 10 MG/1
10 TABLET ORAL DAILY
Qty: 90 TABLET | Refills: 3 | Status: SHIPPED | OUTPATIENT
Start: 2022-03-04 | End: 2022-05-12 | Stop reason: SDUPTHER

## 2022-03-04 NOTE — TELEPHONE ENCOUNTER
PATIENT REQUESTED A REFILL ON:amLODIPine (NORVASC) 5 MG tablet    PATIENT CAN BE REACHED ON:[phone]     PHARMACY [address] [address] [facility] AT [place] [employer] & FACTORY [place] - [phone] [facility] [phone]   [phone]

## 2022-03-08 DIAGNOSIS — K21.9 GASTROESOPHAGEAL REFLUX DISEASE WITHOUT ESOPHAGITIS: ICD-10-CM

## 2022-03-08 DIAGNOSIS — T75.3XXD MOTION SICKNESS, SUBSEQUENT ENCOUNTER: ICD-10-CM

## 2022-03-08 DIAGNOSIS — R73.03 BORDERLINE TYPE 2 DIABETES MELLITUS: ICD-10-CM

## 2022-03-08 DIAGNOSIS — E78.2 MIXED HYPERLIPIDEMIA: ICD-10-CM

## 2022-03-08 DIAGNOSIS — I10 ESSENTIAL HYPERTENSION: ICD-10-CM

## 2022-03-08 DIAGNOSIS — M51.36 DEGENERATION OF LUMBAR INTERVERTEBRAL DISC: ICD-10-CM

## 2022-03-08 RX ORDER — LOSARTAN POTASSIUM 100 MG/1
100 TABLET ORAL DAILY
Qty: 90 TABLET | Refills: 3 | Status: SHIPPED | OUTPATIENT
Start: 2022-03-08 | End: 2023-02-20 | Stop reason: SDUPTHER

## 2022-03-08 RX ORDER — HYDROCHLOROTHIAZIDE 25 MG/1
25 TABLET ORAL DAILY
Qty: 90 TABLET | Refills: 3 | Status: SHIPPED | OUTPATIENT
Start: 2022-03-08 | End: 2023-02-20 | Stop reason: SDUPTHER

## 2022-03-08 RX ORDER — ATORVASTATIN CALCIUM 40 MG/1
40 TABLET, FILM COATED ORAL DAILY
Qty: 90 TABLET | Refills: 3 | Status: SHIPPED | OUTPATIENT
Start: 2022-03-08 | End: 2023-02-20 | Stop reason: SDUPTHER

## 2022-03-08 RX ORDER — SCOLOPAMINE TRANSDERMAL SYSTEM 1 MG/1
1 PATCH, EXTENDED RELEASE TRANSDERMAL
Qty: 4 PATCH | Refills: 0 | Status: SHIPPED | OUTPATIENT
Start: 2022-03-08 | End: 2023-03-23 | Stop reason: SDUPTHER

## 2022-03-08 NOTE — TELEPHONE ENCOUNTER
Caller: Gerard Gonzalez    Relationship: Self    Best call back number: [phone]    Requested Prescriptions:   Requested Prescriptions     Pending Prescriptions Disp Refills   • atorvastatin (LIPITOR) 40 MG tablet 90 tablet 3     Sig: Take 1 tablet by mouth Daily.   • losartan (COZAAR) 100 MG tablet 90 tablet 3     Sig: Take 1 tablet by mouth Daily.   • metFORMIN (GLUCOPHAGE) 500 MG tablet 90 tablet 3     Sig: Take 1 tablet by mouth Daily With Breakfast.   • hydroCHLOROthiazide (HYDRODIURIL) 25 MG tablet 90 tablet 3     Sig: Take 1 tablet by mouth Daily.    PATIENT NEEDS 4 OF THE MOTION SICKNESS PATCHES TO GO TO THE [address] AT [place] [employer] [facility] [phone] [facility] [phone]         [facility] [address] [phone] [facility] [phone] FX     Additional details provided by patient: PATIENT HAS A WEEK LEFT     Does the patient have less than a 3 day supply:  [] Yes  [x] No    Susi Martínez, Debbie Rep   03/08/22 07:52 EST

## 2022-05-12 ENCOUNTER — OFFICE VISIT (OUTPATIENT)
Dept: INTERNAL MEDICINE | Facility: CLINIC | Age: 62
End: 2022-05-12

## 2022-05-12 VITALS
WEIGHT: 308.8 LBS | OXYGEN SATURATION: 97 % | DIASTOLIC BLOOD PRESSURE: 64 MMHG | SYSTOLIC BLOOD PRESSURE: 140 MMHG | HEART RATE: 57 BPM | BODY MASS INDEX: 37.59 KG/M2 | TEMPERATURE: 97.1 F

## 2022-05-12 DIAGNOSIS — R35.1 BENIGN PROSTATIC HYPERPLASIA WITH NOCTURIA: ICD-10-CM

## 2022-05-12 DIAGNOSIS — K21.9 GASTROESOPHAGEAL REFLUX DISEASE WITHOUT ESOPHAGITIS: ICD-10-CM

## 2022-05-12 DIAGNOSIS — R60.9 PERIPHERAL EDEMA: ICD-10-CM

## 2022-05-12 DIAGNOSIS — R73.03 BORDERLINE TYPE 2 DIABETES MELLITUS: ICD-10-CM

## 2022-05-12 DIAGNOSIS — I10 ESSENTIAL HYPERTENSION: ICD-10-CM

## 2022-05-12 DIAGNOSIS — N40.1 BENIGN PROSTATIC HYPERPLASIA WITH NOCTURIA: ICD-10-CM

## 2022-05-12 DIAGNOSIS — E66.01 MORBID OBESITY: ICD-10-CM

## 2022-05-12 DIAGNOSIS — Z99.89 OSA ON CPAP: ICD-10-CM

## 2022-05-12 DIAGNOSIS — E78.2 MIXED HYPERLIPIDEMIA: ICD-10-CM

## 2022-05-12 DIAGNOSIS — K57.90 DIVERTICULOSIS: ICD-10-CM

## 2022-05-12 DIAGNOSIS — Z00.00 ANNUAL PHYSICAL EXAM: Primary | ICD-10-CM

## 2022-05-12 DIAGNOSIS — M13.0 POLYARTHRITIS: ICD-10-CM

## 2022-05-12 DIAGNOSIS — G47.33 OSA ON CPAP: ICD-10-CM

## 2022-05-12 DIAGNOSIS — N41.1 CHRONIC PROSTATITIS: ICD-10-CM

## 2022-05-12 PROBLEM — R59.1 LYMPHADENOPATHY OF HEAD AND NECK: Status: RESOLVED | Noted: 2021-07-29 | Resolved: 2022-05-12

## 2022-05-12 PROBLEM — M25.512 LEFT SHOULDER PAIN: Status: ACTIVE | Noted: 2017-08-28

## 2022-05-12 PROCEDURE — 99213 OFFICE O/P EST LOW 20 MIN: CPT | Performed by: FAMILY MEDICINE

## 2022-05-12 PROCEDURE — 99396 PREV VISIT EST AGE 40-64: CPT | Performed by: FAMILY MEDICINE

## 2022-05-12 RX ORDER — TAMSULOSIN HYDROCHLORIDE 0.4 MG/1
1 CAPSULE ORAL DAILY
Qty: 30 CAPSULE | Refills: 1 | Status: SHIPPED | OUTPATIENT
Start: 2022-05-12 | End: 2022-07-21

## 2022-05-12 RX ORDER — CYCLOBENZAPRINE HYDROCHLORIDE 7.5 MG/1
7.5 TABLET, FILM COATED ORAL NIGHTLY PRN
Qty: 90 TABLET | Refills: 3 | Status: SHIPPED | OUTPATIENT
Start: 2022-05-12 | End: 2022-05-12

## 2022-05-12 RX ORDER — TAMSULOSIN HYDROCHLORIDE 0.4 MG/1
1 CAPSULE ORAL DAILY
Qty: 30 CAPSULE | Refills: 1 | Status: SHIPPED | OUTPATIENT
Start: 2022-05-12 | End: 2022-05-12

## 2022-05-12 RX ORDER — AMLODIPINE BESYLATE 10 MG/1
10 TABLET ORAL DAILY
Qty: 90 TABLET | Refills: 3 | Status: SHIPPED | OUTPATIENT
Start: 2022-05-12

## 2022-05-12 RX ORDER — CYCLOBENZAPRINE HYDROCHLORIDE 7.5 MG/1
7.5 TABLET, FILM COATED ORAL NIGHTLY PRN
Qty: 90 TABLET | Refills: 3 | Status: SHIPPED | OUTPATIENT
Start: 2022-05-12

## 2022-05-12 RX ORDER — SULFAMETHOXAZOLE AND TRIMETHOPRIM 800; 160 MG/1; MG/1
TABLET ORAL
Qty: 48 TABLET | Refills: 0 | Status: SHIPPED | OUTPATIENT
Start: 2022-05-12 | End: 2022-12-27

## 2022-05-12 NOTE — PROGRESS NOTES
Date of Encounter: 2022  Patient: Gerard Gonzalez,  1960    Subjective   History of Presenting Illness  Chief complaint: Annual physical    Hypertension: Home measurements 140s/60s-70s.  After allowing him to rest for 10 minutes I rechecked his manual blood pressure which is more concordant with his home measurements.  His blood pressure was lower than this recently when he was actively losing weight but due to recent family issues he had a brief relapse in his dietary habits.    Hyperlipidemia, tolerating atorvastatin well.      Borderline type 2 diabetes: Tolerating metformin once daily with no side effects.  Not checking glucose regularly.    BPH with nocturia being followed by urology.  He was recently placed on Bactrim for 1 week for prostatitis, he did have improvement in the symptoms but his symptoms have recurred since completing the prescription.    Polyarthritis including lower back, left ankle, knees.  He does follow with orthopedist for his left ankle.  He does have a history of branch block for his lumbar disease that was not successful.  He is requesting a prescription for cyclobenzaprine, he found 10 mg too sedating when taken at nighttime, 5 mg not quite as effective.    GERD, has weaned off of omeprazole and only takes as needed, with no recent flares for some time.     JAKOB adherent to CPAP.     Peripheral edema with no recent exacerbations.    BMI greater than 35, he has lost weight with healthier eating and plans to continue this.    Diverticulosis with no history of diverticulitis despite eating plenty of classically triggering foods.     Single, not currently sexually active.  Never a smoker.  Very rare alcohol use.  Exercises twice per week by walking.  Adhering to diabetic diet.  Colon cancer screening 2016 with 10-year interval. 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  Does not have a living will.  Discussed COVID-19  booster which he declines today.    Review of Systems:  Negative for fever, congestion, chest pain upon exertion, shortness of breath, vision changes, vomiting, dysuria, lymphadenopathy, muscle weakness, numbness, mood changes, rashes.    The following portions of the patient's history were reviewed and updated as appropriate: allergies, current medications, past family history, past medical history, past social history, past surgical history and problem list.    Patient Active Problem List   Diagnosis   • Gastroesophageal reflux disease without esophagitis   • Mixed hyperlipidemia   • Essential hypertension   • Keratosis   • Borderline type 2 diabetes mellitus   • Left shoulder pain   • Morbid obesity (HCC)   • Peripheral edema   • Degeneration of lumbar intervertebral disc   • History of compression fracture of spine   • Lumbosacral spondylosis without myelopathy   • Chronic midline low back pain with right-sided sciatica   • Thoracic spondylosis without myelopathy   • JAKOB on CPAP   • Chronic pain of right ankle   • Benign prostatic hyperplasia with nocturia   • Diverticulosis     Past Medical History:   Diagnosis Date   • Benign essential hypertension    • Bruit of left carotid artery 9/13/2018   • Diabetes mellitus (HCC)     diet controlled   • GERD (gastroesophageal reflux disease)    • Headache    • Hip pain, left 1/29/2016   • Lymphadenopathy of head and neck 7/29/2021   • Myofascial pain 10/26/2020   • Pain of both elbows 8/28/2017   • Sleep apnea     wears cpap   • Umbilical hernia 1/29/2016     Past Surgical History:   Procedure Laterality Date   • COLONOSCOPY  03/03/2016   • HEMORRHOIDECTOMY     • TUMOR REMOVAL      from groin, benign   • UMBILICAL HERNIA REPAIR  03/03/2016     Family History   Problem Relation Age of Onset   • Cancer Father         prostate   • Hypertension Other    • Hypertension Mother    • Cancer Paternal Uncle    • Cancer Paternal Grandfather        Current Outpatient Medications:    •  amLODIPine (NORVASC) 10 MG tablet, Take 1 tablet by mouth Daily., Disp: 90 tablet, Rfl: 3  •  APPLE CIDER VINEGAR PO, Take  by mouth., Disp: , Rfl:   •  aspirin 81 MG EC tablet, Take 81 mg by mouth Daily., Disp: , Rfl:   •  atenolol (TENORMIN) 100 MG tablet, Take 0.5 tablets by mouth Daily. Pt will start taking 1/2 po qd, Disp: 90 tablet, Rfl: 3  •  atorvastatin (LIPITOR) 40 MG tablet, Take 1 tablet by mouth Daily., Disp: 90 tablet, Rfl: 3  •  B Complex Vitamins (vitamin b complex) capsule capsule, Take  by mouth Daily., Disp: , Rfl:   •  benzonatate (TESSALON) 200 MG capsule, Take 1 capsule by mouth 3 (Three) Times a Day As Needed for Cough., Disp: 20 capsule, Rfl: 0  •  Cinnamon 500 MG capsule, Take 1,000 mg by mouth Daily., Disp: , Rfl:   •  Coenzyme Q10 (CO Q 10 PO), Take by mouth., Disp: , Rfl:   •  Cyanocobalamin (B-12 PO), Take  by mouth., Disp: , Rfl:   •  cyclobenzaprine (FEXMID) 7.5 MG tablet, Take 1 tablet by mouth At Night As Needed for Muscle Spasms., Disp: 90 tablet, Rfl: 3  •  hydroCHLOROthiazide (HYDRODIURIL) 25 MG tablet, Take 1 tablet by mouth Daily., Disp: 90 tablet, Rfl: 3  •  KRILL OIL PO, Take  by mouth., Disp: , Rfl:   •  losartan (COZAAR) 100 MG tablet, Take 1 tablet by mouth Daily., Disp: 90 tablet, Rfl: 3  •  melatonin 1 MG tablet, Take 3 mg by mouth Every Night., Disp: , Rfl:   •  metFORMIN (GLUCOPHAGE) 500 MG tablet, Take 1 tablet by mouth Daily With Breakfast., Disp: 90 tablet, Rfl: 3  •  Scopolamine (Transderm-Scop, 1.5 MG,) 1 MG/3DAYS patch, Place 1 patch on the skin as directed by provider Every 72 (Seventy-Two) Hours., Disp: 4 patch, Rfl: 0  •  tamsulosin (FLOMAX) 0.4 MG capsule 24 hr capsule, Take 1 capsule by mouth Daily., Disp: 30 capsule, Rfl: 1  •  sulfamethoxazole-trimethoprim (Bactrim DS) 800-160 MG per tablet, Take 1 tablet twice daily for up to 6 weeks, Disp: 48 tablet, Rfl: 0  Allergies   Allergen Reactions   • Codeine Other (See Comments)     tremors   • Valsartan  Angioedema   • Hydrocodone-Acetaminophen Other (See Comments)     Made me real hot     Social History     Tobacco Use   • Smoking status: Never Smoker   • Smokeless tobacco: Never Used   Substance Use Topics   • Alcohol use: Yes     Comment: occasional   • Drug use: No          Objective   Physical Exam  Vitals:    05/12/22 1555 05/12/22 1610   BP: 152/80 140/64   Pulse: 57    Temp: 97.1 °F (36.2 °C)    TempSrc: Temporal    SpO2: 97%    Weight: (!) 140 kg (308 lb 12.8 oz)      Body mass index is 37.59 kg/m².    Constitutional: NAD.  Eyes: EOMI. PERRLA. Normal conjunctiva.  Ear, nose, mouth, throat: Normal external ear canals and TMs bilaterally.  Cardiovascular: RRR. No murmurs. No LE edema b/l. Radial pulses 2+ bilaterally.  Pulmonary: CTA b/l. Good effort.  Integumentary: No rashes or wounds on face or upper extremities.  Lymphatic: No anterior cervical lymphadenopathy.  Endocrine: No thyromegaly or palpable thyroid nodules.  Psychiatric: Normal affect. Normal thought content.     Assessment & Plan   Assessment and Plan  Very pleasant 61-year-old male with hypertension, hyperlipidemia, borderline type 2 diabetes, JKAOB on CPAP, polyarthritis, BPH, GERD, BMI greater than 35, who presents for the following:    Diagnoses and all orders for this visit:    1. Annual physical exam (Primary): We discussed preventative care including age and patient-appropriate immunizations and cancer screening. We also discussed the importance of exercise and a healthy diet. This is their annual preventative exam.    2. Essential hypertension: Not optimally controlled but okay to follow due to ongoing weight loss and diet changes  -     amLODIPine (NORVASC) 10 MG tablet; Take 1 tablet by mouth Daily.  Dispense: 90 tablet; Refill: 3    3. Mixed hyperlipidemia  -     Comprehensive Metabolic Panel; Future  -     CBC & Differential; Future  -     Thyroid Panel With TSH; Future  -     Lipid Panel; Future    4. Borderline type 2 diabetes  mellitus: Tolerating metformin so we have room to increase if needed  -     Hemoglobin A1c; Future    5. Benign prostatic hyperplasia with nocturia: Discussed the risks and benefits of Flomax  -     tamsulosin (FLOMAX) 0.4 MG capsule 24 hr capsule; Take 1 capsule by mouth Daily.  Dispense: 30 capsule; Refill: 1    6. Chronic prostatitis: Due to improved symptoms on Bactrim but relapsed after stopping, will treat as chronic prostatitis up to 6 weeks of Bactrim.  Recommend he reevaluate therapy approximately every 2 weeks.  Urine culture as below.  -     sulfamethoxazole-trimethoprim (Bactrim DS) 800-160 MG per tablet; Take 1 tablet twice daily for up to 6 weeks  Dispense: 48 tablet; Refill: 0  -     Urine Culture - Urine, Urine, Clean Catch; Future    7. Polyarthritis: Agree with job change if possible, his arthritis and functional impairment will only worsen with continued hard labor  -     cyclobenzaprine (FEXMID) 7.5 MG tablet; Take 1 tablet by mouth At Night As Needed for Muscle Spasms.  Dispense: 90 tablet; Refill: 3    8. Gastroesophageal reflux disease without esophagitis: Resolved    9. JAKOB on CPAP: Adherent    10. Peripheral edema: Not present on exam today    11. Morbid obesity (HCC): Encourage continued weight loss    12. Diverticulosis    Follow-up in 6 months for borderline diabetes    Ramirez Hernandez MD  Family Medicine  O: [phone]  C: [phone]    Disclaimer: Parts of this note were dictated by speech recognition. Minor errors in transcription may be present. Please call if questions.

## 2022-05-13 DIAGNOSIS — E78.2 MIXED HYPERLIPIDEMIA: ICD-10-CM

## 2022-05-13 DIAGNOSIS — R73.03 BORDERLINE TYPE 2 DIABETES MELLITUS: ICD-10-CM

## 2022-05-15 LAB
ALBUMIN SERPL-MCNC: 4.5 G/DL (ref 3.8–4.9)
ALBUMIN/GLOB SERPL: 2 {RATIO} (ref 1.2–2.2)
ALP SERPL-CCNC: 54 IU/L (ref 44–121)
ALT SERPL-CCNC: 40 IU/L (ref 0–44)
AST SERPL-CCNC: 24 IU/L (ref 0–40)
BACTERIA UR CULT: NORMAL
BACTERIA UR CULT: NORMAL
BASOPHILS # BLD AUTO: 0 X10E3/UL (ref 0–0.2)
BASOPHILS NFR BLD AUTO: 1 %
BILIRUB SERPL-MCNC: 1 MG/DL (ref 0–1.2)
BUN SERPL-MCNC: 14 MG/DL (ref 6–24)
BUN/CREAT SERPL: 16 (ref 9–20)
CALCIUM SERPL-MCNC: 8.7 MG/DL (ref 8.7–10.2)
CHLORIDE SERPL-SCNC: 104 MMOL/L (ref 96–106)
CHOLEST SERPL-MCNC: 126 MG/DL (ref 100–199)
CO2 SERPL-SCNC: 21 MMOL/L (ref 20–29)
CREAT SERPL-MCNC: 0.86 MG/DL (ref 0.76–1.27)
EGFRCR SERPLBLD CKD-EPI 2021: 100 ML/MIN/1.73
EOSINOPHIL # BLD AUTO: 0.1 X10E3/UL (ref 0–0.4)
EOSINOPHIL NFR BLD AUTO: 2 %
ERYTHROCYTE [DISTWIDTH] IN BLOOD BY AUTOMATED COUNT: 12.9 % (ref 11.6–15.4)
FT4I SERPL CALC-MCNC: 1.9 (ref 1.2–4.9)
GLOBULIN SER CALC-MCNC: 2.2 G/DL (ref 1.5–4.5)
GLUCOSE SERPL-MCNC: 136 MG/DL (ref 65–99)
HBA1C MFR BLD: 6.5 % (ref 4.8–5.6)
HCT VFR BLD AUTO: 45.1 % (ref 37.5–51)
HDLC SERPL-MCNC: 32 MG/DL
HGB BLD-MCNC: 15.5 G/DL (ref 13–17.7)
IMM GRANULOCYTES # BLD AUTO: 0 X10E3/UL (ref 0–0.1)
IMM GRANULOCYTES NFR BLD AUTO: 0 %
LDLC SERPL CALC-MCNC: 72 MG/DL (ref 0–99)
LYMPHOCYTES # BLD AUTO: 1.4 X10E3/UL (ref 0.7–3.1)
LYMPHOCYTES NFR BLD AUTO: 31 %
MCH RBC QN AUTO: 30.3 PG (ref 26.6–33)
MCHC RBC AUTO-ENTMCNC: 34.4 G/DL (ref 31.5–35.7)
MCV RBC AUTO: 88 FL (ref 79–97)
MONOCYTES # BLD AUTO: 0.5 X10E3/UL (ref 0.1–0.9)
MONOCYTES NFR BLD AUTO: 11 %
NEUTROPHILS # BLD AUTO: 2.6 X10E3/UL (ref 1.4–7)
NEUTROPHILS NFR BLD AUTO: 55 %
PLATELET # BLD AUTO: 150 X10E3/UL (ref 150–450)
POTASSIUM SERPL-SCNC: 3.8 MMOL/L (ref 3.5–5.2)
PROT SERPL-MCNC: 6.7 G/DL (ref 6–8.5)
RBC # BLD AUTO: 5.12 X10E6/UL (ref 4.14–5.8)
SODIUM SERPL-SCNC: 142 MMOL/L (ref 134–144)
T3RU NFR SERPL: 30 % (ref 24–39)
T4 SERPL-MCNC: 6.2 UG/DL (ref 4.5–12)
TRIGL SERPL-MCNC: 123 MG/DL (ref 0–149)
TSH SERPL DL<=0.005 MIU/L-ACNC: 1 UIU/ML (ref 0.45–4.5)
VLDLC SERPL CALC-MCNC: 22 MG/DL (ref 5–40)
WBC # BLD AUTO: 4.7 X10E3/UL (ref 3.4–10.8)

## 2022-05-17 NOTE — PROGRESS NOTES
Overall your blood work looks good.  Your cholesterol remains very well controlled.  Your borderline diabetes also remains very well controlled.  I do not recommend any medication changes at this time.

## 2022-07-21 DIAGNOSIS — R35.1 BENIGN PROSTATIC HYPERPLASIA WITH NOCTURIA: ICD-10-CM

## 2022-07-21 DIAGNOSIS — N40.1 BENIGN PROSTATIC HYPERPLASIA WITH NOCTURIA: ICD-10-CM

## 2022-07-21 RX ORDER — TAMSULOSIN HYDROCHLORIDE 0.4 MG/1
CAPSULE ORAL
Qty: 30 CAPSULE | Refills: 1 | Status: SHIPPED | OUTPATIENT
Start: 2022-07-21 | End: 2022-09-19

## 2022-09-19 DIAGNOSIS — N40.1 BENIGN PROSTATIC HYPERPLASIA WITH NOCTURIA: ICD-10-CM

## 2022-09-19 DIAGNOSIS — R35.1 BENIGN PROSTATIC HYPERPLASIA WITH NOCTURIA: ICD-10-CM

## 2022-09-19 RX ORDER — TAMSULOSIN HYDROCHLORIDE 0.4 MG/1
CAPSULE ORAL
Qty: 30 CAPSULE | Refills: 1 | Status: SHIPPED | OUTPATIENT
Start: 2022-09-19

## 2022-10-10 ENCOUNTER — TELEPHONE (OUTPATIENT)
Dept: INTERNAL MEDICINE | Facility: CLINIC | Age: 62
End: 2022-10-10

## 2022-10-10 DIAGNOSIS — T14.8XXA ABRASION: Primary | ICD-10-CM

## 2022-10-10 RX ORDER — GINSENG 100 MG
1 CAPSULE ORAL 2 TIMES DAILY PRN
Qty: 28 G | Refills: 1 | Status: SHIPPED | OUTPATIENT
Start: 2022-10-10 | End: 2022-12-27

## 2022-10-10 RX ORDER — GINSENG 100 MG
1 CAPSULE ORAL 2 TIMES DAILY PRN
Qty: 28 G | Refills: 1 | Status: SHIPPED | OUTPATIENT
Start: 2022-10-10 | End: 2022-10-10 | Stop reason: SDUPTHER

## 2022-10-10 NOTE — TELEPHONE ENCOUNTER
PATIENT IS CALLING IN HE STATES THAT HIS [facility] DID NOT RECEIVE THIS MEDICATION. LOOKS LIKE IT WENT TO MAIL ORDER AND IT NEEDS TO BE SENT LOCAL.    CONFIRMED PHARMACY    [facility] [id number] - [facility] [id number] AT [facility] [phone] [facility] [phone] FX

## 2022-10-10 NOTE — TELEPHONE ENCOUNTER
Rx Refill Note  Requested Prescriptions     Pending Prescriptions Disp Refills   • bacitracin 500 UNIT/GM ointment 28 g 1     Sig: Apply 1 application topically to the appropriate area as directed 2 (Two) Times a Day As Needed for Wound Care.      Last office visit with prescribing clinician: 5/12/2022      Next office visit with prescribing clinician: 11/14/2022            Jerilyn Phillips MA  10/10/22, 16:09 EDT

## 2022-10-10 NOTE — TELEPHONE ENCOUNTER
Pt came in office with family member for appt with Hamida Amado DNP.    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    Pt wanted to know if a cream or antibacterial ointment could be sent to pharmacy.    No available appts at this time.    Pharmacy: [facility] [id number]

## 2022-12-20 DIAGNOSIS — I10 ESSENTIAL HYPERTENSION: ICD-10-CM

## 2022-12-20 RX ORDER — ATENOLOL 50 MG/1
50 TABLET ORAL DAILY
Qty: 90 TABLET | Refills: 3 | Status: SHIPPED | OUTPATIENT
Start: 2022-12-20 | End: 2023-04-05 | Stop reason: SDUPTHER

## 2022-12-20 NOTE — TELEPHONE ENCOUNTER
Caller: Gerard Gonzalez     Relationship: Self    Best call back number: [phone]    Requested Prescriptions:   Requested Prescriptions     Pending Prescriptions Disp Refills   • atenolol (TENORMIN) 100 MG tablet 90 tablet 3     Sig: Take 0.5 tablets by mouth Daily. Pt will start taking 1/2 po qd        Pharmacy where request should be sent: [facility] - [address] - [phone]  - [phone] FX     Additional details provided by patient:     Does the patient have less than a 3 day supply:  [] Yes  [x] No    Would you like a call back once the refill request has been completed: [] Yes [] No    If the office needs to give you a call back, can they leave a voicemail: [] Yes [] No    Debbie Davis Rep   12/20/22 15:31 EST

## 2022-12-27 ENCOUNTER — OFFICE VISIT (OUTPATIENT)
Dept: INTERNAL MEDICINE | Facility: CLINIC | Age: 62
End: 2022-12-27

## 2022-12-27 VITALS
WEIGHT: 315 LBS | TEMPERATURE: 97.3 F | BODY MASS INDEX: 39.15 KG/M2 | DIASTOLIC BLOOD PRESSURE: 82 MMHG | HEART RATE: 55 BPM | OXYGEN SATURATION: 100 % | SYSTOLIC BLOOD PRESSURE: 130 MMHG

## 2022-12-27 DIAGNOSIS — R05.9 COUGH, UNSPECIFIED TYPE: ICD-10-CM

## 2022-12-27 DIAGNOSIS — R73.03 BORDERLINE TYPE 2 DIABETES MELLITUS: ICD-10-CM

## 2022-12-27 DIAGNOSIS — J06.9 VIRAL URI: Primary | ICD-10-CM

## 2022-12-27 DIAGNOSIS — G89.29 CHRONIC PAIN OF RIGHT ANKLE: ICD-10-CM

## 2022-12-27 DIAGNOSIS — M25.571 CHRONIC PAIN OF RIGHT ANKLE: ICD-10-CM

## 2022-12-27 LAB
EXPIRATION DATE: ABNORMAL
EXPIRATION DATE: NORMAL
FLUAV AG UPPER RESP QL IA.RAPID: NOT DETECTED
FLUBV AG UPPER RESP QL IA.RAPID: NOT DETECTED
HBA1C MFR BLD: 7.4 % (ref 4.8–5.6)
INTERNAL CONTROL: NORMAL
Lab: ABNORMAL
Lab: NORMAL
SARS-COV-2 AG UPPER RESP QL IA.RAPID: NOT DETECTED

## 2022-12-27 PROCEDURE — 87428 SARSCOV & INF VIR A&B AG IA: CPT | Performed by: FAMILY MEDICINE

## 2022-12-27 PROCEDURE — 36416 COLLJ CAPILLARY BLOOD SPEC: CPT | Performed by: FAMILY MEDICINE

## 2022-12-27 PROCEDURE — 83036 HEMOGLOBIN GLYCOSYLATED A1C: CPT | Performed by: FAMILY MEDICINE

## 2022-12-27 PROCEDURE — 99214 OFFICE O/P EST MOD 30 MIN: CPT | Performed by: FAMILY MEDICINE

## 2022-12-27 PROCEDURE — 3051F HG A1C>EQUAL 7.0%<8.0%: CPT | Performed by: FAMILY MEDICINE

## 2022-12-27 NOTE — PROGRESS NOTES
Date of Encounter: 2022  Patient: Gerard Gonzalez,  1960    Subjective   History of Presenting Illness  Chief complaint: Follow-up borderline type 2 diabetes    Cough with runny nose, sore throat for 2 days.  Patient feels symptoms are mild.  No fever, shortness of breath, chest pain, nausea, vomiting, or diarrhea.  Home COVID test negative.  Point-of-care COVID and influenza test are negative.     Type 2 diabetes tolerating metformin once daily well.  Does not check his blood glucose.  Recent weight gain with poor diet related to having to take care of parents.  Wants to improve his diet but it is not the best time.    Arthritis is getting a little bit better with recent job change, taking a tart cherry gummy nightly.    Review of Systems:  Negative for fever, chest pain, shortness of breath    The following portions of the patient's history were reviewed and updated as appropriate: allergies, current medications, past family history, past medical history, past social history, past surgical history and problem list.    Patient Active Problem List   Diagnosis   • Gastroesophageal reflux disease without esophagitis   • Mixed hyperlipidemia   • Essential hypertension   • Keratosis   • Borderline type 2 diabetes mellitus   • Left shoulder pain   • Morbid obesity (HCC)   • Peripheral edema   • Degeneration of lumbar intervertebral disc   • History of compression fracture of spine   • Lumbosacral spondylosis without myelopathy   • Chronic midline low back pain with right-sided sciatica   • Thoracic spondylosis without myelopathy   • JAKOB on CPAP   • Chronic pain of right ankle   • Benign prostatic hyperplasia with nocturia   • Diverticulosis     Past Medical History:   Diagnosis Date   • Benign essential hypertension    • Bruit of left carotid artery 2018   • Diabetes mellitus (HCC)     diet controlled   • GERD (gastroesophageal reflux disease)    • Headache    • Hip pain, left 2016   •  Lymphadenopathy of head and neck 7/29/2021   • Myofascial pain 10/26/2020   • Pain of both elbows 8/28/2017   • Sleep apnea     wears cpap   • Umbilical hernia 1/29/2016     Past Surgical History:   Procedure Laterality Date   • COLONOSCOPY  03/03/2016   • HEMORRHOIDECTOMY     • TUMOR REMOVAL      from groin, benign   • UMBILICAL HERNIA REPAIR  03/03/2016     Family History   Problem Relation Age of Onset   • Cancer Father         prostate   • Hypertension Other    • Hypertension Mother    • Cancer Paternal Uncle    • Cancer Paternal Grandfather        Current Outpatient Medications:   •  amLODIPine (NORVASC) 10 MG tablet, Take 1 tablet by mouth Daily., Disp: 90 tablet, Rfl: 3  •  APPLE CIDER VINEGAR PO, Take  by mouth., Disp: , Rfl:   •  aspirin 81 MG EC tablet, Take 81 mg by mouth Daily., Disp: , Rfl:   •  atenolol (TENORMIN) 50 MG tablet, Take 1 tablet by mouth Daily. Pt will start taking 1/2 po qd, Disp: 90 tablet, Rfl: 3  •  atorvastatin (LIPITOR) 40 MG tablet, Take 1 tablet by mouth Daily., Disp: 90 tablet, Rfl: 3  •  B Complex Vitamins (vitamin b complex) capsule capsule, Take  by mouth Daily., Disp: , Rfl:   •  Cinnamon 500 MG capsule, Take 1,000 mg by mouth Daily., Disp: , Rfl:   •  Coenzyme Q10 (CO Q 10 PO), Take by mouth., Disp: , Rfl:   •  Cyanocobalamin (B-12 PO), Take  by mouth., Disp: , Rfl:   •  cyclobenzaprine (FEXMID) 7.5 MG tablet, Take 1 tablet by mouth At Night As Needed for Muscle Spasms., Disp: 90 tablet, Rfl: 3  •  hydroCHLOROthiazide (HYDRODIURIL) 25 MG tablet, Take 1 tablet by mouth Daily., Disp: 90 tablet, Rfl: 3  •  KRILL OIL PO, Take  by mouth., Disp: , Rfl:   •  losartan (COZAAR) 100 MG tablet, Take 1 tablet by mouth Daily., Disp: 90 tablet, Rfl: 3  •  melatonin 1 MG tablet, Take 3 mg by mouth Every Night., Disp: , Rfl:   •  metFORMIN (GLUCOPHAGE) 500 MG tablet, Take 1 tablet by mouth Daily With Breakfast., Disp: 90 tablet, Rfl: 3  •  Scopolamine (Transderm-Scop, 1.5 MG,) 1 MG/3DAYS  patch, Place 1 patch on the skin as directed by provider Every 72 (Seventy-Two) Hours., Disp: 4 patch, Rfl: 0  •  tamsulosin (FLOMAX) 0.4 MG capsule 24 hr capsule, TAKE 1 CAPSULE DAILY, Disp: 30 capsule, Rfl: 1  Allergies   Allergen Reactions   • Codeine Other (See Comments)     tremors   • Valsartan Angioedema   • Hydrocodone-Acetaminophen Other (See Comments)     Made me real hot     Social History     Tobacco Use   • Smoking status: Never   • Smokeless tobacco: Never   Substance Use Topics   • Alcohol use: Not Currently     Comment: occasional   • Drug use: No          Objective   Physical Exam  Vitals:    12/27/22 1516   BP: 130/82   BP Location: Left arm   Patient Position: Sitting   Pulse: 55   Temp: 97.3 °F (36.3 °C)   TempSrc: Temporal   SpO2: 100%   Weight: (!) 146 kg (321 lb 9.6 oz)     Body mass index is 39.15 kg/m².    Constitutional: NAD.  Eyes: EOMI. PERRLA. Normal conjunctiva.  Ear, nose, mouth, throat: No tonsillar exudates or erythema.   Normal nasal mucosa with clear rhinorrhea. Normal external ear canals and TMs bilaterally.  Cardiovascular: RRR. No murmurs. No LE edema b/l. Radial pulses 2+ bilaterally.  Pulmonary: CTA b/l. Good effort.  Integumentary: No rashes or wounds on face or upper extremities.  Lymphatic: No anterior cervical lymphadenopathy.  Endocrine: No thyromegaly or palpable thyroid nodules.  Psychiatric: Normal affect. Normal thought content.     Assessment & Plan   Assessment and Plan  Very pleasant 62-year-old male with hypertension, hyperlipidemia, borderline type 2 diabetes, JAKOB on CPAP, polyarthritis, BPH, GERD, BMI greater than 35, who presents for the following:     Diagnoses and all orders for this visit:    1. Viral URI (Primary): Mild symptoms with negative COVID and influenza testing.  Due to current duration of only few days I recommend conservative management and watchful waiting, discussed reasons to return.  If his symptoms were to worsen over the next week I am okay  with treating with antibiotics by phone call, discussed symptoms that would warrant this  2. Cough, unspecified type  -     POCT SARS-CoV-2 Antigen MARK    3. Borderline type 2 diabetes mellitus: A1c has increased to 7.4%.  We will increase metformin to 1000MG twice daily as long as he tolerates it, alternatives include GLP-1 in the future  -     POC Glycosylated Hemoglobin (Hb A1C)    4. Chronic pain of right ankle: Improving with tart cherry, continue to follow uric acid as his levels were borderline in 2020    Ramirez Hernandez MD  Family Medicine  O: [phone]    Disclaimer: Parts of this note were dictated by speech recognition. Minor errors in transcription may be present. Please call if questions.

## 2023-02-20 DIAGNOSIS — I10 ESSENTIAL HYPERTENSION: ICD-10-CM

## 2023-02-20 DIAGNOSIS — Z99.89 OSA ON CPAP: Primary | ICD-10-CM

## 2023-02-20 DIAGNOSIS — K21.9 GASTROESOPHAGEAL REFLUX DISEASE WITHOUT ESOPHAGITIS: ICD-10-CM

## 2023-02-20 DIAGNOSIS — E78.2 MIXED HYPERLIPIDEMIA: ICD-10-CM

## 2023-02-20 DIAGNOSIS — G47.33 OSA ON CPAP: Primary | ICD-10-CM

## 2023-02-20 DIAGNOSIS — R73.03 BORDERLINE TYPE 2 DIABETES MELLITUS: ICD-10-CM

## 2023-02-20 RX ORDER — LOSARTAN POTASSIUM 100 MG/1
100 TABLET ORAL DAILY
Qty: 90 TABLET | Refills: 3 | Status: SHIPPED | OUTPATIENT
Start: 2023-02-20

## 2023-02-20 RX ORDER — ATORVASTATIN CALCIUM 40 MG/1
40 TABLET, FILM COATED ORAL DAILY
Qty: 90 TABLET | Refills: 3 | Status: SHIPPED | OUTPATIENT
Start: 2023-02-20

## 2023-02-20 RX ORDER — LANOLIN ALCOHOL/MO/W.PET/CERES
3 CREAM (GRAM) TOPICAL NIGHTLY
Qty: 90 TABLET | Refills: 3 | Status: SHIPPED | OUTPATIENT
Start: 2023-02-20

## 2023-02-20 RX ORDER — HYDROCHLOROTHIAZIDE 25 MG/1
25 TABLET ORAL DAILY
Qty: 90 TABLET | Refills: 3 | Status: SHIPPED | OUTPATIENT
Start: 2023-02-20

## 2023-02-20 NOTE — TELEPHONE ENCOUNTER
Rx Refill Note  Requested Prescriptions     Pending Prescriptions Disp Refills   • atorvastatin (LIPITOR) 40 MG tablet 90 tablet 3     Sig: Take 1 tablet by mouth Daily.   • losartan (COZAAR) 100 MG tablet 90 tablet 3     Sig: Take 1 tablet by mouth Daily.   • melatonin 1 MG tablet 30 tablet      Sig: Take 3 tablets by mouth Every Night.   • hydroCHLOROthiazide (HYDRODIURIL) 25 MG tablet 90 tablet 3     Sig: Take 1 tablet by mouth Daily.      Last office visit with prescribing clinician: 12/27/2022   Last telemedicine visit with prescribing clinician: Visit date not found   Next office visit with prescribing clinician: Visit date not found

## 2023-02-21 RX ORDER — HYDROCHLOROTHIAZIDE 25 MG/1
25 TABLET ORAL DAILY
Qty: 90 TABLET | Refills: 3 | OUTPATIENT
Start: 2023-02-21

## 2023-02-21 RX ORDER — LOSARTAN POTASSIUM 100 MG/1
100 TABLET ORAL DAILY
Qty: 90 TABLET | Refills: 3 | OUTPATIENT
Start: 2023-02-21

## 2023-02-21 RX ORDER — ATORVASTATIN CALCIUM 40 MG/1
40 TABLET, FILM COATED ORAL DAILY
Qty: 90 TABLET | Refills: 3 | OUTPATIENT
Start: 2023-02-21

## 2023-04-04 ENCOUNTER — TELEPHONE (OUTPATIENT)
Dept: INTERNAL MEDICINE | Facility: CLINIC | Age: 63
End: 2023-04-04
Payer: MEDICAID

## 2023-04-04 NOTE — TELEPHONE ENCOUNTER
Caller: Gerard Gonzalez    Relationship to patient: Self    Best call back number: [phone]    Patient is needing:  PATIENT ISNT SURE ABOUT HOW MUCH OF THE    atenolol (TENORMIN) 50 MG tablet     HE IS SUPPOSED TO BE TAKING. PLEASE REACH OUT TO PATIENT AND ADVISE.

## 2023-04-05 DIAGNOSIS — I10 ESSENTIAL HYPERTENSION: ICD-10-CM

## 2023-04-05 RX ORDER — ATENOLOL 50 MG/1
50 TABLET ORAL DAILY
Qty: 90 TABLET | Refills: 3 | Status: SHIPPED | OUTPATIENT
Start: 2023-04-05

## 2023-05-06 DIAGNOSIS — I10 ESSENTIAL HYPERTENSION: ICD-10-CM

## 2023-05-07 DIAGNOSIS — R35.1 BENIGN PROSTATIC HYPERPLASIA WITH NOCTURIA: ICD-10-CM

## 2023-05-07 DIAGNOSIS — I10 ESSENTIAL HYPERTENSION: ICD-10-CM

## 2023-05-07 DIAGNOSIS — N40.1 BENIGN PROSTATIC HYPERPLASIA WITH NOCTURIA: ICD-10-CM

## 2023-05-08 RX ORDER — TAMSULOSIN HYDROCHLORIDE 0.4 MG/1
1 CAPSULE ORAL DAILY
Qty: 90 CAPSULE | Refills: 0 | Status: SHIPPED | OUTPATIENT
Start: 2023-05-08

## 2023-05-08 RX ORDER — AMLODIPINE BESYLATE 10 MG/1
TABLET ORAL
Qty: 90 TABLET | Refills: 3 | Status: SHIPPED | OUTPATIENT
Start: 2023-05-08

## 2023-05-08 RX ORDER — AMLODIPINE BESYLATE 10 MG/1
10 TABLET ORAL DAILY
Qty: 90 TABLET | Refills: 3 | OUTPATIENT
Start: 2023-05-08

## 2023-07-27 DIAGNOSIS — R35.1 BENIGN PROSTATIC HYPERPLASIA WITH NOCTURIA: ICD-10-CM

## 2023-07-27 DIAGNOSIS — N40.1 BENIGN PROSTATIC HYPERPLASIA WITH NOCTURIA: ICD-10-CM

## 2023-07-28 RX ORDER — TAMSULOSIN HYDROCHLORIDE 0.4 MG/1
CAPSULE ORAL
Qty: 90 CAPSULE | Refills: 0 | Status: SHIPPED | OUTPATIENT
Start: 2023-07-28

## 2023-08-25 ENCOUNTER — TELEPHONE (OUTPATIENT)
Dept: INTERNAL MEDICINE | Facility: CLINIC | Age: 63
End: 2023-08-25
Payer: MEDICAID

## 2023-08-25 ENCOUNTER — HOSPITAL ENCOUNTER (EMERGENCY)
Facility: HOSPITAL | Age: 63
Discharge: HOME | End: 2023-08-25
Attending: EMERGENCY MEDICINE
Payer: MEDICAID

## 2023-08-25 VITALS
TEMPERATURE: 98.4 F | DIASTOLIC BLOOD PRESSURE: 72 MMHG | HEART RATE: 55 BPM | OXYGEN SATURATION: 93 % | SYSTOLIC BLOOD PRESSURE: 160 MMHG | WEIGHT: 315 LBS | RESPIRATION RATE: 18 BRPM | BODY MASS INDEX: 38.36 KG/M2 | HEIGHT: 76 IN

## 2023-08-25 DIAGNOSIS — R09.89 LABILE BLOOD PRESSURE: Primary | ICD-10-CM

## 2023-08-25 DIAGNOSIS — R03.0 ELEVATED BLOOD PRESSURE READING: Primary | ICD-10-CM

## 2023-08-25 PROCEDURE — 99282 EMERGENCY DEPT VISIT SF MDM: CPT

## 2023-08-25 RX ORDER — HYDRALAZINE HYDROCHLORIDE 25 MG/1
TABLET, FILM COATED ORAL
Qty: 30 TABLET | Refills: 0 | Status: SHIPPED | OUTPATIENT
Start: 2023-08-25 | End: 2023-08-25

## 2023-08-25 NOTE — TELEPHONE ENCOUNTER
Pt calling stating that he had just taken his BP and that it is 155/85 currently. Pt states that he is not having any SOA, no chest pain, he is still having a bit of residual head pain from headache yesterday.    Pt states that normal for him is 134/76 and that it has been about 4- 5 years since a spike in BP like this.     Pt would like to know if he should go ahead and have medication for over the weekend in case this spikes. He states that he knows signs to watch for, pt advised to go to ER if he experiences any of these. Pt understanding.    Please advise.

## 2023-08-25 NOTE — ED TRIAGE NOTES
HTN with headache, pt called PCP today and spoke with them and they did not return call or send med to pharmacy

## 2023-08-25 NOTE — TELEPHONE ENCOUNTER
Pt calling today stating that he had missed a dose of one of his three medications for Blood pressure yesterday: Losartan, Atenolol, & Amlodipine. Pt states that he was not sure which one he had missed.     Pt states that he had a really bad headache and checked his blood pressure and it was 180/78. Pt took medication, and headache has gone away at this time.     Pt states that he was wanting to take an extra dose of his medications, pt was advised to hold off on taking extra dose until reply received from provider.     I advised him that we could get him an appointment to be seen to discuss, and pt declined stating that he has had ongoing BP issues since his early 30s.

## 2023-08-25 NOTE — TELEPHONE ENCOUNTER
He should not take an extra dose of any of his medications as he is at the maximum dose of these in his context    Just have him go back to his usual routine and continue to monitor blood pressure    If continues to be very high with symptoms before the end of the day he should call us before 430 so I can send in something for as needed use

## 2023-08-26 NOTE — ED PROVIDER NOTES
Subjective     Hypertension  Severity:  Moderate  Onset quality:  Gradual  Timing:  Intermittent  Progression:  Partially resolved  Chronicity:  Chronic  Context comment:  BP labile today, assoc w/milf frontal ha, o/w asymptomatic  Relieved by:  Nothing  Worsened by:  Nothing  Associated symptoms: no abdominal pain, no chest pain, no dizziness, no fever, no peripheral edema, no shortness of breath and no weakness      Review of Systems   Constitutional:  Negative for fever.   Respiratory:  Negative for shortness of breath.    Cardiovascular:  Negative for chest pain.   Gastrointestinal:  Negative for abdominal pain.   Neurological:  Negative for dizziness and weakness.   All other systems reviewed and are negative.    Past Medical History:   Diagnosis Date    Benign essential hypertension     Bruit of left carotid artery 9/13/2018    Diabetes mellitus     diet controlled    GERD (gastroesophageal reflux disease)     Headache     Hip pain, left 1/29/2016    Lymphadenopathy of head and neck 7/29/2021    Myofascial pain 10/26/2020    Pain of both elbows 8/28/2017    Sleep apnea     wears cpap    Umbilical hernia 1/29/2016       Allergies   Allergen Reactions    Codeine Other (See Comments)     tremors    Valsartan Angioedema    Hydrocodone-Acetaminophen Other (See Comments)     Made me real hot       Past Surgical History:   Procedure Laterality Date    COLONOSCOPY  03/03/2016    HEMORRHOIDECTOMY      TUMOR REMOVAL      from groin, benign    UMBILICAL HERNIA REPAIR  03/03/2016       Family History   Problem Relation Age of Onset    Cancer Father         prostate    Hypertension Other     Hypertension Mother     Cancer Paternal Uncle     Cancer Paternal Grandfather        Social History     Socioeconomic History    Marital status: Single   Tobacco Use    Smoking status: Never    Smokeless tobacco: Never   Substance and Sexual Activity    Alcohol use: Not Currently     Comment: occasional    Drug use: No    Sexual  activity: Not Currently           Objective   Physical Exam  Vitals and nursing note reviewed.   Constitutional:       Appearance: Normal appearance. He is not ill-appearing or diaphoretic.   HENT:      Head: Normocephalic and atraumatic.      Nose: Nose normal. No rhinorrhea.      Mouth/Throat:      Pharynx: Oropharynx is clear. No oropharyngeal exudate.   Eyes:      Extraocular Movements: Extraocular movements intact.      Conjunctiva/sclera: Conjunctivae normal.      Pupils: Pupils are equal, round, and reactive to light.   Cardiovascular:      Rate and Rhythm: Normal rate and regular rhythm.      Pulses: Normal pulses.      Heart sounds: Normal heart sounds. No murmur heard.  Pulmonary:      Effort: Pulmonary effort is normal.      Breath sounds: Normal breath sounds. No wheezing or rhonchi.   Abdominal:      General: Abdomen is flat.      Palpations: Abdomen is soft.      Tenderness: There is no abdominal tenderness. There is no guarding.   Musculoskeletal:         General: No swelling. Normal range of motion.      Cervical back: Normal range of motion and neck supple.      Right lower leg: No edema.      Left lower leg: No edema.   Skin:     General: Skin is warm and dry.      Capillary Refill: Capillary refill takes less than 2 seconds.      Findings: No bruising or erythema.   Neurological:      General: No focal deficit present.      Mental Status: He is alert and oriented to person, place, and time. Mental status is at baseline.   Psychiatric:         Behavior: Behavior normal.       Procedures           ED Course  ED Course as of 08/25/23 2040   Fri Aug 25, 2023   2039 Reeval, asymptomatic, has atenolol to stilltake tonight at home, ok with plan to f/u pcp [BC]      ED Course User Index  [BC] Alfredo Rowe MD                                           Medical Decision Making  Problems Addressed:  Elevated blood pressure reading: acute illness or injury        Final diagnoses:   Elevated blood  pressure reading       ED Disposition  ED Disposition       ED Disposition   Discharge    Condition   Stable    Comment   --               Ramirez Hernandez MD  [address] [place] [address] [phone]    Schedule an appointment as soon as possible for a visit       [facility] EMERGENCY DEPARTMENT  [facility] [facility] [address] [phone]    As needed, If symptoms worsen         Medication List      No changes were made to your prescriptions during this visit.            Alfredo Rowe MD  08/25/23 2049

## 2023-08-28 RX ORDER — HYDRALAZINE HYDROCHLORIDE 25 MG/1
TABLET, FILM COATED ORAL
Qty: 30 TABLET | Refills: 0 | Status: SHIPPED | OUTPATIENT
Start: 2023-08-28

## 2023-09-26 ENCOUNTER — TELEPHONE (OUTPATIENT)
Dept: INTERNAL MEDICINE | Facility: CLINIC | Age: 63
End: 2023-09-26
Payer: MEDICAID

## 2023-09-26 DIAGNOSIS — U07.1 COVID-19 VIRUS INFECTION: Primary | ICD-10-CM

## 2023-09-26 NOTE — TELEPHONE ENCOUNTER
"Spoke to pt about issues. Pt states that he started having sx on Sunday night and that he went to  and was tested. He stated that he tested positive fairly fast. Pt states that he is absolutely miserable with body aches and pains, as well as coughing intensely and was only sent home with Flonase, and direction for tylenol with aches and pains. Pt asked about antiviral and was advised that \"it would do no good for him anyway\".    Pt states that he had to cancel physical because he cannot fly due to having covid. Pt asks if anything is sent in, that it is sent to [address], [place] [address].   "

## 2023-09-26 NOTE — TELEPHONE ENCOUNTER
Advised pt that provider verbally confirmed that he would call in Paxlovid to the preferred pharmacy, and also advised that pt needed to hold his atorvastatin while taking. Pt states that he is understanding and has no questions at this time. PT to call with any questions.

## 2023-09-26 NOTE — TELEPHONE ENCOUNTER
Caller: Gerard Gonzalez    Relationship to patient: Self    Best call back number: [phone]    Chief complaint: PHYSICAL    Type of visit: PHYSICAL     Requested date: ANYTIME AFTER 10/04/23     If rescheduling, when is the original appointment: 10/03/23     Additional notes:     PATIENT CANCELLED PHYSICAL ON 10/03/23 WITH DR. CARTER DUE TO HIM TRAVELLING    PATIENT WAS ADVISED NEXT OPENING WOULD BE INTO NOVEMBER     PATIENT IS WANTING TO SEE IF HE COULD BE SEEN ANY OTHER TIME IN OCTOBER, HE WILL BE BACK 10/04/23

## 2023-10-26 ENCOUNTER — OFFICE VISIT (OUTPATIENT)
Dept: INTERNAL MEDICINE | Facility: CLINIC | Age: 63
End: 2023-10-26
Payer: MEDICAID

## 2023-10-26 VITALS
DIASTOLIC BLOOD PRESSURE: 72 MMHG | SYSTOLIC BLOOD PRESSURE: 138 MMHG | BODY MASS INDEX: 37.36 KG/M2 | OXYGEN SATURATION: 100 % | TEMPERATURE: 98 F | HEART RATE: 50 BPM | HEIGHT: 76 IN | WEIGHT: 306.8 LBS

## 2023-10-26 DIAGNOSIS — Z01.818 PREOPERATIVE CLEARANCE: Primary | ICD-10-CM

## 2023-10-26 DIAGNOSIS — H60.93 OTITIS EXTERNA OF BOTH EARS, UNSPECIFIED CHRONICITY, UNSPECIFIED TYPE: ICD-10-CM

## 2023-10-26 DIAGNOSIS — I10 ESSENTIAL HYPERTENSION: ICD-10-CM

## 2023-10-26 DIAGNOSIS — R73.03 BORDERLINE TYPE 2 DIABETES MELLITUS: ICD-10-CM

## 2023-10-26 PROCEDURE — 93000 ELECTROCARDIOGRAM COMPLETE: CPT | Performed by: FAMILY MEDICINE

## 2023-10-26 PROCEDURE — 3078F DIAST BP <80 MM HG: CPT | Performed by: FAMILY MEDICINE

## 2023-10-26 PROCEDURE — 3075F SYST BP GE 130 - 139MM HG: CPT | Performed by: FAMILY MEDICINE

## 2023-10-26 PROCEDURE — 99214 OFFICE O/P EST MOD 30 MIN: CPT | Performed by: FAMILY MEDICINE

## 2023-10-26 RX ORDER — CIPROFLOXACIN AND DEXAMETHASONE 3; 1 MG/ML; MG/ML
4 SUSPENSION/ DROPS AURICULAR (OTIC) 2 TIMES DAILY
Qty: 7.5 ML | Refills: 2 | Status: SHIPPED | OUTPATIENT
Start: 2023-10-26

## 2023-10-26 RX ORDER — CIPROFLOXACIN AND DEXAMETHASONE 3; 1 MG/ML; MG/ML
4 SUSPENSION/ DROPS AURICULAR (OTIC) 2 TIMES DAILY
Qty: 7.5 ML | Refills: 2 | Status: SHIPPED | OUTPATIENT
Start: 2023-10-26 | End: 2023-10-26 | Stop reason: SDUPTHER

## 2023-10-26 RX ORDER — BLOOD-GLUCOSE METER
1 KIT MISCELLANEOUS AS NEEDED
Qty: 1 EACH | Refills: 0 | Status: SHIPPED | OUTPATIENT
Start: 2023-10-26

## 2023-10-26 RX ORDER — SEMAGLUTIDE 0.68 MG/ML
0.25 INJECTION, SOLUTION SUBCUTANEOUS WEEKLY
Qty: 3 ML | Refills: 3 | Status: SHIPPED | OUTPATIENT
Start: 2023-10-26

## 2023-11-01 ENCOUNTER — PATIENT MESSAGE (OUTPATIENT)
Dept: INTERNAL MEDICINE | Facility: CLINIC | Age: 63
End: 2023-11-01
Payer: MEDICAID

## 2023-11-02 NOTE — TELEPHONE ENCOUNTER
From: Gerard Gonzalez  To: Ramirez Hernandez  Sent: 11/1/2023 6:13 PM EDT  Subject: dermatologist    I was wondering if you may refer me to a good dermatologist, I have a couple of small cysts on my back and need a yearly checkup. I would like to get in somewhere before the end of the year. My current one has lost 2 providers and can't get me in until the end of January, next year.    thanks in advance,     Gerard Gonzalez

## 2023-11-03 DIAGNOSIS — N40.1 BENIGN PROSTATIC HYPERPLASIA WITH NOCTURIA: ICD-10-CM

## 2023-11-03 DIAGNOSIS — R35.1 BENIGN PROSTATIC HYPERPLASIA WITH NOCTURIA: ICD-10-CM

## 2023-11-06 RX ORDER — TAMSULOSIN HYDROCHLORIDE 0.4 MG/1
1 CAPSULE ORAL DAILY
Qty: 90 CAPSULE | Refills: 0 | Status: SHIPPED | OUTPATIENT
Start: 2023-11-06

## 2023-11-09 ENCOUNTER — TRANSCRIBE ORDERS (OUTPATIENT)
Dept: ADMINISTRATIVE | Facility: HOSPITAL | Age: 63
End: 2023-11-09
Payer: MEDICAID

## 2023-11-09 ENCOUNTER — LAB (OUTPATIENT)
Dept: LAB | Facility: HOSPITAL | Age: 63
End: 2023-11-09
Payer: MEDICAID

## 2023-11-09 DIAGNOSIS — I10 BENIGN HYPERTENSION: ICD-10-CM

## 2023-11-09 DIAGNOSIS — Z01.818 PRE-OP EXAM: ICD-10-CM

## 2023-11-09 DIAGNOSIS — Z01.818 PRE-OP EXAM: Primary | ICD-10-CM

## 2023-11-09 LAB
ANION GAP SERPL CALCULATED.3IONS-SCNC: 9.1 MMOL/L (ref 5–15)
BUN SERPL-MCNC: 15 MG/DL (ref 8–23)
BUN/CREAT SERPL: 18.1 (ref 7–25)
CALCIUM SPEC-SCNC: 9.4 MG/DL (ref 8.6–10.5)
CHLORIDE SERPL-SCNC: 104 MMOL/L (ref 98–107)
CO2 SERPL-SCNC: 27.9 MMOL/L (ref 22–29)
CREAT SERPL-MCNC: 0.83 MG/DL (ref 0.76–1.27)
DEPRECATED RDW RBC AUTO: 41.9 FL (ref 37–54)
EGFRCR SERPLBLD CKD-EPI 2021: 99.6 ML/MIN/1.73
ERYTHROCYTE [DISTWIDTH] IN BLOOD BY AUTOMATED COUNT: 12.8 % (ref 12.3–15.4)
GLUCOSE SERPL-MCNC: 140 MG/DL (ref 65–99)
HCT VFR BLD AUTO: 38.4 % (ref 37.5–51)
HGB BLD-MCNC: 13.2 G/DL (ref 13–17.7)
MCH RBC QN AUTO: 30.6 PG (ref 26.6–33)
MCHC RBC AUTO-ENTMCNC: 34.4 G/DL (ref 31.5–35.7)
MCV RBC AUTO: 88.9 FL (ref 79–97)
PLATELET # BLD AUTO: 149 10*3/MM3 (ref 140–450)
PMV BLD AUTO: 11.1 FL (ref 6–12)
POTASSIUM SERPL-SCNC: 3.9 MMOL/L (ref 3.5–5.2)
RBC # BLD AUTO: 4.32 10*6/MM3 (ref 4.14–5.8)
SODIUM SERPL-SCNC: 141 MMOL/L (ref 136–145)
WBC NRBC COR # BLD: 3.54 10*3/MM3 (ref 3.4–10.8)

## 2023-11-09 PROCEDURE — 80048 BASIC METABOLIC PNL TOTAL CA: CPT

## 2023-11-09 PROCEDURE — 85027 COMPLETE CBC AUTOMATED: CPT

## 2023-11-09 PROCEDURE — 36415 COLL VENOUS BLD VENIPUNCTURE: CPT

## 2023-12-20 ENCOUNTER — PATIENT MESSAGE (OUTPATIENT)
Dept: INTERNAL MEDICINE | Facility: CLINIC | Age: 63
End: 2023-12-20
Payer: MEDICAID

## 2023-12-20 DIAGNOSIS — N50.819 PAIN IN TESTICLE, UNSPECIFIED LATERALITY: Primary | ICD-10-CM

## 2023-12-20 RX ORDER — SULFAMETHOXAZOLE AND TRIMETHOPRIM 800; 160 MG/1; MG/1
1 TABLET ORAL 2 TIMES DAILY
Qty: 28 TABLET | Refills: 0 | Status: SHIPPED | OUTPATIENT
Start: 2023-12-20 | End: 2023-12-20 | Stop reason: SDUPTHER

## 2023-12-20 RX ORDER — SULFAMETHOXAZOLE AND TRIMETHOPRIM 800; 160 MG/1; MG/1
1 TABLET ORAL 2 TIMES DAILY
Qty: 28 TABLET | Refills: 0 | Status: SHIPPED | OUTPATIENT
Start: 2023-12-20 | End: 2024-01-03

## 2023-12-20 NOTE — TELEPHONE ENCOUNTER
From: Gerard Gonzalez  To: Ramirez Hernandez  Sent: 12/20/2023 7:56 AM EST  Subject: sulfamethoxazole-trimethoprim (Bactrim DS) 800-160 MG per table    DR Hernandez  I am having the same issue with my left testicle, that i had a year or so ago. I believe i took the above medication for it. i was wonering if you could send in a presciption for it again, to [facility].  thanks  Gerard Vazquez

## 2024-01-08 NOTE — ED PROVIDER NOTES
EMERGENCY DEPARTMENT ENCOUNTER    CHIEF COMPLAINT  Chief Complaint: elevated blood pressure   History given by: pt  History limited by: none  Room Number: 23/23  PMD: Feroz Lubin MD      HPI:  Pt is a 56 y.o. male who presents complaining of elevated blood pressure for the past day. BP was 183/77 on arrival in the ED and was 122/71 earlier in the week (normally well controlled with medication). He c/o HA and denies CP, SOA, recent medication changes and other complaints at this time. Similar sx three years ago.     Duration:  One day   Onset: gradual   Timing: constant   Location: n/a  Radiation: none  Quality: /77 on arrival to ED  Intensity/Severity: moderate  Progression: unchanged   Associated Symptoms: HA  Aggravating Factors: none  Alleviating Factors: none  Previous Episodes: Hx of htn and similar sx three years ago when he was seen in the ED.  Treatment before arrival: none    PAST MEDICAL HISTORY  Active Ambulatory Problems     Diagnosis Date Noted   • Back pain 01/29/2016   • Gastroesophageal reflux disease 01/29/2016   • Hyperlipidemia 01/29/2016   • Hypertension 01/29/2016   • Hip pain, left 01/29/2016   • Keratosis 01/29/2016   • Umbilical hernia 01/29/2016     Resolved Ambulatory Problems     Diagnosis Date Noted   • No Resolved Ambulatory Problems     Past Medical History   Diagnosis Date   • Benign essential hypertension    • GERD (gastroesophageal reflux disease)    • Headache        PAST SURGICAL HISTORY  Past Surgical History   Procedure Laterality Date   • Hemorrhoidectomy     • Colonoscopy  03/03/2016   • Umbilical hernia repair  03/03/2016       FAMILY HISTORY  Family History   Problem Relation Age of Onset   • Cancer Father      prostate   • Hypertension Other        SOCIAL HISTORY  Social History     Social History   • Marital status: Single     Spouse name: N/A   • Number of children: N/A   • Years of education: N/A     Occupational History   • Not on file.     Social History  Main Topics   • Smoking status: Never Smoker   • Smokeless tobacco: Not on file   • Alcohol use Yes      Comment: occasional   • Drug use: No   • Sexual activity: Not on file     Other Topics Concern   • Not on file     Social History Narrative   • No narrative on file       ALLERGIES  Codeine; Hydrocodone-acetaminophen; and Valsartan    REVIEW OF SYSTEMS  Review of Systems   Constitutional: Negative for activity change, appetite change and fever.   HENT: Negative for congestion and sore throat.    Eyes: Negative.    Respiratory: Negative for cough and shortness of breath.    Cardiovascular: Negative for chest pain and leg swelling.   Gastrointestinal: Negative for abdominal pain, diarrhea and vomiting.   Endocrine: Negative.    Genitourinary: Negative for decreased urine volume and dysuria.   Musculoskeletal: Negative for neck pain.   Skin: Negative for rash and wound.   Allergic/Immunologic: Negative.    Neurological: Positive for headaches. Negative for weakness and numbness.   Hematological: Negative.    Psychiatric/Behavioral: Negative.    All other systems reviewed and are negative.      PHYSICAL EXAM  ED Triage Vitals   Temp Heart Rate Resp BP SpO2   03/14/17 0027 03/14/17 0027 03/14/17 0027 03/14/17 0032 03/14/17 0027   97.6 °F (36.4 °C) 78 18 183/77 92 %      Temp src Heart Rate Source Patient Position BP Location FiO2 (%)   -- 03/14/17 0032 03/14/17 0032 03/14/17 0032 --    Monitor Sitting Left arm        Physical Exam   Constitutional: He is oriented to person, place, and time and well-developed, well-nourished, and in no distress.   HENT:   Head: Normocephalic and atraumatic.   Eyes: EOM are normal. Pupils are equal, round, and reactive to light.   Neck: Normal range of motion. Neck supple.   Cardiovascular: Normal rate, regular rhythm and normal heart sounds.    No murmur heard.  Pulmonary/Chest: Effort normal and breath sounds normal. No respiratory distress.   Abdominal: Soft. There is no tenderness.  There is no rebound and no guarding.   Musculoskeletal: Normal range of motion. He exhibits no edema.   Neurological: He is alert and oriented to person, place, and time. He has normal sensation and normal strength.   Skin: Skin is warm and dry.   Psychiatric: Mood and affect normal.   Nursing note and vitals reviewed.      LAB RESULTS  Lab Results (last 24 hours)     Procedure Component Value Units Date/Time    CBC & Differential [[id number]] Collected:  03/14/17 0305    Specimen:  Blood Updated:  03/14/17 0323    Narrative:       The following orders were created for panel order CBC & Differential.  Procedure                               Abnormality         Status                     ---------                               -----------         ------                     CBC Auto Differential[[id number]]         Abnormal            Final result                 Please view results for these tests on the individual orders.    Comprehensive Metabolic Panel [[id number]]  (Abnormal) Collected:  03/14/17 0305    Specimen:  Blood Updated:  03/14/17 0337     Glucose 143 (H) mg/dL      BUN 13 mg/dL      Creatinine 1.01 mg/dL      Sodium 142 mmol/L      Potassium 3.7 mmol/L      Chloride 101 mmol/L      CO2 24.3 mmol/L      Calcium 9.2 mg/dL      Total Protein 6.8 g/dL      Albumin 4.00 g/dL      ALT (SGPT) 38 U/L      AST (SGOT) 22 U/L      Alkaline Phosphatase 45 U/L      Total Bilirubin 0.5 mg/dL      eGFR Non African Amer 77 mL/min/1.73      Globulin 2.8 gm/dL      A/G Ratio 1.4 g/dL      BUN/Creatinine Ratio 12.9      Anion Gap 16.7 mmol/L     CBC Auto Differential [[id number]]  (Abnormal) Collected:  03/14/17 0305    Specimen:  Blood Updated:  03/14/17 0323     WBC 6.83 10*3/mm3      RBC 4.61 10*6/mm3      Hemoglobin 14.0 g/dL      Hematocrit 39.8 (L) %      MCV 86.3 fL      MCH 30.4 pg      MCHC 35.2 g/dL      RDW 12.6 %      RDW-SD 39.9 fl      MPV 11.2 fL      Platelets 145 10*3/mm3      Neutrophil % 68.2 %      Lymphocyte  % 20.2 %      Monocyte % 9.4 %      Eosinophil % 1.3 %      Basophil % 0.3 %      Immature Grans % 0.6 (H) %      Neutrophils, Absolute 4.66 10*3/mm3      Lymphocytes, Absolute 1.38 10*3/mm3      Monocytes, Absolute 0.64 10*3/mm3      Eosinophils, Absolute 0.09 10*3/mm3      Basophils, Absolute 0.02 10*3/mm3      Immature Grans, Absolute 0.04 (H) 10*3/mm3           I ordered the above labs and reviewed the results      PROCEDURES  Procedures      PROGRESS AND CONSULTS  ED Course     02:27 Ordered blood work for further evaluation. Ordered Hydralazine for elevated BP.     04:00 /89 after hydralazine.     04:27 Rechecked pt. /91. He is resting comfortably and c/o only of headache. Discussed unremarkable lab results and plan to discharge.  Instructed to follow up with PCP for blood pressure recheck. Pt agrees with course of care. Addressed all questions.     Ordered Ibuprofen for HA.       MEDICAL DECISION MAKING  Results were reviewed/discussed with the patient and they were also made aware of online access. Pt also made aware that some labs, such as cultures, will not be resulted during ER visit and follow up with PMD is necessary.     MDM  Number of Diagnoses or Management Options     Amount and/or Complexity of Data Reviewed  Clinical lab tests: ordered and reviewed (WBC - 6.83  Hgb - 14  BUN - 13  CR - 1.01)  Decide to obtain previous medical records or to obtain history from someone other than the patient: yes  Review and summarize past medical records: yes (Last seen in the ED three years ago for hypertension. )    Patient Progress  Patient progress: stable         DIAGNOSIS  Final diagnoses:   Essential hypertension       DISPOSITION  DISCHARGE    Patient discharged in stable condition.    Reviewed implications of results, diagnosis, meds, responsibility to follow up, warning signs and symptoms of possible worsening, potential complications and reasons to return to the ED.    Patient/Family voiced  understanding of above instructions.    Discussed plan for discharge, as there is no emergent indication for admission.  Pt/family is agreeable and understands need for follow up and repeat testing.  Pt is aware that discharge does not mean that nothing is wrong but it indicates no emergency is present that requires admission and they must continue care with follow-up as given below or physician of their choice.     FOLLOW-UP  Feroz Lubin MD  [address]  [place] [id number] [phone]    Call           Medication List      Stop          baclofen 10 MG tablet   Commonly known as:  LIORESAL       cyclobenzaprine 10 MG tablet   Commonly known as:  FLEXERIL       meloxicam 7.5 MG tablet   Commonly known as:  MOBIC       VITAMIN B COMPLEX PO       VITAMIN B-12 PO               Latest Documented Vital Signs:  As of 7:22 AM  BP- 145/85 HR- 55 Temp- 97.6 °F (36.4 °C) O2 sat- 95%    --  Documentation assistance provided by brooklyn Soto for Dr. Daugherty.  Information recorded by the scribe was done at my direction and has been verified and validated by me.        Nereida Soto  03/14/17 0044       Jg Daugherty MD  03/14/17 8684     Negative

## 2024-01-09 ENCOUNTER — TELEPHONE (OUTPATIENT)
Dept: INTERNAL MEDICINE | Facility: CLINIC | Age: 64
End: 2024-01-09
Payer: MEDICAID

## 2024-01-09 NOTE — TELEPHONE ENCOUNTER
If he was tolerating the medication well except for recently, I would stop the medication for a week or 2, let things calm down, and then if feeling better he can start it again when ready.

## 2024-01-09 NOTE — TELEPHONE ENCOUNTER
Caller: Gerard Gonzalez    Relationship: Self    Best call back number: [phone]     Which medication are you concerned about: OZEMPIC    Who prescribed you this medication: DR CARTER    When did you start taking this medication: FOUR WEEKS    What are your concerns: TWO WEEKS AGO, PATIENT STARTED HAVING NAUSEA A LOT AND DOESN'T WANT TO EAT. HE HAS VOMITED TWICE IN THE LAST TWO WEEKS. HE ALSO HAS BEEN HICCUPPING OFTEN. PLEASE ADVISE IF THERE IS ANYTHING HE NEEDS TO DO OR CHANGE WITH THIS MEDICATION.

## 2024-02-10 DIAGNOSIS — N40.1 BENIGN PROSTATIC HYPERPLASIA WITH NOCTURIA: ICD-10-CM

## 2024-02-10 DIAGNOSIS — K21.9 GASTROESOPHAGEAL REFLUX DISEASE WITHOUT ESOPHAGITIS: ICD-10-CM

## 2024-02-10 DIAGNOSIS — I10 ESSENTIAL HYPERTENSION: ICD-10-CM

## 2024-02-10 DIAGNOSIS — E78.2 MIXED HYPERLIPIDEMIA: ICD-10-CM

## 2024-02-10 DIAGNOSIS — R35.1 BENIGN PROSTATIC HYPERPLASIA WITH NOCTURIA: ICD-10-CM

## 2024-02-12 RX ORDER — TAMSULOSIN HYDROCHLORIDE 0.4 MG/1
CAPSULE ORAL
Qty: 30 CAPSULE | Refills: 0 | Status: SHIPPED | OUTPATIENT
Start: 2024-02-12

## 2024-02-12 RX ORDER — LOSARTAN POTASSIUM 100 MG/1
TABLET ORAL
Qty: 90 TABLET | Refills: 0 | Status: SHIPPED | OUTPATIENT
Start: 2024-02-12

## 2024-02-12 RX ORDER — HYDROCHLOROTHIAZIDE 25 MG/1
TABLET ORAL
Qty: 90 TABLET | Refills: 0 | Status: SHIPPED | OUTPATIENT
Start: 2024-02-12

## 2024-02-12 RX ORDER — ATORVASTATIN CALCIUM 40 MG/1
TABLET, FILM COATED ORAL
Qty: 90 TABLET | Refills: 0 | Status: SHIPPED | OUTPATIENT
Start: 2024-02-12

## 2024-02-16 ENCOUNTER — TELEPHONE (OUTPATIENT)
Dept: INTERNAL MEDICINE | Facility: CLINIC | Age: 64
End: 2024-02-16
Payer: MEDICAID

## 2024-02-16 NOTE — TELEPHONE ENCOUNTER
Caller: Gerard Gonzalez    Relationship: Self    Best call back number: [phone]     Who are you requesting to speak with (clinical staff, provider,  specific staff member): CLINICAL STAFF   What was the call regarding: WANTING TO KNOW IF HIM GETTING A CORTISONE SHOT ON MONDAY WILL EFFECT HIS LAB WORK ON TUESDAY? PLEASE CALL AND ADVISE

## 2024-02-20 ENCOUNTER — OFFICE VISIT (OUTPATIENT)
Dept: INTERNAL MEDICINE | Facility: CLINIC | Age: 64
End: 2024-02-20
Payer: MEDICAID

## 2024-02-20 VITALS
TEMPERATURE: 97.8 F | BODY MASS INDEX: 34.91 KG/M2 | WEIGHT: 286.8 LBS | DIASTOLIC BLOOD PRESSURE: 84 MMHG | SYSTOLIC BLOOD PRESSURE: 148 MMHG | HEART RATE: 58 BPM | OXYGEN SATURATION: 99 %

## 2024-02-20 DIAGNOSIS — R73.03 BORDERLINE TYPE 2 DIABETES MELLITUS: ICD-10-CM

## 2024-02-20 DIAGNOSIS — Z00.00 ANNUAL PHYSICAL EXAM: Primary | ICD-10-CM

## 2024-02-20 DIAGNOSIS — I10 ESSENTIAL HYPERTENSION: ICD-10-CM

## 2024-02-20 DIAGNOSIS — N40.1 BENIGN PROSTATIC HYPERPLASIA WITH NOCTURIA: ICD-10-CM

## 2024-02-20 DIAGNOSIS — E66.01 MORBID OBESITY: ICD-10-CM

## 2024-02-20 DIAGNOSIS — N43.3 HYDROCELE, BILATERAL: ICD-10-CM

## 2024-02-20 DIAGNOSIS — E78.2 MIXED HYPERLIPIDEMIA: ICD-10-CM

## 2024-02-20 DIAGNOSIS — K21.9 GASTROESOPHAGEAL REFLUX DISEASE WITHOUT ESOPHAGITIS: ICD-10-CM

## 2024-02-20 DIAGNOSIS — R60.9 PERIPHERAL EDEMA: ICD-10-CM

## 2024-02-20 DIAGNOSIS — M15.9 PRIMARY OSTEOARTHRITIS INVOLVING MULTIPLE JOINTS: ICD-10-CM

## 2024-02-20 DIAGNOSIS — G47.33 OSA ON CPAP: ICD-10-CM

## 2024-02-20 DIAGNOSIS — R35.1 BENIGN PROSTATIC HYPERPLASIA WITH NOCTURIA: ICD-10-CM

## 2024-02-20 RX ORDER — TAMSULOSIN HYDROCHLORIDE 0.4 MG/1
1 CAPSULE ORAL DAILY
Qty: 90 CAPSULE | Refills: 3 | Status: SHIPPED | OUTPATIENT
Start: 2024-02-20

## 2024-02-20 RX ORDER — HYDROCHLOROTHIAZIDE 25 MG/1
25 TABLET ORAL DAILY
Qty: 90 TABLET | Refills: 3 | Status: SHIPPED | OUTPATIENT
Start: 2024-02-20

## 2024-02-20 RX ORDER — ATORVASTATIN CALCIUM 40 MG/1
40 TABLET, FILM COATED ORAL DAILY
Qty: 90 TABLET | Refills: 3 | Status: SHIPPED | OUTPATIENT
Start: 2024-02-20 | End: 2024-02-26 | Stop reason: SDUPTHER

## 2024-02-20 RX ORDER — ATENOLOL 50 MG/1
50 TABLET ORAL DAILY
Qty: 90 TABLET | Refills: 3 | Status: SHIPPED | OUTPATIENT
Start: 2024-02-20

## 2024-02-20 RX ORDER — LOSARTAN POTASSIUM 100 MG/1
TABLET ORAL
Qty: 90 TABLET | Refills: 3 | Status: SHIPPED | OUTPATIENT
Start: 2024-02-20

## 2024-02-20 NOTE — PROGRESS NOTES
Date of Encounter: 2024  Patient: Gerard Gonazlez,  1960    Subjective   History of Presenting Illness  Chief complaint: Annual physical     No acute concerns.    Hypertension, not taking home measurements recently, tolerating current medication regimen.  A little bit more stressed over the past few weeks related to work.      Hyperlipidemia tolerating statin well with no side effects.    Type 2 diabetes, he initially had some nausea and vomiting around the time of Ozempic initiation but the symptoms have resolved and may have been related to Bactrim.  He is lost 30 pounds with this medication. FBS numbers now 90s.     GERD with no elizabeth symptoms although occasionally gets hiccups with Ozempic.    BPH with bilateral hydroceles, does not appreciate first urology's consultation and requests alternative referral for second opinion.  Symptoms stable on tamsulosin.    Osteoarthritis of multiple joints including the shoulder, back, elbow, follows with orthopedics and physical therapy routinely.    JAKOB adherent to CPAP.     Single, not currently sexually active.  Never a smoker.  Very rare alcohol use.  Exercises twice per week at physical therapy.  Adhering to diabetic diet.  Colon cancer screening 2016 with 10-year interval. 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  Does not have a living will.  Discussed COVID-19, RSV, Shingrix vaccines which he declines today.     The following portions of the patient's history were reviewed and updated as appropriate: allergies, current medications, past family history, past medical history, past social history, past surgical history and problem list.    Patient Active Problem List   Diagnosis    Gastroesophageal reflux disease without esophagitis    Mixed hyperlipidemia    Essential hypertension    Keratosis    Borderline type 2 diabetes mellitus    Left shoulder pain    Morbid obesity    Peripheral edema     Degeneration of lumbar intervertebral disc    History of compression fracture of spine    Lumbosacral spondylosis without myelopathy    Chronic midline low back pain with right-sided sciatica    Thoracic spondylosis without myelopathy    JAKOB on CPAP    Chronic pain of right ankle    Benign prostatic hyperplasia with nocturia    Diverticulosis    Osteoarthritis, multiple sites    Hydrocele, bilateral     Past Medical History:   Diagnosis Date    Allergic     Codine    Benign essential hypertension     Bruit of left carotid artery 09/13/2018    Diabetes mellitus     diet controlled    GERD (gastroesophageal reflux disease)     Headache     Hip pain, left 01/29/2016    Lymphadenopathy of head and neck 07/29/2021    Myofascial pain 10/26/2020    Pain of both elbows 08/28/2017    Sleep apnea     wears cpap    Umbilical hernia 01/29/2016     Past Surgical History:   Procedure Laterality Date    COLONOSCOPY  03/03/2016    HEMORRHOIDECTOMY      TUMOR REMOVAL      from groin, benign    UMBILICAL HERNIA REPAIR  03/03/2016     Family History   Problem Relation Age of Onset    Cancer Father         prostate    Hypertension Other     Hypertension Mother     Cancer Paternal Uncle     Cancer Paternal Grandfather        Current Outpatient Medications:     amLODIPine (NORVASC) 10 MG tablet, TAKE 1 TABLET DAILY, Disp: 90 tablet, Rfl: 3    APPLE CIDER VINEGAR PO, Take  by mouth., Disp: , Rfl:     aspirin 81 MG EC tablet, Take 1 tablet by mouth Daily., Disp: , Rfl:     atenolol (TENORMIN) 50 MG tablet, Take 1 tablet by mouth Daily., Disp: 90 tablet, Rfl: 3    atorvastatin (LIPITOR) 40 MG tablet, Take 1 tablet by mouth Daily., Disp: 90 tablet, Rfl: 3    B Complex Vitamins (vitamin b complex) capsule capsule, Take  by mouth Daily., Disp: , Rfl:     Cinnamon 500 MG capsule, Take 2 capsules by mouth Daily., Disp: , Rfl:     Coenzyme Q10 (CO Q 10 PO), Take by mouth., Disp: , Rfl:     Cyanocobalamin (B-12 PO), Take  by mouth., Disp: , Rfl:      glucose monitor monitoring kit, Use 1 each As Needed (Use to check blood sugar once daily). Please dispense covered glucometer with set of lancets and strips at a reasonable quantity #100 3rf. Thanks - SW, Disp: 1 each, Rfl: 0    hydrALAZINE (APRESOLINE) 25 MG tablet, Take 1 tab PO TID as needed for blood pressure >160 systolic or >100 diastolic, Disp: 30 tablet, Rfl: 0    hydroCHLOROthiazide 25 MG tablet, Take 1 tablet by mouth Daily., Disp: 90 tablet, Rfl: 3    KRILL OIL PO, Take  by mouth., Disp: , Rfl:     losartan (COZAAR) 100 MG tablet, Take 1 tablet daily for blood pressure, Disp: 90 tablet, Rfl: 3    melatonin 3 MG tablet, Take 1 tablet by mouth Every Night., Disp: 90 tablet, Rfl: 3    metFORMIN (GLUCOPHAGE) 500 MG tablet, Take 2 tablets by mouth 2 (Two) Times a Day With Meals., Disp: 360 tablet, Rfl: 3    Scopolamine (Transderm-Scop, 1.5 MG,) 1 MG/3DAYS patch, Place 1 patch on the skin as directed by provider Every 72 (Seventy-Two) Hours., Disp: 4 patch, Rfl: 3    Semaglutide,0.25 or 0.5MG/DOS, (Ozempic, 0.25 or 0.5 MG/DOSE,) 2 MG/3ML solution pen-injector, Inject 0.25 mg under the skin into the appropriate area as directed 1 (One) Time Per Week. If tolerating well, contact Dr to increase dose after 1 month, Disp: 3 mL, Rfl: 3    tamsulosin (FLOMAX) 0.4 MG capsule 24 hr capsule, Take 1 capsule by mouth Daily., Disp: 90 capsule, Rfl: 3  Allergies   Allergen Reactions    Codeine Other (See Comments)     tremors    Valsartan Angioedema    Bactrim [Sulfamethoxazole-Trimethoprim] Nausea And Vomiting    Hydrocodone-Acetaminophen Other (See Comments)     Made me real hot     Social History     Tobacco Use    Smoking status: Never    Smokeless tobacco: Never   Substance Use Topics    Alcohol use: Not Currently     Comment: occasional    Drug use: No          Objective   Physical Exam  Vitals:    02/20/24 0746   BP: 148/84   Pulse: 58   Temp: 97.8 °F (36.6 °C)   SpO2: 99%   Weight: 130 kg (286 lb 12.8 oz)      Body mass index is 34.91 kg/m².    Constitutional: NAD.  Eyes: EOMI. PERRLA. Normal conjunctiva.  Ear, nose, mouth, throat: No tonsillar exudates or erythema.   Normal nasal mucosa. Normal external ear canals and TMs bilaterally.  Cardiovascular: RRR. No murmurs. No LE edema b/l. Radial pulses 2+ bilaterally.  Pulmonary: CTA b/l. Good effort.  Integumentary: No rashes or wounds on face or upper extremities.  Lymphatic: No anterior cervical lymphadenopathy.  Endocrine: No thyromegaly or palpable thyroid nodules.  Psychiatric: Normal affect. Normal thought content.       Assessment & Plan   Assessment and Plan  Very pleasant 63-year-old male with hypertension, hyperlipidemia, type 2 diabetes without complication, JAKOB on CPAP, polyarthritis, BPH, GERD, BMI greater than 35, who presents for the following:      Diagnoses and all orders for this visit:    1. Annual physical exam (Primary): We discussed preventative care including age and patient-appropriate immunizations and cancer screening. We also discussed the importance of exercise and a healthy diet. This is their annual preventative exam.    2. Essential hypertension: Stable, less than JNC 8 goals, but suspect we will need less blood pressure medication as he continues to lose weight  -     hydroCHLOROthiazide 25 MG tablet; Take 1 tablet by mouth Daily.  Dispense: 90 tablet; Refill: 3  -     atenolol (TENORMIN) 50 MG tablet; Take 1 tablet by mouth Daily.  Dispense: 90 tablet; Refill: 3  -     Urinalysis With Microscopic - Urine, Clean Catch    3. Mixed hyperlipidemia: Tolerating statin  -     atorvastatin (LIPITOR) 40 MG tablet; Take 1 tablet by mouth Daily.  Dispense: 90 tablet; Refill: 3  -     CBC & Differential  -     Comprehensive Metabolic Panel  -     Lipid Panel  -     Thyroid Panel With TSH    4. Borderline type 2 diabetes mellitus: Suspect improvement with Ozempic we will continue to increase dose as tolerated  -     Hemoglobin A1c    5.  Gastroesophageal reflux disease without esophagitis: No current symptoms  -     losartan (COZAAR) 100 MG tablet; Take 1 tablet daily for blood pressure  Dispense: 90 tablet; Refill: 3    6. Benign prostatic hyperplasia with nocturia: Continue tamsulosin, will update PSA and refer to [facility] urology  -     tamsulosin (FLOMAX) 0.4 MG capsule 24 hr capsule; Take 1 capsule by mouth Daily.  Dispense: 90 capsule; Refill: 3  -     Ambulatory Referral to Urology  -     PSA DIAGNOSTIC  7. Hydrocele, bilateral  -     Ambulatory Referral to Urology    8. Primary osteoarthritis involving multiple joints: Continue to follow with orthopedics and physical therapy    9. JAKOB on CPAP: Adherent    10. Morbid obesity    11. Peripheral edema: No current edema    Follow-up in 6 months for type 2 diabetes    Ramirez Hernandez MD  Family Medicine  O: [phone]    Disclaimer: Parts of this note were dictated by speech recognition. Minor errors in transcription may be present. Please call if questions.

## 2024-02-20 NOTE — PROGRESS NOTES
Date of Encounter: 2024  Patient: Gerard Gonzalez,  1960    Subjective   History of Presenting Illness  Chief complaint: ***    ***     Review of Systems:  Negative for fever, congestion, chest pain upon exertion, shortness of breath, vision changes, vomiting, dysuria, lymphadenopathy, muscle weakness, numbness, mood changes, rashes.    The following portions of the patient's history were reviewed and updated as appropriate: allergies, current medications, past family history, past medical history, past social history, past surgical history and problem list.    Patient Active Problem List   Diagnosis    Gastroesophageal reflux disease without esophagitis    Mixed hyperlipidemia    Essential hypertension    Keratosis    Borderline type 2 diabetes mellitus    Left shoulder pain    Morbid obesity    Peripheral edema    Degeneration of lumbar intervertebral disc    History of compression fracture of spine    Lumbosacral spondylosis without myelopathy    Chronic midline low back pain with right-sided sciatica    Thoracic spondylosis without myelopathy    JAKOB on CPAP    Chronic pain of right ankle    Benign prostatic hyperplasia with nocturia    Diverticulosis     Past Medical History:   Diagnosis Date    Benign essential hypertension     Bruit of left carotid artery 2018    Diabetes mellitus     diet controlled    GERD (gastroesophageal reflux disease)     Headache     Hip pain, left 2016    Lymphadenopathy of head and neck 2021    Myofascial pain 10/26/2020    Pain of both elbows 2017    Sleep apnea     wears cpap    Umbilical hernia 2016     Past Surgical History:   Procedure Laterality Date    COLONOSCOPY  2016    HEMORRHOIDECTOMY      TUMOR REMOVAL      from groin, benign    UMBILICAL HERNIA REPAIR  2016     Family History   Problem Relation Age of Onset    Cancer Father         prostate    Hypertension Other     Hypertension Mother     Cancer Paternal Uncle      Cancer Paternal Grandfather        Current Outpatient Medications:     amLODIPine (NORVASC) 10 MG tablet, TAKE 1 TABLET DAILY, Disp: 90 tablet, Rfl: 3    APPLE CIDER VINEGAR PO, Take  by mouth., Disp: , Rfl:     aspirin 81 MG EC tablet, Take 1 tablet by mouth Daily., Disp: , Rfl:     atenolol (TENORMIN) 50 MG tablet, Take 1 tablet by mouth Daily., Disp: 90 tablet, Rfl: 3    atorvastatin (LIPITOR) 40 MG tablet, To be filled by Provider, Disp: 90 tablet, Rfl: 0    B Complex Vitamins (vitamin b complex) capsule capsule, Take  by mouth Daily., Disp: , Rfl:     Cinnamon 500 MG capsule, Take 2 capsules by mouth Daily., Disp: , Rfl:     ciprofloxacin-dexAMETHasone (Ciprodex) 0.3-0.1 % otic suspension, Administer 4 drops into both ears 2 (Two) Times a Day., Disp: 7.5 mL, Rfl: 2    Coenzyme Q10 (CO Q 10 PO), Take by mouth., Disp: , Rfl:     Cyanocobalamin (B-12 PO), Take  by mouth., Disp: , Rfl:     glucose monitor monitoring kit, Use 1 each As Needed (Use to check blood sugar once daily). Please dispense covered glucometer with set of lancets and strips at a reasonable quantity #100 3rf. Thanks - SW, Disp: 1 each, Rfl: 0    hydrALAZINE (APRESOLINE) 25 MG tablet, Take 1 tab PO TID as needed for blood pressure >160 systolic or >100 diastolic, Disp: 30 tablet, Rfl: 0    hydroCHLOROthiazide 25 MG tablet, To be filled by Provider, Disp: 90 tablet, Rfl: 0    KRILL OIL PO, Take  by mouth., Disp: , Rfl:     losartan (COZAAR) 100 MG tablet, To be filled by Provider, Disp: 90 tablet, Rfl: 0    melatonin 3 MG tablet, Take 1 tablet by mouth Every Night., Disp: 90 tablet, Rfl: 3    metFORMIN (GLUCOPHAGE) 500 MG tablet, Take 2 tablets by mouth 2 (Two) Times a Day With Meals., Disp: 360 tablet, Rfl: 3    Scopolamine (Transderm-Scop, 1.5 MG,) 1 MG/3DAYS patch, Place 1 patch on the skin as directed by provider Every 72 (Seventy-Two) Hours., Disp: 4 patch, Rfl: 3    Semaglutide,0.25 or 0.5MG/DOS, (Ozempic, 0.25 or 0.5 MG/DOSE,) 2 MG/3ML  solution pen-injector, Inject 0.25 mg under the skin into the appropriate area as directed 1 (One) Time Per Week. If tolerating well, contact Dr to increase dose after 1 month, Disp: 3 mL, Rfl: 3    tamsulosin (FLOMAX) 0.4 MG capsule 24 hr capsule, To be filled by Provider, Disp: 30 capsule, Rfl: 0  Allergies   Allergen Reactions    Codeine Other (See Comments)     tremors    Valsartan Angioedema    Hydrocodone-Acetaminophen Other (See Comments)     Made me real hot     Social History     Tobacco Use    Smoking status: Never    Smokeless tobacco: Never   Substance Use Topics    Alcohol use: Not Currently     Comment: occasional    Drug use: No          Objective   Physical Exam  There were no vitals filed for this visit.  There is no height or weight on file to calculate BMI.    Constitutional: NAD.  Eyes: EOMI. PERRLA. Normal conjunctiva.  Ear, nose, mouth, throat: No tonsillar exudates or erythema.   Normal nasal mucosa. Normal external ear canals and TMs bilaterally.  Cardiovascular: RRR. No murmurs. No LE edema b/l. Radial pulses 2+ bilaterally.  Pulmonary: CTA b/l. Good effort.  Integumentary: No rashes or wounds on face or upper extremities.  Lymphatic: No anterior cervical lymphadenopathy.  Endocrine: No thyromegaly or palpable thyroid nodules.  Psychiatric: Normal affect. Normal thought content.  ***       Assessment & Plan   Assessment and Plan  ***    There are no diagnoses linked to this encounter.           ***    Ramirez Hernandez MD  Family Medicine  O: [phone]    Disclaimer: Parts of this note were dictated by speech recognition. Minor errors in transcription may be present. Please call if questions.

## 2024-02-21 LAB
ALBUMIN SERPL-MCNC: 4.7 G/DL (ref 3.9–4.9)
ALBUMIN/GLOB SERPL: 2.1 {RATIO} (ref 1.2–2.2)
ALP SERPL-CCNC: 57 IU/L (ref 44–121)
ALT SERPL-CCNC: 36 IU/L (ref 0–44)
APPEARANCE UR: CLEAR
AST SERPL-CCNC: 21 IU/L (ref 0–40)
BACTERIA #/AREA URNS HPF: NORMAL /[HPF]
BASOPHILS # BLD AUTO: 0 X10E3/UL (ref 0–0.2)
BASOPHILS NFR BLD AUTO: 1 %
BILIRUB SERPL-MCNC: 1.1 MG/DL (ref 0–1.2)
BILIRUB UR QL STRIP: NEGATIVE
BUN SERPL-MCNC: 15 MG/DL (ref 8–27)
BUN/CREAT SERPL: 17 (ref 10–24)
CALCIUM SERPL-MCNC: 8.9 MG/DL (ref 8.6–10.2)
CASTS URNS QL MICRO: NORMAL /LPF
CHLORIDE SERPL-SCNC: 103 MMOL/L (ref 96–106)
CHOLEST SERPL-MCNC: 89 MG/DL (ref 100–199)
CO2 SERPL-SCNC: 24 MMOL/L (ref 20–29)
COLOR UR: YELLOW
CREAT SERPL-MCNC: 0.87 MG/DL (ref 0.76–1.27)
EGFRCR SERPLBLD CKD-EPI 2021: 98 ML/MIN/1.73
EOSINOPHIL # BLD AUTO: 0 X10E3/UL (ref 0–0.4)
EOSINOPHIL NFR BLD AUTO: 1 %
EPI CELLS #/AREA URNS HPF: NORMAL /HPF (ref 0–10)
ERYTHROCYTE [DISTWIDTH] IN BLOOD BY AUTOMATED COUNT: 12.8 % (ref 11.6–15.4)
FT4I SERPL CALC-MCNC: 1.7 (ref 1.2–4.9)
GLOBULIN SER CALC-MCNC: 2.2 G/DL (ref 1.5–4.5)
GLUCOSE SERPL-MCNC: 106 MG/DL (ref 70–99)
GLUCOSE UR QL STRIP: NEGATIVE
HBA1C MFR BLD: 5.8 % (ref 4.8–5.6)
HCT VFR BLD AUTO: 41.1 % (ref 37.5–51)
HDLC SERPL-MCNC: 35 MG/DL
HGB BLD-MCNC: 13.9 G/DL (ref 13–17.7)
HGB UR QL STRIP: NEGATIVE
IMM GRANULOCYTES # BLD AUTO: 0 X10E3/UL (ref 0–0.1)
IMM GRANULOCYTES NFR BLD AUTO: 0 %
KETONES UR QL STRIP: NEGATIVE
LDLC SERPL CALC-MCNC: 34 MG/DL (ref 0–99)
LEUKOCYTE ESTERASE UR QL STRIP: NEGATIVE
LYMPHOCYTES # BLD AUTO: 1.3 X10E3/UL (ref 0.7–3.1)
LYMPHOCYTES NFR BLD AUTO: 22 %
MCH RBC QN AUTO: 30.2 PG (ref 26.6–33)
MCHC RBC AUTO-ENTMCNC: 33.8 G/DL (ref 31.5–35.7)
MCV RBC AUTO: 89 FL (ref 79–97)
MICRO URNS: NORMAL
MICRO URNS: NORMAL
MONOCYTES # BLD AUTO: 0.5 X10E3/UL (ref 0.1–0.9)
MONOCYTES NFR BLD AUTO: 8 %
NEUTROPHILS # BLD AUTO: 3.8 X10E3/UL (ref 1.4–7)
NEUTROPHILS NFR BLD AUTO: 68 %
NITRITE UR QL STRIP: NEGATIVE
PH UR STRIP: 6.5 [PH] (ref 5–7.5)
PLATELET # BLD AUTO: 193 X10E3/UL (ref 150–450)
POTASSIUM SERPL-SCNC: 4 MMOL/L (ref 3.5–5.2)
PROT SERPL-MCNC: 6.9 G/DL (ref 6–8.5)
PROT UR QL STRIP: NEGATIVE
PSA SERPL-MCNC: 2.3 NG/ML (ref 0–4)
RBC # BLD AUTO: 4.6 X10E6/UL (ref 4.14–5.8)
RBC #/AREA URNS HPF: NORMAL /HPF (ref 0–2)
SODIUM SERPL-SCNC: 141 MMOL/L (ref 134–144)
SP GR UR STRIP: 1.01 (ref 1–1.03)
T3RU NFR SERPL: 29 % (ref 24–39)
T4 SERPL-MCNC: 5.8 UG/DL (ref 4.5–12)
TRIGL SERPL-MCNC: 104 MG/DL (ref 0–149)
TSH SERPL DL<=0.005 MIU/L-ACNC: 0.87 UIU/ML (ref 0.45–4.5)
UROBILINOGEN UR STRIP-MCNC: 0.2 MG/DL (ref 0.2–1)
VLDLC SERPL CALC-MCNC: 20 MG/DL (ref 5–40)
WBC # BLD AUTO: 5.6 X10E3/UL (ref 3.4–10.8)
WBC #/AREA URNS HPF: NORMAL /HPF (ref 0–5)

## 2024-02-26 ENCOUNTER — TELEPHONE (OUTPATIENT)
Dept: INTERNAL MEDICINE | Facility: CLINIC | Age: 64
End: 2024-02-26
Payer: MEDICAID

## 2024-02-26 DIAGNOSIS — E78.2 MIXED HYPERLIPIDEMIA: ICD-10-CM

## 2024-02-26 RX ORDER — ATORVASTATIN CALCIUM 20 MG/1
20 TABLET, FILM COATED ORAL DAILY
Qty: 90 TABLET | Refills: 3 | Status: SHIPPED | OUTPATIENT
Start: 2024-02-26

## 2024-02-26 NOTE — PROGRESS NOTES
Your blood sugar has improved significantly and you are in the prediabetic range.  Keep up the good work.    Cholesterol is too low.  We need to reduce your atorvastatin from 40 mg to 20 mg.  Break your current pill in half but I will also send in a new prescription. This is related to your weight loss which is excellent.    Everything else looks great

## 2024-03-15 DIAGNOSIS — R73.03 BORDERLINE TYPE 2 DIABETES MELLITUS: ICD-10-CM

## 2024-03-15 RX ORDER — SEMAGLUTIDE 0.68 MG/ML
0.25 INJECTION, SOLUTION SUBCUTANEOUS WEEKLY
Qty: 3 ML | Refills: 3 | Status: SHIPPED | OUTPATIENT
Start: 2024-03-15

## 2024-03-15 NOTE — TELEPHONE ENCOUNTER
Caller: Gerard Gonzalez    Relationship: Self    Best call back number: [phone]     Requested Prescriptions:   Requested Prescriptions     Pending Prescriptions Disp Refills    Semaglutide,0.25 or 0.5MG/DOS, (Ozempic, 0.25 or 0.5 MG/DOSE,) 2 MG/3ML solution pen-injector 3 mL 3     Sig: Inject 0.25 mg under the skin into the appropriate area as directed 1 (One) Time Per Week. If tolerating well, contact Dr to increase dose after 1 month        Pharmacy where request should be sent: [facility], [address] [phone] [facility] [phone] FX     Last office visit with prescribing clinician: 2/20/2024   Last telemedicine visit with prescribing clinician: Visit date not found   Next office visit with prescribing clinician: 3/21/2024     Additional details provided by patient: PHARMACY REQUESTS NEW PRESCRIPTION FOR 90 DAY SUPPLY    PATIENT HAS HAD DIFFICULTY GETTING MEDICATION FROM [facility] DUE TO DELAYS. HE WILL BE OUT 3/18/2024    Does the patient have less than a 3 day supply:  [x] Yes  [] No    Debbie Freedman Rep   03/15/24 10:58 EDT

## 2024-03-21 ENCOUNTER — OFFICE VISIT (OUTPATIENT)
Dept: INTERNAL MEDICINE | Facility: CLINIC | Age: 64
End: 2024-03-21
Payer: MEDICAID

## 2024-03-21 VITALS
TEMPERATURE: 97.5 F | OXYGEN SATURATION: 99 % | BODY MASS INDEX: 33.07 KG/M2 | DIASTOLIC BLOOD PRESSURE: 78 MMHG | SYSTOLIC BLOOD PRESSURE: 138 MMHG | HEART RATE: 64 BPM | HEIGHT: 76 IN | WEIGHT: 271.6 LBS

## 2024-03-21 DIAGNOSIS — M16.12 PRIMARY OSTEOARTHRITIS OF LEFT HIP: Primary | ICD-10-CM

## 2024-03-21 DIAGNOSIS — K40.90 LEFT INGUINAL HERNIA: ICD-10-CM

## 2024-03-21 PROCEDURE — 3078F DIAST BP <80 MM HG: CPT | Performed by: FAMILY MEDICINE

## 2024-03-21 PROCEDURE — 3075F SYST BP GE 130 - 139MM HG: CPT | Performed by: FAMILY MEDICINE

## 2024-03-21 PROCEDURE — 99213 OFFICE O/P EST LOW 20 MIN: CPT | Performed by: FAMILY MEDICINE

## 2024-03-21 NOTE — PROGRESS NOTES
Date of Encounter: 2024  Patient: Gerard Gonzalez,  1960    Subjective   History of Presenting Illness  Chief complaint: Left groin pain    Patient has had a catching type left groin pain particularly with certain movements and when getting in and out of a car.  He has been seeing orthopedics who ultimately performed an MRI which showed moderate left hip osteoarthritis, chronic cam impingement, multifragmented superior lateral acetabulum and a chronically torn labrum.  He was noted to have a small fat-containing left inguinal hernia.  He does not appreciate any bulge in his left groin.  His left groin pain is uncomfortable but he does not take any medications for it.  He continues to lose weight with GLP-1's which she tolerates very well.  He is scheduled for an intra-articular corticosteroid shot of the left hip.    The following portions of the patient's history were reviewed and updated as appropriate: allergies, current medications, past family history, past medical history, past social history, past surgical history and problem list.    Patient Active Problem List   Diagnosis    Gastroesophageal reflux disease without esophagitis    Mixed hyperlipidemia    Essential hypertension    Keratosis    Borderline type 2 diabetes mellitus    Left shoulder pain    Morbid obesity    Peripheral edema    Degeneration of lumbar intervertebral disc    History of compression fracture of spine    Lumbosacral spondylosis without myelopathy    Chronic midline low back pain with right-sided sciatica    Thoracic spondylosis without myelopathy    JAKOB on CPAP    Chronic pain of right ankle    Benign prostatic hyperplasia with nocturia    Diverticulosis    Osteoarthritis, multiple sites    Hydrocele, bilateral    Fat containing left inguinal hernia    Osteoarthritis of left hip     Past Medical History:   Diagnosis Date    Allergic     Codine    Benign essential hypertension     Bruit of left carotid artery 2018     Diabetes mellitus     diet controlled    GERD (gastroesophageal reflux disease)     Headache     Hip pain, left 01/29/2016    Lymphadenopathy of head and neck 07/29/2021    Myofascial pain 10/26/2020    Pain of both elbows 08/28/2017    Sleep apnea     wears cpap    Umbilical hernia 01/29/2016     Past Surgical History:   Procedure Laterality Date    COLONOSCOPY  03/03/2016    HEMORRHOIDECTOMY      TUMOR REMOVAL      from groin, benign    UMBILICAL HERNIA REPAIR  03/03/2016     Family History   Problem Relation Age of Onset    Cancer Father         prostate    Hypertension Other     Hypertension Mother     Cancer Paternal Uncle     Cancer Paternal Grandfather        Current Outpatient Medications:     amLODIPine (NORVASC) 10 MG tablet, TAKE 1 TABLET DAILY, Disp: 90 tablet, Rfl: 3    APPLE CIDER VINEGAR PO, Take  by mouth., Disp: , Rfl:     aspirin 81 MG EC tablet, Take 1 tablet by mouth Daily., Disp: , Rfl:     atenolol (TENORMIN) 50 MG tablet, Take 1 tablet by mouth Daily., Disp: 90 tablet, Rfl: 3    atorvastatin (LIPITOR) 20 MG tablet, Take 1 tablet by mouth Daily., Disp: 90 tablet, Rfl: 3    B Complex Vitamins (vitamin b complex) capsule capsule, Take  by mouth Daily., Disp: , Rfl:     Cinnamon 500 MG capsule, Take 2 capsules by mouth Daily., Disp: , Rfl:     Coenzyme Q10 (CO Q 10 PO), Take by mouth., Disp: , Rfl:     Cyanocobalamin (B-12 PO), Take  by mouth., Disp: , Rfl:     glucose monitor monitoring kit, Use 1 each As Needed (Use to check blood sugar once daily). Please dispense covered glucometer with set of lancets and strips at a reasonable quantity #100 3rf. Thanks - SW, Disp: 1 each, Rfl: 0    hydrALAZINE (APRESOLINE) 25 MG tablet, Take 1 tab PO TID as needed for blood pressure >160 systolic or >100 diastolic, Disp: 30 tablet, Rfl: 0    hydroCHLOROthiazide 25 MG tablet, Take 1 tablet by mouth Daily., Disp: 90 tablet, Rfl: 3    KRILL OIL PO, Take  by mouth., Disp: , Rfl:     losartan (COZAAR) 100 MG  "tablet, Take 1 tablet daily for blood pressure, Disp: 90 tablet, Rfl: 3    melatonin 3 MG tablet, Take 1 tablet by mouth Every Night., Disp: 90 tablet, Rfl: 3    metFORMIN (GLUCOPHAGE) 500 MG tablet, Take 2 tablets by mouth 2 (Two) Times a Day With Meals., Disp: 360 tablet, Rfl: 3    Scopolamine (Transderm-Scop, 1.5 MG,) 1 MG/3DAYS patch, Place 1 patch on the skin as directed by provider Every 72 (Seventy-Two) Hours., Disp: 4 patch, Rfl: 3    Semaglutide,0.25 or 0.5MG/DOS, (Ozempic, 0.25 or 0.5 MG/DOSE,) 2 MG/3ML solution pen-injector, Inject 0.25 mg under the skin into the appropriate area as directed 1 (One) Time Per Week. If tolerating well, contact Dr to increase dose after 1 month, Disp: 3 mL, Rfl: 3    tamsulosin (FLOMAX) 0.4 MG capsule 24 hr capsule, Take 1 capsule by mouth Daily., Disp: 90 capsule, Rfl: 3  Allergies   Allergen Reactions    Codeine Other (See Comments)     tremors    Valsartan Angioedema    Bactrim [Sulfamethoxazole-Trimethoprim] Nausea And Vomiting    Hydrocodone-Acetaminophen Other (See Comments)     Made me real hot     Social History     Tobacco Use    Smoking status: Never    Smokeless tobacco: Never   Substance Use Topics    Alcohol use: Not Currently     Comment: occasional    Drug use: No          Objective   Physical Exam  Vitals:    03/21/24 0813   BP: 138/78   BP Location: Left arm   Patient Position: Sitting   Cuff Size: Large Adult   Pulse: 64   Temp: 97.5 °F (36.4 °C)   TempSrc: Infrared   SpO2: 99%   Weight: 123 kg (271 lb 9.6 oz)   Height: 193 cm (76\")     Body mass index is 33.06 kg/m².    Constitutional: NAD.  Psychiatric: Normal affect. Normal thought content.  Musculoskeletal: Normal examination of the right hip.  Left hip shows reduced range of motion in passive flexion with reproduction of his symptoms, reduced range of motion and INNA testing, no discomfort or reduced range of motion with FADIR testing  Genitourinary: No palpable left inguinal hernia on exam.   "   Assessment & Plan   Assessment and Plan  Very pleasant 63-year-old male with hypertension, hyperlipidemia, type 2 diabetes without complication, JAKOB on CPAP, polyarthritis, BPH, GERD, BMI greater than 35, who presents for the following:       Diagnoses and all orders for this visit:    1. Primary osteoarthritis of left hip (Primary)    2. Left inguinal hernia    Reviewed his MRI report.  This in context of examination suggest that his symptoms are due to left hip osteoarthritis and impingement with labral tear.  His left inguinal hernia is too small to be palpable and is not the cause of his symptoms.  I agree with trial of corticosteroid injection, discussed reasons to consider further intervention like joint replacement.  However his weight loss will continue to be favorable for this type of osteoarthritis and may delay need for anything for some time.  Discussed reasons to return for further evaluation.    Ramirez Hernandez MD  Family Medicine  O: [phone]    Disclaimer: Parts of this note were dictated by speech recognition. Minor errors in transcription may be present. Please call if questions.

## 2024-05-08 ENCOUNTER — TELEPHONE (OUTPATIENT)
Dept: INTERNAL MEDICINE | Facility: CLINIC | Age: 64
End: 2024-05-08
Payer: MEDICAID

## 2024-05-08 DIAGNOSIS — R73.03 BORDERLINE TYPE 2 DIABETES MELLITUS: ICD-10-CM

## 2024-05-09 RX ORDER — SEMAGLUTIDE 0.68 MG/ML
0.5 INJECTION, SOLUTION SUBCUTANEOUS WEEKLY
Qty: 9 ML | Refills: 3 | Status: SHIPPED | OUTPATIENT
Start: 2024-05-09

## 2024-05-14 DIAGNOSIS — I10 ESSENTIAL HYPERTENSION: ICD-10-CM

## 2024-05-15 RX ORDER — AMLODIPINE BESYLATE 10 MG/1
10 TABLET ORAL DAILY
Qty: 90 TABLET | Refills: 1 | Status: SHIPPED | OUTPATIENT
Start: 2024-05-15

## 2024-07-17 ENCOUNTER — TELEPHONE (OUTPATIENT)
Dept: INTERNAL MEDICINE | Facility: CLINIC | Age: 64
End: 2024-07-17
Payer: MEDICAID

## 2024-07-17 DIAGNOSIS — U07.1 COVID-19 VIRUS INFECTION: Primary | ICD-10-CM

## 2024-07-17 RX ORDER — BENZONATATE 100 MG/1
100 CAPSULE ORAL 3 TIMES DAILY PRN
Qty: 30 CAPSULE | Refills: 1 | Status: SHIPPED | OUTPATIENT
Start: 2024-07-17

## 2024-07-17 NOTE — TELEPHONE ENCOUNTER
Caller: Gerard Gonzalez    Relationship: Self    Best call back number: [phone]     What medication are you requesting: PAXLOVID OR OTHER COVID MEDICINE.     PATIENT ALSO IS ASKING FOR TESSALON PEARLS (COUGH MEDICINE)    What are your current symptoms: COUGH, LOW GRADE FEVER, SORE THROAT, CONGESTION    How long have you been experiencing symptoms: PATIENT HAS HAD SYMPTOMS FOR 2 DAYS BUT ONLY TESTED POSITIVE THE NIGHT OF 7/16/24.    Have you had these symptoms before:    [x] Yes  [] No    Have you been treated for these symptoms before:   [x] Yes  [] No    If a prescription is needed, what is your preferred pharmacy and phone number:  [facility] [id number] - [place], [place] - [address] [facility] [address] [facility]     PLEASE CALL TO CONFIRM WITH PATIENT AND LET HIM KNOW IF THERE WILL BE MEDICATION SENT    Additional notes:  PATIENT IS REQUESTING SOMETHING OTHER THAN PAXLOVID IF POSSIBLE, BUT IF NOT PAXLOVID WILL BE FINE

## 2024-07-17 NOTE — TELEPHONE ENCOUNTER
"Sent in paxlovid    Reduces viral replication, improved symptoms    Can cause nausea, brief change in taste    ~<5% chance of \"rebound\" covid when medication is completed    If having other side effects please stop the medication    Needs to hold his statin while taking this, and be cautious about his flomax causing him dizziness (he can hold it if he wants but I don't want his urinary flow to get too limited)    "

## 2024-07-18 NOTE — TELEPHONE ENCOUNTER
Patient advised as instructed   Dapsone Counseling: I discussed with the patient the risks of dapsone including but not limited to hemolytic anemia, agranulocytosis, rashes, methemoglobinemia, kidney failure, peripheral neuropathy, headaches, GI upset, and liver toxicity.  Patients who start dapsone require monitoring including baseline LFTs and weekly CBCs for the first month, then every month thereafter.  The patient verbalized understanding of the proper use and possible adverse effects of dapsone.  All of the patient's questions and concerns were addressed.

## 2024-08-20 ENCOUNTER — OFFICE VISIT (OUTPATIENT)
Dept: INTERNAL MEDICINE | Facility: CLINIC | Age: 64
End: 2024-08-20
Payer: MEDICAID

## 2024-08-20 VITALS
HEIGHT: 76 IN | OXYGEN SATURATION: 99 % | SYSTOLIC BLOOD PRESSURE: 138 MMHG | TEMPERATURE: 97.7 F | DIASTOLIC BLOOD PRESSURE: 74 MMHG | HEART RATE: 50 BPM | BODY MASS INDEX: 34.49 KG/M2 | WEIGHT: 283.2 LBS

## 2024-08-20 DIAGNOSIS — G47.33 OSA ON CPAP: ICD-10-CM

## 2024-08-20 DIAGNOSIS — G89.29 CHRONIC MIDLINE LOW BACK PAIN WITH RIGHT-SIDED SCIATICA: ICD-10-CM

## 2024-08-20 DIAGNOSIS — R73.03 BORDERLINE TYPE 2 DIABETES MELLITUS: Primary | ICD-10-CM

## 2024-08-20 DIAGNOSIS — M25.561 CHRONIC PAIN OF RIGHT KNEE: ICD-10-CM

## 2024-08-20 DIAGNOSIS — G89.29 CHRONIC PAIN OF RIGHT KNEE: ICD-10-CM

## 2024-08-20 DIAGNOSIS — L72.3 SEBACEOUS CYST: ICD-10-CM

## 2024-08-20 DIAGNOSIS — M54.41 CHRONIC MIDLINE LOW BACK PAIN WITH RIGHT-SIDED SCIATICA: ICD-10-CM

## 2024-08-20 DIAGNOSIS — Z12.83 SCREENING FOR MALIGNANT NEOPLASM OF SKIN: ICD-10-CM

## 2024-08-20 PROCEDURE — 3075F SYST BP GE 130 - 139MM HG: CPT | Performed by: FAMILY MEDICINE

## 2024-08-20 PROCEDURE — 3078F DIAST BP <80 MM HG: CPT | Performed by: FAMILY MEDICINE

## 2024-08-20 PROCEDURE — 99214 OFFICE O/P EST MOD 30 MIN: CPT | Performed by: FAMILY MEDICINE

## 2024-08-20 PROCEDURE — 1126F AMNT PAIN NOTED NONE PRSNT: CPT | Performed by: FAMILY MEDICINE

## 2024-08-20 RX ORDER — SEMAGLUTIDE 1.34 MG/ML
1 INJECTION, SOLUTION SUBCUTANEOUS WEEKLY
Qty: 9 ML | Refills: 3 | Status: SHIPPED | OUTPATIENT
Start: 2024-08-20 | End: 2024-08-26 | Stop reason: SDUPTHER

## 2024-08-20 RX ORDER — CYCLOBENZAPRINE HYDROCHLORIDE 7.5 MG/1
7.5 TABLET, FILM COATED ORAL 3 TIMES DAILY PRN
Qty: 90 TABLET | Refills: 3 | Status: SHIPPED | OUTPATIENT
Start: 2024-08-20

## 2024-08-20 RX ORDER — PREDNISONE 20 MG/1
20 TABLET ORAL DAILY
Qty: 5 TABLET | Refills: 0 | Status: SHIPPED | OUTPATIENT
Start: 2024-08-20 | End: 2024-08-25

## 2024-08-20 RX ORDER — MELOXICAM 15 MG/1
15 TABLET ORAL DAILY PRN
Qty: 90 TABLET | Refills: 3 | Status: SHIPPED | OUTPATIENT
Start: 2024-08-20

## 2024-08-20 NOTE — PROGRESS NOTES
Date of Encounter: 2024  Patient: Gerard Gonzalez,  1960    Subjective   History of Presenting Illness  Chief complaint: Follow-up type 2 diabetes    Type 2 diabetes, feels he has plateaued with Ozempic and would like to increase the dose.  Feels it may have given him a weird taste since starting it, but would like to continue.  No other gastrointestinal side effects.  He last checked his FBS this morning and it was 119.    Chronic right knee pain following with orthopedics.  Diagnosed as osteoarthritis.  Not getting any benefit from corticosteroid injection.  Limping, uncomfortable at night, limiting his activities.  Seeing [facility] orth[facility]ics soon for consultation.  Considering gel injections.  Did not find benefit from a brace.    Asking for refills on meloxicam and cyclobenzaprine for as needed use for his polyarthritis.  He is aware of the NSAID risks and tries to minimize his use.    2 sebaceous cysts, one on his right upper arm that is a little bit more tender over the past few days.  It does not drain historically.    JAKOB adherent to CPAP, would like to get with the sleep specialist for routine monitoring and adjustments    Needs referral to dermatology for skin check    The following portions of the patient's history were reviewed and updated as appropriate: allergies, current medications, past family history, past medical history, past social history, past surgical history and problem list.    Patient Active Problem List   Diagnosis    Gastroesophageal reflux disease without esophagitis    Mixed hyperlipidemia    Essential hypertension    Keratosis    Borderline type 2 diabetes mellitus    Left shoulder pain    Morbid obesity    Peripheral edema    Degeneration of lumbar intervertebral disc    History of compression fracture of spine    Lumbosacral spondylosis without myelopathy    Chronic midline low back pain with right-sided sciatica    Thoracic spondylosis without myelopathy    JAKOB on CPAP     Chronic pain of right ankle    Benign prostatic hyperplasia with nocturia    Diverticulosis    Osteoarthritis, multiple sites    Hydrocele, bilateral    Fat containing left inguinal hernia    Osteoarthritis of left hip    Chronic pain of right knee     Past Medical History:   Diagnosis Date    Allergic     Codine    Benign essential hypertension     Bruit of left carotid artery 09/13/2018    Diabetes mellitus     diet controlled    GERD (gastroesophageal reflux disease)     Headache     Hip pain, left 01/29/2016    Lymphadenopathy of head and neck 07/29/2021    Myofascial pain 10/26/2020    Pain of both elbows 08/28/2017    Sleep apnea     wears cpap    Umbilical hernia 01/29/2016     Past Surgical History:   Procedure Laterality Date    COLONOSCOPY  03/03/2016    HEMORRHOIDECTOMY      TUMOR REMOVAL      from groin, benign    UMBILICAL HERNIA REPAIR  03/03/2016     Family History   Problem Relation Age of Onset    Cancer Father         prostate    Hypertension Other     Hypertension Mother     Cancer Paternal Uncle     Cancer Paternal Grandfather        Current Outpatient Medications:     amLODIPine (NORVASC) 10 MG tablet, Take 1 tablet by mouth Daily., Disp: 90 tablet, Rfl: 1    aspirin 81 MG EC tablet, Take 1 tablet by mouth Daily., Disp: , Rfl:     atenolol (TENORMIN) 50 MG tablet, Take 1 tablet by mouth Daily., Disp: 90 tablet, Rfl: 3    atorvastatin (LIPITOR) 20 MG tablet, Take 1 tablet by mouth Daily., Disp: 90 tablet, Rfl: 3    Cinnamon 500 MG capsule, Take 2 capsules by mouth Daily., Disp: , Rfl:     Coenzyme Q10 (CO Q 10 PO), Take by mouth., Disp: , Rfl:     glucose monitor monitoring kit, Use 1 each As Needed (Use to check blood sugar once daily). Please dispense covered glucometer with set of lancets and strips at a reasonable quantity #100 3rf. Thanks - SW, Disp: 1 each, Rfl: 0    hydrALAZINE (APRESOLINE) 25 MG tablet, Take 1 tab PO TID as needed for blood pressure >160 systolic or >100 diastolic,  Disp: 30 tablet, Rfl: 0    hydroCHLOROthiazide 25 MG tablet, Take 1 tablet by mouth Daily., Disp: 90 tablet, Rfl: 3    KRILL OIL PO, Take  by mouth., Disp: , Rfl:     losartan (COZAAR) 100 MG tablet, Take 1 tablet daily for blood pressure, Disp: 90 tablet, Rfl: 3    melatonin 3 MG tablet, Take 1 tablet by mouth Every Night., Disp: 90 tablet, Rfl: 3    metFORMIN (GLUCOPHAGE) 500 MG tablet, Take 2 tablets by mouth 2 (Two) Times a Day With Meals., Disp: 360 tablet, Rfl: 3    Scopolamine (Transderm-Scop, 1.5 MG,) 1 MG/3DAYS patch, Place 1 patch on the skin as directed by provider Every 72 (Seventy-Two) Hours., Disp: 4 patch, Rfl: 3    tamsulosin (FLOMAX) 0.4 MG capsule 24 hr capsule, Take 1 capsule by mouth Daily., Disp: 90 capsule, Rfl: 3    APPLE CIDER VINEGAR PO, Take  by mouth. (Patient not taking: Reported on 8/20/2024), Disp: , Rfl:     B Complex Vitamins (vitamin b complex) capsule capsule, Take  by mouth Daily. (Patient not taking: Reported on 8/20/2024), Disp: , Rfl:     Cyanocobalamin (B-12 PO), Take  by mouth. (Patient not taking: Reported on 8/20/2024), Disp: , Rfl:     cyclobenzaprine (FEXMID) 7.5 MG tablet, Take 1 tablet by mouth 3 (Three) Times a Day As Needed for Muscle Spasms., Disp: 90 tablet, Rfl: 3    meloxicam (MOBIC) 15 MG tablet, Take 1 tablet by mouth Daily As Needed for Moderate Pain., Disp: 90 tablet, Rfl: 3    predniSONE (DELTASONE) 20 MG tablet, Take 1 tablet by mouth Daily for 5 days., Disp: 5 tablet, Rfl: 0    Semaglutide, 1 MG/DOSE, (Ozempic, 1 MG/DOSE,) 4 MG/3ML solution pen-injector, Inject 1 mg under the skin into the appropriate area as directed 1 (One) Time Per Week., Disp: 9 mL, Rfl: 3  Allergies   Allergen Reactions    Codeine Other (See Comments)     tremors    Valsartan Angioedema    Bactrim [Sulfamethoxazole-Trimethoprim] Nausea And Vomiting    Hydrocodone-Acetaminophen Other (See Comments)     Made me real hot     Social History     Tobacco Use    Smoking status: Never     "Smokeless tobacco: Never   Vaping Use    Vaping status: Never Used   Substance Use Topics    Alcohol use: Not Currently     Comment: occasional    Drug use: No          Objective   Physical Exam  Vitals:    08/20/24 0801   BP: 138/74   BP Location: Left arm   Patient Position: Sitting   Cuff Size: Large Adult   Pulse: 50   Temp: 97.7 °F (36.5 °C)   TempSrc: Infrared   SpO2: 99%   Weight: 128 kg (283 lb 3.2 oz)   Height: 193 cm (76\")     Body mass index is 34.47 kg/m².    Constitutional: NAD.  Integumentary: 3.5 cm sebaceous cyst, nonerythematous, mildly tender, on the right lateral shoulder  Psychiatric: Normal affect. Normal thought content.  Musculoskeletal: Reduced range of motion in active right knee extension by about 20 degrees compared to the left.  Palpably reduced joint space medially and laterally.  No tenderness to palpation or appreciable effusion.     Assessment & Plan   Assessment and Plan  Very pleasant 64-year-old male with hypertension, hyperlipidemia, type 2 diabetes without complication, JAKOB on CPAP, polyarthritis, BPH, GERD, BMI greater than 35, who presents for the following:       Diagnoses and all orders for this visit:    1. Borderline type 2 diabetes mellitus (Primary): Continue to Ozempic as tolerated.   -     Semaglutide, 1 MG/DOSE, (Ozempic, 1 MG/DOSE,) 4 MG/3ML solution pen-injector; Inject 1 mg under the skin into the appropriate area as directed 1 (One) Time Per Week.  Dispense: 9 mL; Refill: 3    2. Chronic pain of right knee: He will probably need a knee replacement soon based on his exam and symptoms.  Okay to try the gel shots, meloxicam as needed for now until orthopedic consult  -     meloxicam (MOBIC) 15 MG tablet; Take 1 tablet by mouth Daily As Needed for Moderate Pain.  Dispense: 90 tablet; Refill: 3    3. Chronic midline low back pain with right-sided sciatica  -     cyclobenzaprine (FEXMID) 7.5 MG tablet; Take 1 tablet by mouth 3 (Three) Times a Day As Needed for Muscle " Spasms.  Dispense: 90 tablet; Refill: 3    4. Sebaceous cyst: Prednisone to calm down the cyst and referral to general surgery for consideration of outpatient removal  -     Ambulatory Referral to General Surgery  -     predniSONE (DELTASONE) 20 MG tablet; Take 1 tablet by mouth Daily for 5 days.  Dispense: 5 tablet; Refill: 0    5. JAKOB on CPAP  -     Ambulatory Referral to Sleep Medicine    6. Screening for malignant neoplasm of skin  -     Ambulatory Referral to Dermatology    Ramirez Hernandez MD  [facility]  O: [phone]    Disclaimer: Parts of this note were dictated by speech recognition. Minor errors in transcription may be present. Please call if questions.

## 2024-08-21 ENCOUNTER — TELEPHONE (OUTPATIENT)
Dept: INTERNAL MEDICINE | Facility: CLINIC | Age: 64
End: 2024-08-21
Payer: MEDICAID

## 2024-08-21 NOTE — TELEPHONE ENCOUNTER
Lvm for pt to let him know that he needs to call the number on the back of his [employer] ins. They will have a list of ALL specialties that are taking patient medicaid at this time.    No other dermatology places are accepting new medicaid patients.    Hub to read. Please and thanks

## 2024-08-22 ENCOUNTER — PATIENT MESSAGE (OUTPATIENT)
Dept: INTERNAL MEDICINE | Facility: CLINIC | Age: 64
End: 2024-08-22
Payer: MEDICAID

## 2024-08-22 DIAGNOSIS — R73.03 BORDERLINE TYPE 2 DIABETES MELLITUS: ICD-10-CM

## 2024-08-26 RX ORDER — SEMAGLUTIDE 1.34 MG/ML
1 INJECTION, SOLUTION SUBCUTANEOUS WEEKLY
Qty: 9 ML | Refills: 3 | Status: SHIPPED | OUTPATIENT
Start: 2024-08-26

## 2024-08-27 NOTE — TELEPHONE ENCOUNTER
From: Gerard Gonzalez  To: Ramirez Hernandez  Sent: 8/22/2024 4:52 PM EDT  Subject: Ozempic 1.0    Dr. Hernandez the Ozempic prescription went to [facility], It should have been a 90 day prescription sent to [facility], any help with this would be appreciated.    thanks  KG

## 2024-09-10 ENCOUNTER — OFFICE VISIT (OUTPATIENT)
Dept: SURGERY | Facility: CLINIC | Age: 64
End: 2024-09-10
Payer: MEDICAID

## 2024-09-10 VITALS
DIASTOLIC BLOOD PRESSURE: 90 MMHG | SYSTOLIC BLOOD PRESSURE: 160 MMHG | WEIGHT: 289 LBS | BODY MASS INDEX: 35.19 KG/M2 | HEIGHT: 76 IN

## 2024-09-10 DIAGNOSIS — L72.3 SEBACEOUS CYST: Primary | ICD-10-CM

## 2024-09-10 PROCEDURE — 3077F SYST BP >= 140 MM HG: CPT | Performed by: SURGERY

## 2024-09-10 PROCEDURE — 3080F DIAST BP >= 90 MM HG: CPT | Performed by: SURGERY

## 2024-09-10 PROCEDURE — 99202 OFFICE O/P NEW SF 15 MIN: CPT | Performed by: SURGERY

## 2024-09-10 PROCEDURE — 1160F RVW MEDS BY RX/DR IN RCRD: CPT | Performed by: SURGERY

## 2024-09-10 PROCEDURE — 1159F MED LIST DOCD IN RCRD: CPT | Performed by: SURGERY

## 2024-09-10 NOTE — PROGRESS NOTES
Subjective   Gerard Gonzalez is a 64 y.o. male who presents to the office in surgical consultation from Ramirez Hernandez MD for 2 separate sebaceous cysts.    History of present illness  The patient has 2 subcutaneous cysts that have recently been symptomatic.  There is one located on his posterior right shoulder that became enlarged and erythematous.  It was very tender.  He then developed some crusty areas and mild drainage from it and is stuck to his sheets which caused it to open up and drain thick bloody type material.  It has significantly improved since that time.  He has a second lesion in the mid back just to the left of midline that is slowly getting larger.  It is tender when pressure is applied.  It has never drained.    Review of Systems   Constitutional:  Negative for chills and fever.   Skin:  Negative for rash and wound.     Past Medical History:   Diagnosis Date    Allergic     Codine    Benign essential hypertension     Bruit of left carotid artery 09/13/2018    Diabetes mellitus     diet controlled    GERD (gastroesophageal reflux disease)     Headache     Hip pain, left 01/29/2016    Lymphadenopathy of head and neck 07/29/2021    Myofascial pain 10/26/2020    Pain of both elbows 08/28/2017    Sleep apnea     wears cpap    Umbilical hernia 01/29/2016     Past Surgical History:   Procedure Laterality Date    COLONOSCOPY  03/03/2016    HEMORRHOIDECTOMY      TUMOR REMOVAL      from groin, benign    UMBILICAL HERNIA REPAIR  03/03/2016     Family History   Problem Relation Age of Onset    Cancer Father         prostate    Hypertension Other     Hypertension Mother     Cancer Paternal Uncle     Cancer Paternal Grandfather      Social History     Socioeconomic History    Marital status: Single   Tobacco Use    Smoking status: Never    Smokeless tobacco: Never   Vaping Use    Vaping status: Never Used   Substance and Sexual Activity    Alcohol use: Not Currently     Comment: occasional    Drug use: No     Sexual activity: Not Currently       Objective   Physical Exam  Constitutional:       Appearance: He is not ill-appearing or toxic-appearing.   Skin:     Comments: There is a 1 cm sebaceous cyst of the right posterior shoulder that has minimal overlying erythema but no palpable fluctuance.  It has well-defined margins.  There is a 2 cm sebaceous cyst of the left mid back that has no palpable fluctuance.  It is not erythematous.  It has well-defined margins.   Neurological:      Mental Status: He is alert.   Psychiatric:         Behavior: Behavior is cooperative.              Assessment & Plan     1.  Sebaceous cysts: The patient has 2 separate sebaceous cyst, 1 of which was recently infected and spontaneously drained.  They are both that risk of becoming infected in the future.  This was discussed with him.  He was offered an excision of the sebaceous cyst in the office.  He states that today he is not a good day for that procedure because he has obligations later today.  He was scheduled to return to the office for an excision of the sebaceous cysts.  The patient understands the indications, alternatives, risks, and benefits of the procedure and wishes to proceed.

## 2024-09-10 NOTE — LETTER
September 10, 2024     Ramirez Hernandez MD     Patient: Gerard Gonzalez   YOB: 1960   Date of Visit: 9/10/2024     Dear Ramirez Hernandez MD:       Thank you for referring Gerard Gonzalez to me for evaluation. Below are the relevant portions of my assessment and plan of care.    If you have questions, please do not hesitate to call me. I look forward to following Gerard along with you.         Sincerely,        Nicolas Silva Jr., MD        CC:   No Recipients    Nicolas Silva Jr., MD  09/10/24 0920  Sign when Signing Visit  Subjective  Gerard Gonzalez is a 64 y.o. male who presents to the office in surgical consultation from Ramirez Hernandez MD for 2 separate sebaceous cysts.    History of present illness  The patient has 2 subcutaneous cysts that have recently been symptomatic.  There is one located on his posterior right shoulder that became enlarged and erythematous.  It was very tender.  He then developed some crusty areas and mild drainage from it and is stuck to his sheets which caused it to open up and drain thick bloody type material.  It has significantly improved since that time.  He has a second lesion in the mid back just to the left of midline that is slowly getting larger.  It is tender when pressure is applied.  It has never drained.    Review of Systems   Constitutional:  Negative for chills and fever.   Skin:  Negative for rash and wound.     Past Medical History:   Diagnosis Date   • Allergic     Codine   • Benign essential hypertension    • Bruit of left carotid artery 09/13/2018   • Diabetes mellitus     diet controlled   • GERD (gastroesophageal reflux disease)    • Headache    • Hip pain, left 01/29/2016   • Lymphadenopathy of head and neck 07/29/2021   • Myofascial pain 10/26/2020   • Pain of both elbows 08/28/2017   • Sleep apnea     wears cpap   • Umbilical hernia 01/29/2016     Past Surgical History:   Procedure Laterality Date   • COLONOSCOPY  03/03/2016   • HEMORRHOIDECTOMY     •  TUMOR REMOVAL      from groin, benign   • UMBILICAL HERNIA REPAIR  03/03/2016     Family History   Problem Relation Age of Onset   • Cancer Father         prostate   • Hypertension Other    • Hypertension Mother    • Cancer Paternal Uncle    • Cancer Paternal Grandfather      Social History     Socioeconomic History   • Marital status: Single   Tobacco Use   • Smoking status: Never   • Smokeless tobacco: Never   Vaping Use   • Vaping status: Never Used   Substance and Sexual Activity   • Alcohol use: Not Currently     Comment: occasional   • Drug use: No   • Sexual activity: Not Currently       Objective  Physical Exam  Constitutional:       Appearance: He is not ill-appearing or toxic-appearing.   Skin:     Comments: There is a 1 cm sebaceous cyst of the right posterior shoulder that has minimal overlying erythema but no palpable fluctuance.  It has well-defined margins.  There is a 2 cm sebaceous cyst of the left mid back that has no palpable fluctuance.  It is not erythematous.  It has well-defined margins.   Neurological:      Mental Status: He is alert.   Psychiatric:         Behavior: Behavior is cooperative.              Assessment & Plan    1.  Sebaceous cysts: The patient has 2 separate sebaceous cyst, 1 of which was recently infected and spontaneously drained.  They are both that risk of becoming infected in the future.  This was discussed with him.  He was offered an excision of the sebaceous cyst in the office.  He states that today he is not a good day for that procedure because he has obligations later today.  He was scheduled to return to the office for an excision of the sebaceous cysts.  The patient understands the indications, alternatives, risks, and benefits of the procedure and wishes to proceed.

## 2024-09-11 ENCOUNTER — OFFICE VISIT (OUTPATIENT)
Facility: HOSPITAL | Age: 64
End: 2024-09-11
Payer: MEDICAID

## 2024-09-11 VITALS — HEIGHT: 76 IN | BODY MASS INDEX: 34.9 KG/M2 | HEART RATE: 64 BPM | OXYGEN SATURATION: 96 % | WEIGHT: 286.6 LBS

## 2024-09-11 DIAGNOSIS — E66.09 CLASS 1 OBESITY DUE TO EXCESS CALORIES WITHOUT SERIOUS COMORBIDITY WITH BODY MASS INDEX (BMI) OF 34.0 TO 34.9 IN ADULT: ICD-10-CM

## 2024-09-11 DIAGNOSIS — I1A.0 RESISTANT HYPERTENSION: ICD-10-CM

## 2024-09-11 DIAGNOSIS — G47.33 OSA ON CPAP: Primary | ICD-10-CM

## 2024-09-11 PROBLEM — E66.811 CLASS 1 OBESITY DUE TO EXCESS CALORIES WITHOUT SERIOUS COMORBIDITY WITH BODY MASS INDEX (BMI) OF 34.0 TO 34.9 IN ADULT: Status: ACTIVE | Noted: 2024-09-11

## 2024-09-11 PROCEDURE — 99204 OFFICE O/P NEW MOD 45 MIN: CPT | Performed by: PSYCHIATRY & NEUROLOGY

## 2024-09-11 PROCEDURE — 1160F RVW MEDS BY RX/DR IN RCRD: CPT | Performed by: PSYCHIATRY & NEUROLOGY

## 2024-09-11 PROCEDURE — 1159F MED LIST DOCD IN RCRD: CPT | Performed by: PSYCHIATRY & NEUROLOGY

## 2024-09-11 NOTE — PROGRESS NOTES
Addendum: After the patient left we did get a copy of the diagnostic study done at [facility] on 6/8/2018.  The AHI was 81.1.  PLM index was 39.2 and PLM with arousal was 5.2      Reason for Consultation: Sleep apnea on CPAP        Patient Care Team:  Ramirez Hernandez MD as PCP - General (Family Medicine)  Josee Lubin MD, MPH as Consulting Physician (Sleep Medicine)      History of present illness:    Thank you for asking me to see your patient.  The patient is a 64 y.o. male was diagnosed with sleep apnea in 2018 at [facility].  He has been unable to get up consistent sleep medicine provider and so has been forced to get his supplies online rather than from local DME.  He comes in today to establish care.  He has a replacement Navneet machine DreamStation 2 auto CPAP.  We have a compliance download from 8/12/2024 through 9/10/2024.  This reveals average use of 8 hours and 18 minutes and 100% of the last 30 days.  His residual AHI is 0.5 with average time in large leak being 15 minutes and 25 seconds.  He uses a fullface mask and he feels as though the mask leaks around the bridge of his nose.  He would like to try different style of mask.  He gets his equipment from [facility] but he is willing to get his equipment other places.  Explained to him that without his diagnostic study he would be able to get anywhere else but we will endeavor to obtain that study.  His pressure is set to 14 cm and fixed.    Habitual bedtime is 10 PM and gets up 6 AM and this is 7 days a week he estimates he slept 7 to 8 hours and takes 5 to 10 minutes to wake up and he feels fine.  He does not endorse any parasomnias any estimates he lost 35 pounds in the past 5 years.  He works as a  his 0 alcohol and no tobacco and he has 1 cup of tea per day.    [place]: 3    Data Reviewed: Reviewed his chart and sleep questionnaire      PMH:  Past Medical History:   Diagnosis Date     Allergic     Codine    Benign essential hypertension     Bruit of left carotid artery 09/13/2018    Diabetes mellitus     diet controlled    GERD (gastroesophageal reflux disease)     Headache     Hip pain, left 01/29/2016    Lymphadenopathy of head and neck 07/29/2021    Myofascial pain 10/26/2020    Pain of both elbows 08/28/2017    Sleep apnea     wears cpap    Umbilical hernia 01/29/2016          Allergies:  Codeine, Valsartan, Bactrim [sulfamethoxazole-trimethoprim], and Hydrocodone-acetaminophen     Medication Review:   Current Outpatient Medications on File Prior to Visit   Medication Sig Dispense Refill    amLODIPine (NORVASC) 10 MG tablet Take 1 tablet by mouth Daily. 90 tablet 1    aspirin 81 MG EC tablet Take 1 tablet by mouth Daily.      atenolol (TENORMIN) 50 MG tablet Take 1 tablet by mouth Daily. 90 tablet 3    atorvastatin (LIPITOR) 20 MG tablet Take 1 tablet by mouth Daily. 90 tablet 3    Cinnamon 500 MG capsule Take 2 capsules by mouth Daily.      Coenzyme Q10 (CO Q 10 PO) Take by mouth.      cyclobenzaprine (FEXMID) 7.5 MG tablet Take 1 tablet by mouth 3 (Three) Times a Day As Needed for Muscle Spasms. (Patient not taking: Reported on 9/10/2024) 90 tablet 3    glucose monitor monitoring kit Use 1 each As Needed (Use to check blood sugar once daily). Please dispense covered glucometer with set of lancets and strips at a reasonable quantity #100 3rf. Thanks - SW 1 each 0    hydrALAZINE (APRESOLINE) 25 MG tablet Take 1 tab PO TID as needed for blood pressure >160 systolic or >100 diastolic 30 tablet 0    hydroCHLOROthiazide 25 MG tablet Take 1 tablet by mouth Daily. 90 tablet 3    KRILL OIL PO Take  by mouth.      losartan (COZAAR) 100 MG tablet Take 1 tablet daily for blood pressure 90 tablet 3    melatonin 3 MG tablet Take 1 tablet by mouth Every Night. 90 tablet 3    meloxicam (MOBIC) 15 MG tablet Take 1 tablet by mouth Daily As Needed for Moderate Pain. 90 tablet 3    metFORMIN (GLUCOPHAGE)  "500 MG tablet Take 2 tablets by mouth 2 (Two) Times a Day With Meals. 360 tablet 3    Scopolamine (Transderm-Scop, 1.5 MG,) 1 MG/3DAYS patch Place 1 patch on the skin as directed by provider Every 72 (Seventy-Two) Hours. 4 patch 3    Semaglutide, 1 MG/DOSE, (Ozempic, 1 MG/DOSE,) 4 MG/3ML solution pen-injector Inject 1 mg under the skin into the appropriate area as directed 1 (One) Time Per Week. 9 mL 3    tamsulosin (FLOMAX) 0.4 MG capsule 24 hr capsule Take 1 capsule by mouth Daily. 90 capsule 3     No current facility-administered medications on file prior to visit.         Vital Signs:    Vitals:    09/11/24 0810   Pulse: 64   SpO2: 96%   Weight: 130 kg (286 lb 9.6 oz)   Height: 193 cm (76\")        Body mass index is 34.89 kg/m².     .BMIFOLLOWUP         Physical Exam:    Constitutional:  Well developed 64 y.o. male that appears in no apparent distress.  Awake & oriented times 3.  Normal mood with normal recent and remote memory and normal judgement.  Eyes:  Conjunctivae normal.  Oropharynx: Moist mucous membranes without exudate and Mallampati 4 with tongue ridging  Neck: Trachea midline  Respiratory: Effort is not labored  Cardiovascular: Radial pulse regular  Musculoskeletal: Gait appears normal, no digital clubbing evident, no pre-tibial edema        Impression:   Encounter Diagnoses   Name Primary?    JAKOB on CPAP Yes    Resistant hypertension     Class 1 obesity due to excess calories without serious comorbidity with body mass index (BMI) of 34.0 to 34.9 in adult      Patient's BMI is Body mass index is 34.89 kg/m².    Patient is presently well treated and really just need new supplies and a compliance download.  He wants to try an F20 AirTouch mask and I think that is very reasonable.    Plan:    I would have new supplies and then we will let him choose the mask that he likes and I will see him back in 3 months for compliance download.    The patient should practice good sleep hygiene measures.      Weight " loss might be beneficial in this patient who has a Body mass index is 34.89 kg/m².      Pathophysiology of JAKOB described to the patient.  Cardiovascular complications of untreated JAKOB also reviewed.      The patient was cautioned about the dangers of drowsy driving.    Bandar Lubin MD  Sleep Medicine  09/11/24  09:11 EDT

## 2024-09-26 ENCOUNTER — PROCEDURE VISIT (OUTPATIENT)
Dept: SURGERY | Facility: CLINIC | Age: 64
End: 2024-09-26
Payer: MEDICAID

## 2024-09-26 VITALS — WEIGHT: 286 LBS | BODY MASS INDEX: 34.83 KG/M2 | HEIGHT: 76 IN

## 2024-09-26 DIAGNOSIS — L72.3 SEBACEOUS CYST: Primary | ICD-10-CM

## 2024-09-26 PROCEDURE — 88304 TISSUE EXAM BY PATHOLOGIST: CPT | Performed by: SURGERY

## 2024-09-26 RX ORDER — CEPHALEXIN 500 MG/1
500 CAPSULE ORAL 3 TIMES DAILY
Qty: 15 CAPSULE | Refills: 0 | Status: SHIPPED | OUTPATIENT
Start: 2024-09-26 | End: 2024-10-01

## 2024-09-28 LAB
CYTO UR: NORMAL
LAB AP CASE REPORT: NORMAL
PATH REPORT.FINAL DX SPEC: NORMAL
PATH REPORT.GROSS SPEC: NORMAL

## 2024-10-15 ENCOUNTER — OFFICE VISIT (OUTPATIENT)
Dept: SURGERY | Facility: CLINIC | Age: 64
End: 2024-10-15
Payer: COMMERCIAL

## 2024-10-15 VITALS
SYSTOLIC BLOOD PRESSURE: 140 MMHG | HEIGHT: 76 IN | DIASTOLIC BLOOD PRESSURE: 88 MMHG | BODY MASS INDEX: 34.02 KG/M2 | WEIGHT: 279.4 LBS

## 2024-10-15 DIAGNOSIS — Z48.89 POSTOPERATIVE VISIT: Primary | ICD-10-CM

## 2024-10-15 PROCEDURE — 99024 POSTOP FOLLOW-UP VISIT: CPT | Performed by: SURGERY

## 2024-10-15 NOTE — PROGRESS NOTES
Subjective   Gerard Gonzalez is a 64 y.o. male who returns to the office after undergoing an excision of 2 separate sebaceous cysts on 9/26/2024.     History of present illness  The patient is recovering well with no significant postop symptoms.  He has not had any problems related to the incision.    The pathology was consistent with an epidermal inclusion cyst with one of them ruptured.    Review of Systems   Constitutional:  Negative for chills and fever.   Skin:  Negative for rash and wound.       Objective   Physical Exam  Constitutional:       Appearance: He is not ill-appearing or toxic-appearing.   Skin:     Comments: Incisions: Intact with no evidence of infection.   Neurological:      Mental Status: He is alert.   Psychiatric:         Behavior: Behavior is cooperative.             Assessment & Plan     1.  Postoperative visit: The patient returns to the office after undergoing an excision of 2 separate sebaceous cyst.  He is recovering well with no problems.  The sutures were removed.  At this point he has no limitations.  He will follow-up on an as-needed basis.

## 2024-10-15 NOTE — LETTER
October 15, 2024     Ramirez Hernandez MD     Patient: Gerard Gonzalez   YOB: 1960   Date of Visit: 10/15/2024     Dear Ramirez Hernandez MD:       Thank you for referring Gerard Gonzalez to me for evaluation. Below are the relevant portions of my assessment and plan of care.    If you have questions, please do not hesitate to call me. I look forward to following Gerard along with you.         Sincerely,        Nicolas Silva Jr., MD        CC: No Recipients    Nicolas Silva Jr., MD  10/15/24 0917  Sign when Signing Visit  Subjective  Gerard Gonzalez is a 64 y.o. male who returns to the office after undergoing an excision of 2 separate sebaceous cysts on 9/26/2024.     History of present illness  The patient is recovering well with no significant postop symptoms.  He has not had any problems related to the incision.    The pathology was consistent with an epidermal inclusion cyst with one of them ruptured.    Review of Systems   Constitutional:  Negative for chills and fever.   Skin:  Negative for rash and wound.       Objective  Physical Exam  Constitutional:       Appearance: He is not ill-appearing or toxic-appearing.   Skin:     Comments: Incisions: Intact with no evidence of infection.   Neurological:      Mental Status: He is alert.   Psychiatric:         Behavior: Behavior is cooperative.             Assessment & Plan    1.  Postoperative visit: The patient returns to the office after undergoing an excision of 2 separate sebaceous cyst.  He is recovering well with no problems.  The sutures were removed.  At this point he has no limitations.  He will follow-up on an as-needed basis.

## 2024-10-24 ENCOUNTER — TELEPHONE (OUTPATIENT)
Dept: INTERNAL MEDICINE | Facility: CLINIC | Age: 64
End: 2024-10-24
Payer: COMMERCIAL

## 2024-10-24 DIAGNOSIS — I10 ESSENTIAL HYPERTENSION: Primary | ICD-10-CM

## 2024-10-24 NOTE — TELEPHONE ENCOUNTER
There is no clonidine on his medication list. He needs to keep an eye on his BP and if remains elevated in the 170's to 180's or above and he has symptoms to go to the ED. Or, follow up with Dr. Hernandez in the next 1-2 weeks.

## 2024-10-24 NOTE — TELEPHONE ENCOUNTER
Pt states he has a blood pressure today of 167/92 but he does have some neck pain.  He usually has a prescription of Clonidine from Dr Hernandez for when he has a spike but doesn't have any at this time.  He is asking for a refill of it.

## 2024-10-26 DIAGNOSIS — I10 ESSENTIAL HYPERTENSION: ICD-10-CM

## 2024-10-28 RX ORDER — AMLODIPINE BESYLATE 10 MG/1
10 TABLET ORAL DAILY
Qty: 90 TABLET | Refills: 1 | Status: SHIPPED | OUTPATIENT
Start: 2024-10-28

## 2024-10-29 ENCOUNTER — NURSE TRIAGE (OUTPATIENT)
Dept: CALL CENTER | Facility: HOSPITAL | Age: 64
End: 2024-10-29
Payer: COMMERCIAL

## 2024-10-29 NOTE — TELEPHONE ENCOUNTER
Blood pressures high starting last week.  Dr. Hernandez out of office last week.  Usually given something to bring down but they wouldn't give it to me.  Every so often it spikes-- about every 2-3 years I get a spike in my blood pressure but it doesn't usually last this long.      174/94 last night.  Normally run 134/75.  Normally takes 3 medications-- 2 mg atenolol, 100 mg losartan, 50 mg atenolol.  Did find the medication I take when I have a spike and took a dose last night.  Can't remember the name currently.    This morning blood pressure was:  159/92 before shower  163/94 after shower.     Taken off Flomax and doing low dose Cialis.  Not sleeping well bc of sciatica in legs.  Last night took half a pill at night and remaining half in AM.  Legs were more comfortable last night after  the dose.    Under more stress this week due to work.  Leaving current job and beginning a new one next week.     Due to protocol, Mr. Gonzalez needs to be seen in the office in the next 3 days.  Transferred to office for appointment.          Reason for Disposition   Systolic BP  >= 160 OR Diastolic >= 100    Additional Information   Negative: Difficult to awaken or acting confused (e.g., disoriented, slurred speech)   Negative: SEVERE difficulty breathing (e.g., struggling for each breath, speaks in single words)   Negative: [1] Weakness of the face, arm or leg on one side of the body AND [2] new-onset   Negative: [1] Numbness (i.e., loss of sensation) of the face, arm or leg on one side of the body AND [2] new-onset   Negative: [1] Chest pain lasts > 5 minutes AND [2] history of heart disease (i.e., heart attack, bypass surgery, angina, angioplasty, CHF)   Negative: [1] Chest pain AND [2] took nitrogylcerin AND [3] pain was not relieved   Negative: Sounds like a life-threatening emergency to the triager   Negative: Symptom is main concern (e.g., headache, chest pain)   Negative: Low blood pressure is main concern    "Negative: [1] Systolic BP  >= 160 OR Diastolic >= 100 AND [2] cardiac (e.g., breathing difficulty, chest pain) or neurologic symptoms (e.g., new-onset blurred or double vision, unsteady gait)   Negative: [1] Pregnant 20 or more weeks (or postpartum < 6 weeks) AND [2] new hand or face swelling   Negative: [1] Pregnant 20 or more weeks (or postpartum < 6 weeks) AND [2] Systolic BP >= 160 OR Diastolic >= 110   Negative: [1] Systolic BP  >= 200 OR Diastolic >= 120 AND [2] having NO cardiac or neurologic symptoms   Negative: [1] Pregnant 20 or more weeks (or postpartum < 6 weeks) AND [2] Systolic BP  >= 140 OR Diastolic >= 90   Negative: [1] Systolic BP  >= 180 OR Diastolic >= 110 AND [2] missed most recent dose of blood pressure medication   Negative: Systolic BP  >= 180 OR Diastolic >= 110   Negative: Ran out of BP medications    Answer Assessment - Initial Assessment Questions  1. BLOOD PRESSURE: \"What is the blood pressure?\" \"Did you take at least two measurements 5 minutes apart?\"     159/92, 163/94  2. ONSET: \"When did you take your blood pressure?\"      This morning.  Has been taking in morning and at night.   3. HOW: \"How did you take your blood pressure?\" (e.g., automatic home BP monitor, visiting nurse)      Automatic home cuff.   4. HISTORY: \"Do you have a history of high blood pressure?\"      Yes.  5. MEDICINES: \"Are you taking any medicines for blood pressure?\" \"Have you missed any doses recently?\"      2 mg atenolol, 100 mg losartan in the AM.  50 mg atenolol at night.  6. OTHER SYMPTOMS: \"Do you have any symptoms?\" (e.g., blurred vision, chest pain, difficulty breathing, headache, weakness)      Sore neck, tiny headache.  Body sore from work.   7. PREGNANCY: \"Is there any chance you are pregnant?\" \"When was your last menstrual period?\"      No.    Protocols used: Blood Pressure - High-ADULT-AH    "

## 2024-10-31 ENCOUNTER — PRIOR AUTHORIZATION (OUTPATIENT)
Dept: INTERNAL MEDICINE | Facility: CLINIC | Age: 64
End: 2024-10-31

## 2024-10-31 ENCOUNTER — OFFICE VISIT (OUTPATIENT)
Dept: INTERNAL MEDICINE | Facility: CLINIC | Age: 64
End: 2024-10-31
Payer: COMMERCIAL

## 2024-10-31 VITALS
DIASTOLIC BLOOD PRESSURE: 72 MMHG | OXYGEN SATURATION: 97 % | SYSTOLIC BLOOD PRESSURE: 148 MMHG | TEMPERATURE: 98.3 F | WEIGHT: 287.4 LBS | HEIGHT: 76 IN | HEART RATE: 68 BPM | BODY MASS INDEX: 35 KG/M2

## 2024-10-31 DIAGNOSIS — M16.12 PRIMARY OSTEOARTHRITIS OF LEFT HIP: ICD-10-CM

## 2024-10-31 DIAGNOSIS — G89.29 CHRONIC PAIN OF RIGHT KNEE: ICD-10-CM

## 2024-10-31 DIAGNOSIS — R35.1 BENIGN PROSTATIC HYPERPLASIA WITH NOCTURIA: ICD-10-CM

## 2024-10-31 DIAGNOSIS — G89.29 CHRONIC MIDLINE LOW BACK PAIN WITH RIGHT-SIDED SCIATICA: ICD-10-CM

## 2024-10-31 DIAGNOSIS — N40.1 BENIGN PROSTATIC HYPERPLASIA WITH NOCTURIA: ICD-10-CM

## 2024-10-31 DIAGNOSIS — R73.03 BORDERLINE TYPE 2 DIABETES MELLITUS: ICD-10-CM

## 2024-10-31 DIAGNOSIS — E78.2 MIXED HYPERLIPIDEMIA: ICD-10-CM

## 2024-10-31 DIAGNOSIS — R09.89 LABILE BLOOD PRESSURE: Primary | ICD-10-CM

## 2024-10-31 DIAGNOSIS — M54.41 CHRONIC MIDLINE LOW BACK PAIN WITH RIGHT-SIDED SCIATICA: ICD-10-CM

## 2024-10-31 DIAGNOSIS — M25.561 CHRONIC PAIN OF RIGHT KNEE: ICD-10-CM

## 2024-10-31 PROCEDURE — 99214 OFFICE O/P EST MOD 30 MIN: CPT | Performed by: FAMILY MEDICINE

## 2024-10-31 RX ORDER — RED BEET 500 MG
CAPSULE ORAL
COMMUNITY

## 2024-10-31 RX ORDER — TADALAFIL 5 MG/1
5 TABLET ORAL DAILY PRN
COMMUNITY

## 2024-10-31 RX ORDER — OXYCODONE AND ACETAMINOPHEN 10; 325 MG/1; MG/1
1 TABLET ORAL EVERY 12 HOURS PRN
Qty: 30 TABLET | Refills: 0 | Status: SHIPPED | OUTPATIENT
Start: 2024-10-31

## 2024-10-31 RX ORDER — MULTIVITAMIN WITH IRON
TABLET ORAL
COMMUNITY

## 2024-10-31 RX ORDER — HYDRALAZINE HYDROCHLORIDE 25 MG/1
TABLET, FILM COATED ORAL
Qty: 90 TABLET | Refills: 3 | Status: SHIPPED | OUTPATIENT
Start: 2024-10-31

## 2024-10-31 NOTE — PROGRESS NOTES
Body mass index is 33.82 kg/m². Morbid obesity complicates all aspects of disease management from diagnostic modalities to treatment. Weight loss encouraged and health benefits explained to patient.    Date of Encounter: 10/31/2024  Patient: Gerard Gonzalez,  1960    Subjective   History of Presenting Illness  Chief complaint: Elevated blood pressure    Patient presents with recurrent labile elevated blood pressure.  He has had episodes like this before and he typically manages it with hydralazine.  He can feel when his blood pressure is elevated due to pain in the back of his head.  He could not find his hydralazine until yesterday s was asking for another medicine initially.  He does tolerate hydralazine well.  He has taken it and his blood pressure is normotensive range per JNC 8 today.  He has had increased joint pain recently which may contribute to elevated blood pressure, has also been taking meloxicam regularly to control this pain.  Also has upcoming job change which is going to be for the best but little stressful right now. he does not find benefit with acetaminophen.  He has used Endocet with no side effects previously.    The following portions of the patient's history were reviewed and updated as appropriate: allergies, current medications, past family history, past medical history, past social history, past surgical history and problem list.    Patient Active Problem List   Diagnosis    Gastroesophageal reflux disease without esophagitis    Mixed hyperlipidemia    Essential hypertension    Keratosis    Borderline type 2 diabetes mellitus    Left shoulder pain    Morbid obesity    Peripheral edema    Degeneration of lumbar intervertebral disc    History of compression fracture of spine    Lumbosacral spondylosis without myelopathy    Chronic midline low back pain with right-sided sciatica    Thoracic spondylosis without myelopathy    JAKOB on CPAP    Chronic pain of right ankle    Benign prostatic hyperplasia with nocturia    Diverticulosis    Osteoarthritis, multiple sites    Hydrocele, bilateral    Fat containing left inguinal hernia    Osteoarthritis of left hip    Chronic pain of right knee     Class 1 obesity due to excess calories without serious comorbidity with body mass index (BMI) of 34.0 to 34.9 in adult    Resistant hypertension     Past Medical History:   Diagnosis Date    Allergic     Codine    Benign essential hypertension     Bruit of left carotid artery 09/13/2018    Diabetes mellitus     diet controlled    GERD (gastroesophageal reflux disease)     Headache     Hip pain, left 01/29/2016    Lymphadenopathy of head and neck 07/29/2021    Myofascial pain 10/26/2020    Pain of both elbows 08/28/2017    Sleep apnea     wears cpap    Umbilical hernia 01/29/2016     Past Surgical History:   Procedure Laterality Date    COLONOSCOPY  03/03/2016    HEMORRHOIDECTOMY      TUMOR REMOVAL      from groin, benign    UMBILICAL HERNIA REPAIR  03/03/2016     Family History   Problem Relation Age of Onset    Cancer Father         prostate    Hypertension Other     Hypertension Mother     Cancer Paternal Uncle     Cancer Paternal Grandfather        Current Outpatient Medications:     amLODIPine (NORVASC) 10 MG tablet, TAKE 1 TABLET BY MOUTH DAILY., Disp: 90 tablet, Rfl: 1    aspirin 81 MG EC tablet, Take 1 tablet by mouth Daily., Disp: , Rfl:     atenolol (TENORMIN) 50 MG tablet, Take 1 tablet by mouth Daily., Disp: 90 tablet, Rfl: 3    atorvastatin (LIPITOR) 20 MG tablet, Take 1 tablet by mouth Daily., Disp: 90 tablet, Rfl: 3    Cinnamon 500 MG capsule, Take 2 capsules by mouth Daily., Disp: , Rfl:     Coenzyme Q10 (CO Q 10 PO), Take by mouth., Disp: , Rfl:     cyclobenzaprine (FEXMID) 7.5 MG tablet, Take 1 tablet by mouth 3 (Three) Times a Day As Needed for Muscle Spasms., Disp: 90 tablet, Rfl: 3    GLUCOSAMINE-MSM-HYALURONIC ACD PO, Take  by mouth., Disp: , Rfl:     glucose monitor monitoring kit, Use 1 each As Needed (Use to check blood sugar once daily). Please dispense covered glucometer with set of lancets and strips at a reasonable quantity #100 3rf. Thanks - SW, Disp: 1 each, Rfl: 0    hydrALAZINE  (APRESOLINE) 25 MG tablet, Take 1 tab PO TID as needed for blood pressure >160 systolic or >100 diastolic, Disp: 90 tablet, Rfl: 3    hydroCHLOROthiazide 25 MG tablet, Take 1 tablet by mouth Daily., Disp: 90 tablet, Rfl: 3    KRILL OIL PO, Take  by mouth., Disp: , Rfl:     losartan (COZAAR) 100 MG tablet, Take 1 tablet daily for blood pressure, Disp: 90 tablet, Rfl: 3    Magnesium 250 MG tablet, Take  by mouth., Disp: , Rfl:     melatonin 3 MG tablet, Take 1 tablet by mouth Every Night., Disp: 90 tablet, Rfl: 3    meloxicam (MOBIC) 15 MG tablet, Take 1 tablet by mouth Daily As Needed for Moderate Pain., Disp: 90 tablet, Rfl: 3    metFORMIN (GLUCOPHAGE) 500 MG tablet, Take 2 tablets by mouth 2 (Two) Times a Day With Meals., Disp: 360 tablet, Rfl: 3    Misc Natural Products (Beet Root) 500 MG capsule, Take  by mouth., Disp: , Rfl:     Multiple Vitamins-Minerals (ZINC PO), Take  by mouth., Disp: , Rfl:     Scopolamine (Transderm-Scop, 1.5 MG,) 1 MG/3DAYS patch, Place 1 patch on the skin as directed by provider Every 72 (Seventy-Two) Hours., Disp: 4 patch, Rfl: 3    Semaglutide, 1 MG/DOSE, (Ozempic, 1 MG/DOSE,) 4 MG/3ML solution pen-injector, Inject 1 mg under the skin into the appropriate area as directed 1 (One) Time Per Week., Disp: 9 mL, Rfl: 3    tadalafil (CIALIS) 5 MG tablet, Take 1 tablet by mouth Daily As Needed for Erectile Dysfunction., Disp: , Rfl:     oxyCODONE-acetaminophen (Endocet)  MG per tablet, Take 1 tablet by mouth Every 12 (Twelve) Hours As Needed for Severe Pain., Disp: 30 tablet, Rfl: 0  Allergies   Allergen Reactions    Codeine Other (See Comments)     tremors    Valsartan Angioedema    Bactrim [Sulfamethoxazole-Trimethoprim] Nausea And Vomiting    Hydrocodone-Acetaminophen Other (See Comments)     Made me real hot     Social History     Tobacco Use    Smoking status: Never    Smokeless tobacco: Never   Vaping Use    Vaping status: Never Used   Substance Use Topics    Alcohol use: Not  "Currently     Comment: occasional    Drug use: No          Objective   Physical Exam  Vitals:    10/31/24 1105   BP: 148/72   BP Location: Right arm   Patient Position: Sitting   Cuff Size: Large Adult   Pulse: 68   Temp: 98.3 °F (36.8 °C)   TempSrc: Infrared   SpO2: 97%   Weight: 130 kg (287 lb 6.4 oz)   Height: 193 cm (76\")     Body mass index is 34.98 kg/m².    Constitutional: NAD.  Cardiovascular: RRR. No murmurs. No LE edema b/l. Radial pulses 2+ bilaterally.  Pulmonary: CTA b/l. Good effort.  Psychiatric: Normal affect. Normal thought content.     Assessment & Plan   Assessment and Plan  Very pleasant 64-year-old male with hypertension, hyperlipidemia, type 2 diabetes without complication, JAKOB on CPAP, polyarthritis, BPH, GERD, BMI greater than 35, who presents for the following:       Diagnoses and all orders for this visit:    1. Labile blood pressure (Primary): Hydralazine as needed.  Will offer him some Endocet because taking meloxicam for several days in a row may be a contributing factor.  He does have polyarthritis and has tolerated oxycodone sparingly previously.  If becomes regular we will need a controlled substance agreement.  Discussed reasons to go to the emergency room or reasons to consider changes in blood pressure regimen.  -     hydrALAZINE (APRESOLINE) 25 MG tablet; Take 1 tab PO TID as needed for blood pressure >160 systolic or >100 diastolic  Dispense: 90 tablet; Refill: 3  -     oxyCODONE-acetaminophen (Endocet)  MG per tablet; Take 1 tablet by mouth Every 12 (Twelve) Hours As Needed for Severe Pain.  Dispense: 30 tablet; Refill: 0    2. Chronic midline low back pain with right-sided sciatica  -     oxyCODONE-acetaminophen (Endocet)  MG per tablet; Take 1 tablet by mouth Every 12 (Twelve) Hours As Needed for Severe Pain.  Dispense: 30 tablet; Refill: 0  3. Chronic pain of right knee  -     oxyCODONE-acetaminophen (Endocet)  MG per tablet; Take 1 tablet by mouth Every 12 " (Twelve) Hours As Needed for Severe Pain.  Dispense: 30 tablet; Refill: 0  4. Primary osteoarthritis of left hip  -     oxyCODONE-acetaminophen (Endocet)  MG per tablet; Take 1 tablet by mouth Every 12 (Twelve) Hours As Needed for Severe Pain.  Dispense: 30 tablet; Refill: 0    5. Borderline type 2 diabetes mellitus: Requesting checking his blood work as below, also has an elevated PSA considering prostate MRI waiting to hear from urology  -     Hemoglobin A1c    6. Mixed hyperlipidemia  -     CBC & Differential  -     Comprehensive Metabolic Panel  -     Lipid Panel  -     TSH    7. Benign prostatic hyperplasia with nocturia  -     Urinalysis With Microscopic - Urine, Clean Catch  -     PSA DIAGNOSTIC    Ramirez Hernandez MD  Family Medicine  O: [phone]    Disclaimer: Parts of this note were dictated by speech recognition. Minor errors in transcription may be present. Please call if questions.

## 2024-11-01 ENCOUNTER — TRANSCRIBE ORDERS (OUTPATIENT)
Dept: ADMINISTRATIVE | Facility: HOSPITAL | Age: 64
End: 2024-11-01
Payer: COMMERCIAL

## 2024-11-01 DIAGNOSIS — R97.20 ELEVATED PSA: Primary | ICD-10-CM

## 2024-11-01 LAB
ALBUMIN SERPL-MCNC: 4.8 G/DL (ref 3.9–4.9)
ALP SERPL-CCNC: 62 IU/L (ref 44–121)
ALT SERPL-CCNC: 38 IU/L (ref 0–44)
APPEARANCE UR: CLEAR
AST SERPL-CCNC: 23 IU/L (ref 0–40)
BACTERIA #/AREA URNS HPF: NORMAL /[HPF]
BASOPHILS # BLD AUTO: 0 X10E3/UL (ref 0–0.2)
BASOPHILS NFR BLD AUTO: 1 %
BILIRUB SERPL-MCNC: 0.9 MG/DL (ref 0–1.2)
BILIRUB UR QL STRIP: NEGATIVE
BUN SERPL-MCNC: 14 MG/DL (ref 8–27)
BUN/CREAT SERPL: 14 (ref 10–24)
CALCIUM SERPL-MCNC: 9.1 MG/DL (ref 8.6–10.2)
CASTS URNS QL MICRO: NORMAL /LPF
CHLORIDE SERPL-SCNC: 103 MMOL/L (ref 96–106)
CHOLEST SERPL-MCNC: 117 MG/DL (ref 100–199)
CO2 SERPL-SCNC: 23 MMOL/L (ref 20–29)
COLOR UR: YELLOW
CREAT SERPL-MCNC: 0.98 MG/DL (ref 0.76–1.27)
EGFRCR SERPLBLD CKD-EPI 2021: 87 ML/MIN/1.73
EOSINOPHIL # BLD AUTO: 0 X10E3/UL (ref 0–0.4)
EOSINOPHIL NFR BLD AUTO: 1 %
EPI CELLS #/AREA URNS HPF: NORMAL /HPF (ref 0–10)
ERYTHROCYTE [DISTWIDTH] IN BLOOD BY AUTOMATED COUNT: 12.4 % (ref 11.6–15.4)
GLOBULIN SER CALC-MCNC: 2.1 G/DL (ref 1.5–4.5)
GLUCOSE SERPL-MCNC: 234 MG/DL (ref 70–99)
GLUCOSE UR QL STRIP: ABNORMAL
HBA1C MFR BLD: 5.9 % (ref 4.8–5.6)
HCT VFR BLD AUTO: 43.6 % (ref 37.5–51)
HDLC SERPL-MCNC: 37 MG/DL
HGB BLD-MCNC: 14.6 G/DL (ref 13–17.7)
HGB UR QL STRIP: NEGATIVE
IMM GRANULOCYTES # BLD AUTO: 0 X10E3/UL (ref 0–0.1)
IMM GRANULOCYTES NFR BLD AUTO: 0 %
KETONES UR QL STRIP: NEGATIVE
LDLC SERPL CALC-MCNC: 52 MG/DL (ref 0–99)
LEUKOCYTE ESTERASE UR QL STRIP: NEGATIVE
LYMPHOCYTES # BLD AUTO: 1 X10E3/UL (ref 0.7–3.1)
LYMPHOCYTES NFR BLD AUTO: 24 %
MCH RBC QN AUTO: 30.5 PG (ref 26.6–33)
MCHC RBC AUTO-ENTMCNC: 33.5 G/DL (ref 31.5–35.7)
MCV RBC AUTO: 91 FL (ref 79–97)
MICRO URNS: ABNORMAL
MICRO URNS: ABNORMAL
MONOCYTES # BLD AUTO: 0.3 X10E3/UL (ref 0.1–0.9)
MONOCYTES NFR BLD AUTO: 7 %
NEUTROPHILS # BLD AUTO: 3 X10E3/UL (ref 1.4–7)
NEUTROPHILS NFR BLD AUTO: 67 %
NITRITE UR QL STRIP: NEGATIVE
PH UR STRIP: 7 [PH] (ref 5–7.5)
PLATELET # BLD AUTO: 189 X10E3/UL (ref 150–450)
POTASSIUM SERPL-SCNC: 3.6 MMOL/L (ref 3.5–5.2)
PROT SERPL-MCNC: 6.9 G/DL (ref 6–8.5)
PROT UR QL STRIP: NEGATIVE
PSA SERPL-MCNC: 2.7 NG/ML (ref 0–4)
RBC # BLD AUTO: 4.79 X10E6/UL (ref 4.14–5.8)
RBC #/AREA URNS HPF: NORMAL /HPF (ref 0–2)
SODIUM SERPL-SCNC: 144 MMOL/L (ref 134–144)
SP GR UR STRIP: 1.01 (ref 1–1.03)
TRIGL SERPL-MCNC: 165 MG/DL (ref 0–149)
TSH SERPL DL<=0.005 MIU/L-ACNC: 0.55 UIU/ML (ref 0.45–4.5)
UROBILINOGEN UR STRIP-MCNC: 0.2 MG/DL (ref 0.2–1)
VLDLC SERPL CALC-MCNC: 28 MG/DL (ref 5–40)
WBC # BLD AUTO: 4.4 X10E3/UL (ref 3.4–10.8)
WBC #/AREA URNS HPF: NORMAL /HPF (ref 0–5)

## 2024-11-21 ENCOUNTER — HOSPITAL ENCOUNTER (OUTPATIENT)
Facility: HOSPITAL | Age: 64
Discharge: HOME | End: 2024-11-21
Admitting: STUDENT IN AN ORGANIZED HEALTH CARE EDUCATION/TRAINING PROGRAM
Payer: COMMERCIAL

## 2024-11-21 DIAGNOSIS — R97.20 ELEVATED PSA: ICD-10-CM

## 2024-11-21 PROCEDURE — 25510000002 GADOBENATE DIMEGLUMINE 529 MG/ML SOLUTION: Performed by: FAMILY MEDICINE

## 2024-11-21 PROCEDURE — 72197 MRI PELVIS W/O & W/DYE: CPT

## 2024-11-21 PROCEDURE — A9577 INJ MULTIHANCE: HCPCS | Performed by: FAMILY MEDICINE

## 2024-11-21 RX ORDER — CIPROFLOXACIN AND DEXAMETHASONE 3; 1 MG/ML; MG/ML
SUSPENSION/ DROPS AURICULAR (OTIC)
Qty: 7.5 ML | Refills: 1 | Status: SHIPPED | OUTPATIENT
Start: 2024-11-21

## 2024-11-21 RX ADMIN — GADOBENATE DIMEGLUMINE 20 ML: 529 INJECTION, SOLUTION INTRAVENOUS at 09:42

## 2025-01-23 ENCOUNTER — OFFICE VISIT (OUTPATIENT)
Dept: INTERNAL MEDICINE | Facility: CLINIC | Age: 65
End: 2025-01-23
Payer: COMMERCIAL

## 2025-01-23 VITALS
TEMPERATURE: 97.3 F | BODY MASS INDEX: 35.85 KG/M2 | HEIGHT: 76 IN | SYSTOLIC BLOOD PRESSURE: 136 MMHG | WEIGHT: 294.4 LBS | HEART RATE: 84 BPM | OXYGEN SATURATION: 96 % | DIASTOLIC BLOOD PRESSURE: 74 MMHG

## 2025-01-23 DIAGNOSIS — M50.30 DEGENERATIVE CERVICAL DISC: Primary | ICD-10-CM

## 2025-01-23 DIAGNOSIS — I1A.0 RESISTANT HYPERTENSION: ICD-10-CM

## 2025-01-23 PROCEDURE — 99214 OFFICE O/P EST MOD 30 MIN: CPT | Performed by: FAMILY MEDICINE

## 2025-01-23 RX ORDER — GABAPENTIN 300 MG/1
CAPSULE ORAL
Qty: 30 CAPSULE | Refills: 0 | Status: SHIPPED | OUTPATIENT
Start: 2025-01-23

## 2025-01-23 RX ORDER — PREDNISONE 10 MG/1
TABLET ORAL
Qty: 28 TABLET | Refills: 0 | Status: SHIPPED | OUTPATIENT
Start: 2025-01-23 | End: 2025-02-03

## 2025-01-23 NOTE — PROGRESS NOTES
Date of Encounter: 2025  Patient: Gerard Gonzalez,  1960    Subjective   History of Presenting Illness  Chief complaint: Elevated blood pressure, neck pain    Patient was shoveling snow with recent snowstorm and since that time has had chronic neck pain.  Mostly radiating to the left side, occasionally up into his posterior head.  He has known arthritis of the thoracolumbar spine.  No history of dedicated imaging of the cervical spine.  Describes the pain as achy.  He did try some oxycodone for it which did improve his symptoms.  About a week ago the pain was so bad that he ended up checking his blood pressure and his noticed that has been elevated with his neck pain up into the 160s-170s/80s-90s.  He had to take the hydralazine once to reduce it.  During this period of elevated blood pressure he denies headache, chest pain, shortness of breath, change in urination.  Since his pain has improved from peak his blood pressure has improved modestly as well.  He is wondering whether there are any things he can do for his chronic neck pain.    Blood pressure today is 136/74 triage, 132/68 on my manual measurement left upper extremity with large adult cuff.    The following portions of the patient's history were reviewed and updated as appropriate: allergies, current medications, past family history, past medical history, past social history, past surgical history and problem list.    Patient Active Problem List   Diagnosis    Gastroesophageal reflux disease without esophagitis    Mixed hyperlipidemia    Essential hypertension    Keratosis    Borderline type 2 diabetes mellitus    Left shoulder pain    Morbid obesity    Peripheral edema    Degeneration of lumbar intervertebral disc    History of compression fracture of spine    Lumbosacral spondylosis without myelopathy    Chronic midline low back pain with right-sided sciatica    Thoracic spondylosis without myelopathy    JAKOB on CPAP    Chronic pain of right  ankle    Benign prostatic hyperplasia with nocturia    Diverticulosis    Osteoarthritis, multiple sites    Hydrocele, bilateral    Fat containing left inguinal hernia    Osteoarthritis of left hip    Chronic pain of right knee    Class 1 obesity due to excess calories without serious comorbidity with body mass index (BMI) of 34.0 to 34.9 in adult    Resistant hypertension     Past Medical History:   Diagnosis Date    Allergic     Codine    Benign essential hypertension     Bruit of left carotid artery 09/13/2018    Diabetes mellitus     diet controlled    GERD (gastroesophageal reflux disease)     Headache     Hip pain, left 01/29/2016    Lymphadenopathy of head and neck 07/29/2021    Myofascial pain 10/26/2020    Pain of both elbows 08/28/2017    Sleep apnea     wears cpap    Umbilical hernia 01/29/2016     Past Surgical History:   Procedure Laterality Date    COLONOSCOPY  03/03/2016    HEMORRHOIDECTOMY      TUMOR REMOVAL      from groin, benign    UMBILICAL HERNIA REPAIR  03/03/2016     Family History   Problem Relation Age of Onset    Cancer Father         prostate    Hypertension Other     Hypertension Mother     Cancer Paternal Uncle     Cancer Paternal Grandfather        Current Outpatient Medications:     amLODIPine (NORVASC) 10 MG tablet, TAKE 1 TABLET BY MOUTH DAILY., Disp: 90 tablet, Rfl: 1    aspirin 81 MG EC tablet, Take 1 tablet by mouth Daily., Disp: , Rfl:     atenolol (TENORMIN) 50 MG tablet, Take 1 tablet by mouth Daily., Disp: 90 tablet, Rfl: 3    atorvastatin (LIPITOR) 20 MG tablet, Take 1 tablet by mouth Daily., Disp: 90 tablet, Rfl: 3    Cinnamon 500 MG capsule, Take 2 capsules by mouth Daily., Disp: , Rfl:     ciprofloxacin-dexAMETHasone (CIPRODEX) 0.3-0.1 % otic suspension, ADMINISTER 4 DROPS INTO    BOTH EARS TWO TIMES A DAY, Disp: 7.5 mL, Rfl: 1    Coenzyme Q10 (CO Q 10 PO), Take by mouth., Disp: , Rfl:     cyclobenzaprine (FEXMID) 7.5 MG tablet, Take 1 tablet by mouth 3 (Three) Times a Day  As Needed for Muscle Spasms., Disp: 90 tablet, Rfl: 3    gabapentin (NEURONTIN) 300 MG capsule, Take 1 tablet three times daily as needed for arthritis, Disp: 30 capsule, Rfl: 0    GLUCOSAMINE-MSM-HYALURONIC ACD PO, Take  by mouth., Disp: , Rfl:     glucose monitor monitoring kit, Use 1 each As Needed (Use to check blood sugar once daily). Please dispense covered glucometer with set of lancets and strips at a reasonable quantity #100 3rf. Thanks - SW, Disp: 1 each, Rfl: 0    hydrALAZINE (APRESOLINE) 25 MG tablet, Take 1 tab PO TID as needed for blood pressure >160 systolic or >100 diastolic, Disp: 90 tablet, Rfl: 3    hydroCHLOROthiazide 25 MG tablet, Take 1 tablet by mouth Daily., Disp: 90 tablet, Rfl: 3    KRILL OIL PO, Take  by mouth., Disp: , Rfl:     losartan (COZAAR) 100 MG tablet, Take 1 tablet daily for blood pressure, Disp: 90 tablet, Rfl: 3    Magnesium 250 MG tablet, Take  by mouth., Disp: , Rfl:     melatonin 3 MG tablet, Take 1 tablet by mouth Every Night., Disp: 90 tablet, Rfl: 3    meloxicam (MOBIC) 15 MG tablet, Take 1 tablet by mouth Daily As Needed for Moderate Pain., Disp: 90 tablet, Rfl: 3    metFORMIN (GLUCOPHAGE) 500 MG tablet, Take 2 tablets by mouth 2 (Two) Times a Day With Meals., Disp: 360 tablet, Rfl: 3    Misc Natural Products (Beet Root) 500 MG capsule, Take  by mouth., Disp: , Rfl:     Multiple Vitamins-Minerals (ZINC PO), Take  by mouth., Disp: , Rfl:     oxyCODONE-acetaminophen (Endocet)  MG per tablet, Take 1 tablet by mouth Every 12 (Twelve) Hours As Needed for Severe Pain. (Patient not taking: Reported on 1/23/2025), Disp: 30 tablet, Rfl: 0    predniSONE (DELTASONE) 10 MG tablet, Take 4 tablets by mouth Daily for 4 days, THEN 2 tablets Daily for 4 days, THEN 1 tablet Daily for 4 days., Disp: 28 tablet, Rfl: 0    Scopolamine (Transderm-Scop, 1.5 MG,) 1 MG/3DAYS patch, Place 1 patch on the skin as directed by provider Every 72 (Seventy-Two) Hours., Disp: 4 patch, Rfl: 3     "Semaglutide, 1 MG/DOSE, (Ozempic, 1 MG/DOSE,) 4 MG/3ML solution pen-injector, Inject 1 mg under the skin into the appropriate area as directed 1 (One) Time Per Week. (Patient not taking: Reported on 1/23/2025), Disp: 9 mL, Rfl: 3    tadalafil (CIALIS) 5 MG tablet, Take 1 tablet by mouth Daily As Needed for Erectile Dysfunction., Disp: , Rfl:   Allergies   Allergen Reactions    Codeine Other (See Comments)     tremors    Valsartan Angioedema    Bactrim [Sulfamethoxazole-Trimethoprim] Nausea And Vomiting    Hydrocodone-Acetaminophen Other (See Comments)     Made me real hot     Social History     Tobacco Use    Smoking status: Never    Smokeless tobacco: Never   Vaping Use    Vaping status: Never Used   Substance Use Topics    Alcohol use: Not Currently     Comment: occasional    Drug use: No          Objective   Physical Exam  Vitals:    01/23/25 1456   BP: 136/74   BP Location: Left arm   Patient Position: Sitting   Cuff Size: Large Adult   Pulse: 84   Temp: 97.3 °F (36.3 °C)   TempSrc: Infrared   SpO2: 96%   Weight: 134 kg (294 lb 6.4 oz)   Height: 193 cm (76\")     Body mass index is 35.84 kg/m².    Constitutional: NAD.  Psychiatric: Normal affect. Normal thought content.  Musculoskeletal: There is no tenderness to palpation of the thoracolumbar spine.  There is diminished curvature by exam.  He does have muscle tightness particularly in the upper trapezius and sternocleidomastoid muscles.  There is crepitus and discomfort in the neck with mildly limited range of motion in rotation left greater than right.  No discomfort with flexion or extension.  Spurling test does reproduce his pain radiating towards the left side but not as far as to be into the shoulder.       Assessment & Plan   Assessment and Plan  Very pleasant 64-year-old male with hypertension, hyperlipidemia, type 2 diabetes without complication, JAKOB on CPAP, polyarthritis, BPH, GERD, BMI greater than 35, who presents for the following:      Diagnoses " and all orders for this visit:    1. Degenerative cervical disc (Primary): Based on history and exam this is most likely degenerative disease of the cervical spine.  I recommend prednisone since his symptoms are subacute, consideration of physical therapy including needling in the upper trapezius, and trial of gabapentin for pain exacerbated by Spurling maneuver.  Reviewed risks and benefits of this medication.  He also has some Endocet and we discussed the appropriate cases to use this and things to watch out for, but he continues to use it very sparingly.  If his symptoms are not improving to tolerance I will arrange MRI if he can contact me to let me know.  -     gabapentin (NEURONTIN) 300 MG capsule; Take 1 tablet three times daily as needed for arthritis  Dispense: 30 capsule; Refill: 0  -     Ambulatory Referral to Physical Therapy for Evaluation & Treatment  -     predniSONE (DELTASONE) 10 MG tablet; Take 4 tablets by mouth Daily for 4 days, THEN 2 tablets Daily for 4 days, THEN 1 tablet Daily for 4 days.  Dispense: 28 tablet; Refill: 0    2. Resistant hypertension: Blood pressure within goal ranges today.  His home blood pressure cuff may not be accurate, additionally he was in significant pain and was not suffering any other symptoms that would be concerning for hypertensive emergency.  I do think it is prudent to have him consult with cardiology due to his risk factors including hypertension, hyperlipidemia, type 2 diabetes, JAKOB on CPAP, and excess weight.  He does have a distant history of normal cardiac stress testing.  I will arrange an echocardiogram.  Appreciate any recommendations as to his fairly resistant hypertension but we will continue the regimen the same for now.  -     Ambulatory Referral to Cardiology  -     Adult Transthoracic Echo Complete W/ Cont if Necessary Per Protocol    Ramirez Hernandez MD  Family Medicine  O: [phone]    Disclaimer: Parts of this note were dictated by speech  recognition. Minor errors in transcription may be present. Please call if questions.

## 2025-01-26 DIAGNOSIS — I10 ESSENTIAL HYPERTENSION: ICD-10-CM

## 2025-01-27 RX ORDER — HYDROCHLOROTHIAZIDE 25 MG/1
25 TABLET ORAL DAILY
Qty: 90 TABLET | Refills: 1 | Status: SHIPPED | OUTPATIENT
Start: 2025-01-27

## 2025-01-27 RX ORDER — LOSARTAN POTASSIUM 100 MG/1
TABLET ORAL
Qty: 90 TABLET | Refills: 1 | Status: SHIPPED | OUTPATIENT
Start: 2025-01-27

## 2025-02-07 ENCOUNTER — TELEPHONE (OUTPATIENT)
Dept: CARDIOLOGY | Facility: CLINIC | Age: 65
End: 2025-02-07
Payer: COMMERCIAL

## 2025-02-10 ENCOUNTER — TELEPHONE (OUTPATIENT)
Dept: CARDIOLOGY | Facility: CLINIC | Age: 65
End: 2025-02-10

## 2025-02-10 NOTE — TELEPHONE ENCOUNTER
Caller: Gerard Gonzalez    Relationship to patient: Self    Best call back number: [phone]     Patient is needing: PT HAS FLU AND WISHES TO CANCEL HIS ECHO APPT. PT SAID HE WILL CALL BACK IN TO R/S ONCE HE'S FEELING BETTER.

## 2025-02-11 ENCOUNTER — OFFICE VISIT (OUTPATIENT)
Dept: INTERNAL MEDICINE | Facility: CLINIC | Age: 65
End: 2025-02-11
Payer: COMMERCIAL

## 2025-02-11 VITALS
OXYGEN SATURATION: 97 % | HEIGHT: 76 IN | DIASTOLIC BLOOD PRESSURE: 82 MMHG | SYSTOLIC BLOOD PRESSURE: 138 MMHG | WEIGHT: 294 LBS | BODY MASS INDEX: 35.8 KG/M2 | TEMPERATURE: 97.5 F | HEART RATE: 72 BPM

## 2025-02-11 DIAGNOSIS — R05.1 ACUTE COUGH: Primary | ICD-10-CM

## 2025-02-11 DIAGNOSIS — J10.1 INFLUENZA A: ICD-10-CM

## 2025-02-11 LAB
EXPIRATION DATE: ABNORMAL
FLUAV AG UPPER RESP QL IA.RAPID: DETECTED
FLUBV AG UPPER RESP QL IA.RAPID: NOT DETECTED
INTERNAL CONTROL: ABNORMAL
Lab: ABNORMAL
SARS-COV-2 AG UPPER RESP QL IA.RAPID: NOT DETECTED

## 2025-02-11 PROCEDURE — 87428 SARSCOV & INF VIR A&B AG IA: CPT | Performed by: NURSE PRACTITIONER

## 2025-02-11 PROCEDURE — 99213 OFFICE O/P EST LOW 20 MIN: CPT | Performed by: NURSE PRACTITIONER

## 2025-02-11 RX ORDER — OSELTAMIVIR PHOSPHATE 75 MG/1
75 CAPSULE ORAL 2 TIMES DAILY
Qty: 10 CAPSULE | Refills: 0 | Status: SHIPPED | OUTPATIENT
Start: 2025-02-11 | End: 2025-02-16

## 2025-02-11 NOTE — PROGRESS NOTES
"Chief Complaint  Cough (Symptoms definitely since Saturday), Fever, and Generalized Body Aches    Subjective        Gerard Gonzalez presents to [facility]  History of Present Illness  No wheezing or SOA. Has been taking routine medication except for gabapentin to help with pain. Has never taken Tamiflu in the past.       Objective   Vital Signs:  /82   Pulse 72   Temp 97.5 °F (36.4 °C)   Ht 193 cm (76\")   Wt 133 kg (294 lb)   SpO2 97%   BMI 35.79 kg/m²   Estimated body mass index is 35.79 kg/m² as calculated from the following:    Height as of this encounter: 193 cm (76\").    Weight as of this encounter: 133 kg (294 lb).               Physical Exam  Vitals and nursing note reviewed.   Constitutional:       General: He is not in acute distress.     Appearance: Normal appearance. He is not ill-appearing, toxic-appearing or diaphoretic.      Comments: Patient is wearing a mask.    HENT:      Right Ear: A middle ear effusion (mild, serous) is present.      Left Ear: A middle ear effusion (mild, serous) is present.   Cardiovascular:      Rate and Rhythm: Normal rate and regular rhythm.      Heart sounds: Normal heart sounds.   Pulmonary:      Effort: Pulmonary effort is normal.      Breath sounds: Normal breath sounds.   Skin:     General: Skin is warm and dry.   Neurological:      General: No focal deficit present.      Mental Status: He is alert and oriented to person, place, and time. Mental status is at baseline.        Result Review :                   Assessment and Plan   Patient is a very pleasant 64-year-old male with multiple comorbidities including obesity, hypertension, multiple musculoskeletal issues JAKOB on CPAP here with 4-day history upper respiratory infection symptoms testing positive for influenza A.     Results for orders placed or performed in visit on 02/11/25   POCT SARS-CoV-2 + Flu Antigen MARK    Collection Time: 02/11/25  9:49 AM    Specimen: Swab "   Result Value Ref Range    SARS Antigen Not Detected Not Detected, Presumptive Negative    Influenza A Antigen MARK Detected (A) Not Detected    Influenza B Antigen MARK Not Detected Not Detected    Internal Control Passed Passed    Lot Number [id number]     Expiration Date 11,142,025            Diagnoses and all orders for this visit:    1. Acute cough (Primary)  -     POCT SARS-CoV-2 + Flu Antigen MARK    2. Influenza A  -     oseltamivir (Tamiflu) 75 MG capsule; Take 1 capsule by mouth 2 (Two) Times a Day for 5 days.  Dispense: 10 capsule; Refill: 0             Follow Up   Return for Follow up with Dr. Hernandez as needed..  Patient was given instructions and counseling regarding his condition or for health maintenance advice. Please see specific information pulled into the AVS if appropriate.

## 2025-02-20 ENCOUNTER — TELEPHONE (OUTPATIENT)
Dept: CARDIOLOGY | Facility: CLINIC | Age: 65
End: 2025-02-20
Payer: COMMERCIAL

## 2025-02-21 ENCOUNTER — HOSPITAL ENCOUNTER (OUTPATIENT)
Dept: CARDIOLOGY | Facility: HOSPITAL | Age: 65
Discharge: HOME | End: 2025-02-21
Admitting: FAMILY MEDICINE
Payer: COMMERCIAL

## 2025-02-21 VITALS
DIASTOLIC BLOOD PRESSURE: 82 MMHG | HEIGHT: 76 IN | SYSTOLIC BLOOD PRESSURE: 156 MMHG | WEIGHT: 294 LBS | HEART RATE: 60 BPM | BODY MASS INDEX: 35.8 KG/M2

## 2025-02-21 LAB
AORTIC DIMENSIONLESS INDEX: 0.85 (DI)
ASCENDING AORTA: 3.1 CM
AV MEAN PRESS GRAD SYS DOP V1V2: 5 MMHG
AV VMAX SYS DOP: 154 CM/SEC
BH CV ECHO MEAS - ACS: 2.5 CM
BH CV ECHO MEAS - AI P1/2T: 1167 MSEC
BH CV ECHO MEAS - AO MAX PG: 9.5 MMHG
BH CV ECHO MEAS - AO ROOT AREA (BSA CORRECTED): 1.5 CM2
BH CV ECHO MEAS - AO ROOT DIAM: 3.8 CM
BH CV ECHO MEAS - AO V2 VTI: 34.6 CM
BH CV ECHO MEAS - AVA(I,D): 2.8 CM2
BH CV ECHO MEAS - EDV(CUBED): 185.2 ML
BH CV ECHO MEAS - EDV(MOD-SP2): 181 ML
BH CV ECHO MEAS - EDV(MOD-SP4): 186 ML
BH CV ECHO MEAS - EF(MOD-SP2): 68.5 %
BH CV ECHO MEAS - EF(MOD-SP4): 72 %
BH CV ECHO MEAS - ESV(CUBED): 49.2 ML
BH CV ECHO MEAS - ESV(MOD-SP2): 57 ML
BH CV ECHO MEAS - ESV(MOD-SP4): 52 ML
BH CV ECHO MEAS - FS: 35.7 %
BH CV ECHO MEAS - IVS/LVPW: 1.11 CM
BH CV ECHO MEAS - IVSD: 1 CM
BH CV ECHO MEAS - LAT PEAK E' VEL: 11.5 CM/SEC
BH CV ECHO MEAS - LV DIASTOLIC VOL/BSA (35-75): 71.3 CM2
BH CV ECHO MEAS - LV MASS(C)D: 211.7 GRAMS
BH CV ECHO MEAS - LV MAX PG: 6.8 MMHG
BH CV ECHO MEAS - LV MEAN PG: 3 MMHG
BH CV ECHO MEAS - LV SYSTOLIC VOL/BSA (12-30): 19.9 CM2
BH CV ECHO MEAS - LV V1 MAX: 130 CM/SEC
BH CV ECHO MEAS - LV V1 VTI: 29.5 CM
BH CV ECHO MEAS - LVIDD: 5.7 CM
BH CV ECHO MEAS - LVIDS: 3.7 CM
BH CV ECHO MEAS - LVOT AREA: 3.3 CM2
BH CV ECHO MEAS - LVOT DIAM: 2.04 CM
BH CV ECHO MEAS - LVPWD: 0.9 CM
BH CV ECHO MEAS - MED PEAK E' VEL: 8.3 CM/SEC
BH CV ECHO MEAS - MR MAX PG: 59.1 MMHG
BH CV ECHO MEAS - MR MAX VEL: 384.4 CM/SEC
BH CV ECHO MEAS - MV A DUR: 0.17 SEC
BH CV ECHO MEAS - MV A MAX VEL: 67.7 CM/SEC
BH CV ECHO MEAS - MV DEC SLOPE: 603.1 CM/SEC2
BH CV ECHO MEAS - MV DEC TIME: 0.13 SEC
BH CV ECHO MEAS - MV E MAX VEL: 89.1 CM/SEC
BH CV ECHO MEAS - MV E/A: 1.32
BH CV ECHO MEAS - MV MAX PG: 3.6 MMHG
BH CV ECHO MEAS - MV MEAN PG: 1.13 MMHG
BH CV ECHO MEAS - MV P1/2T: 48.9 MSEC
BH CV ECHO MEAS - MV V2 VTI: 34.2 CM
BH CV ECHO MEAS - MVA(P1/2T): 4.5 CM2
BH CV ECHO MEAS - MVA(VTI): 2.8 CM2
BH CV ECHO MEAS - PA V2 MAX: 114.6 CM/SEC
BH CV ECHO MEAS - PULM A REVS DUR: 0.13 SEC
BH CV ECHO MEAS - PULM A REVS VEL: 22.2 CM/SEC
BH CV ECHO MEAS - PULM DIAS VEL: 24.5 CM/SEC
BH CV ECHO MEAS - PULM S/D: 1.05
BH CV ECHO MEAS - PULM SYS VEL: 25.7 CM/SEC
BH CV ECHO MEAS - QP/QS: 0.52
BH CV ECHO MEAS - RAP SYSTOLE: 3 MMHG
BH CV ECHO MEAS - RV MAX PG: 1.13 MMHG
BH CV ECHO MEAS - RV V1 MAX: 53.1 CM/SEC
BH CV ECHO MEAS - RV V1 VTI: 11.4 CM
BH CV ECHO MEAS - RVOT DIAM: 2.36 CM
BH CV ECHO MEAS - RVSP: 20 MMHG
BH CV ECHO MEAS - SV(LVOT): 96.2 ML
BH CV ECHO MEAS - SV(MOD-SP2): 124 ML
BH CV ECHO MEAS - SV(MOD-SP4): 134 ML
BH CV ECHO MEAS - SV(RVOT): 49.9 ML
BH CV ECHO MEAS - SVI(LVOT): 36.9 ML/M2
BH CV ECHO MEAS - SVI(MOD-SP2): 47.5 ML/M2
BH CV ECHO MEAS - SVI(MOD-SP4): 51.3 ML/M2
BH CV ECHO MEAS - TAPSE (>1.6): 3.2 CM
BH CV ECHO MEAS - TR MAX PG: 17 MMHG
BH CV ECHO MEAS - TR MAX VEL: 206.3 CM/SEC
BH CV ECHO MEASUREMENTS AVERAGE E/E' RATIO: 9
BH CV XLRA - RV BASE: 4.4 CM
BH CV XLRA - RV LENGTH: 8.3 CM
BH CV XLRA - RV MID: 3.4 CM
BH CV XLRA - TDI S': 13.1 CM/SEC
LEFT ATRIUM VOLUME INDEX: 42.1 ML/M2
LV EF BIPLANE MOD: 69.9 %
SINUS: 3 CM
STJ: 2.9 CM

## 2025-02-21 PROCEDURE — 25510000001 PERFLUTREN 6.52 MG/ML SUSPENSION 2 ML VIAL: Performed by: FAMILY MEDICINE

## 2025-02-21 PROCEDURE — 93306 TTE W/DOPPLER COMPLETE: CPT

## 2025-02-21 RX ADMIN — PERFLUTREN 1 ML: 6.52 INJECTION, SUSPENSION INTRAVENOUS at 08:34

## 2025-02-24 ENCOUNTER — OFFICE VISIT (OUTPATIENT)
Dept: CARDIOLOGY | Facility: CLINIC | Age: 65
End: 2025-02-24
Payer: COMMERCIAL

## 2025-02-24 VITALS
BODY MASS INDEX: 36.83 KG/M2 | WEIGHT: 302.4 LBS | HEIGHT: 76 IN | OXYGEN SATURATION: 97 % | SYSTOLIC BLOOD PRESSURE: 154 MMHG | DIASTOLIC BLOOD PRESSURE: 74 MMHG | HEART RATE: 58 BPM

## 2025-02-24 DIAGNOSIS — G47.33 OSA ON CPAP: ICD-10-CM

## 2025-02-24 DIAGNOSIS — R07.89 OTHER CHEST PAIN: ICD-10-CM

## 2025-02-24 DIAGNOSIS — E78.2 MIXED HYPERLIPIDEMIA: ICD-10-CM

## 2025-02-24 DIAGNOSIS — R06.02 SOB (SHORTNESS OF BREATH): ICD-10-CM

## 2025-02-24 DIAGNOSIS — I10 ESSENTIAL HYPERTENSION: Primary | ICD-10-CM

## 2025-02-24 PROCEDURE — 93000 ELECTROCARDIOGRAM COMPLETE: CPT | Performed by: INTERNAL MEDICINE

## 2025-02-24 PROCEDURE — 99204 OFFICE O/P NEW MOD 45 MIN: CPT | Performed by: INTERNAL MEDICINE

## 2025-02-24 RX ORDER — SPIRONOLACTONE 25 MG/1
25 TABLET ORAL DAILY
Qty: 90 TABLET | Refills: 3 | Status: SHIPPED | OUTPATIENT
Start: 2025-02-24

## 2025-02-24 NOTE — PROGRESS NOTES
Subjective:     Encounter Date:02/24/25      Patient ID: Gerard Gonzalez is a 64 y.o. male.    Chief Complaint:  History of Present Illness    Dear  Dr. Hernandez,    I had the pleasure of seeing this patient in the office today for initial evaluation and consultation.  I appreciate that you sent him in to see us.  They come in today to be seen for cardiac risk factors Willes incompletely controlled hypertension.    Patient's been having issues with labile hypertension.  He has noted more last couple of weeks but he had influenza A a couple of weeks ago and is still not feeling good.    He has constant minimal chest achiness, been having this for decades.  Has a sore spot in his chest that this is really dates back to when he had it injured when he was playing high school sports, sometimes that acts up a little bit.  That is what he feels pretty much all the time.  Some shortness of breath at times with that actually had been doing fine lately until he got the influenza.  No other associated symptoms, no particular symptoms when he does any type of activity.  He said he has had a prior stress echo in the past that was entirely normal.     He tracks his blood pressure and it just has not been is consistently controlled as he knows it should be.    The following portions of the patient's history were reviewed and updated as appropriate: allergies, current medications, past family history, past medical history, past social history, past surgical history and problem list.      ECG 12 Lead    Date/Time: 2/24/2025 2:44 PM  Performed by: De Green III, MD    Authorized by: De Green III, MD  Comparison: compared with previous ECG   Similar to previous ECG  Rhythm: sinus rhythm  Rate: normal  Conduction: conduction normal  ST Segments: ST segments normal  T Waves: T waves normal  QRS axis: normal  Other: no other findings    Clinical impression: normal ECG             Objective:     Vitals:    02/24/25 1422  "  BP: 154/74   BP Location: Right arm   Patient Position: Sitting   Cuff Size: Large Adult   Pulse: 58   SpO2: 97%   Weight: (!) 137 kg (302 lb 6.4 oz)   Height: 193 cm (76\")     Body mass index is 36.81 kg/m².      Vitals reviewed.   Constitutional:       General: Not in acute distress.     Appearance: Well-developed. Not diaphoretic.   Eyes:      General:         Right eye: No discharge.         Left eye: No discharge.      Conjunctiva/sclera: Conjunctivae normal.   HENT:      Head: Normocephalic and atraumatic.      Nose: Nose normal.   Neck:      Thyroid: No thyromegaly.      Trachea: No tracheal deviation.   Pulmonary:      Effort: Pulmonary effort is normal. No respiratory distress.      Breath sounds: Normal breath sounds. No stridor.   Chest:      Chest wall: Not tender to palpatation.   Cardiovascular:      Normal rate. Regular rhythm.      Murmurs: There is no murmur.      . No S3 gallop. No click. No rub.   Pulses:     Intact distal pulses.   Edema:     Peripheral edema absent.   Abdominal:      General: Bowel sounds are normal. There is no distension.      Palpations: Abdomen is soft. There is no abdominal mass.   Musculoskeletal: Normal range of motion.         General: No tenderness or deformity.      Cervical back: Normal range of motion and neck supple. Skin:     General: Skin is warm and dry.      Findings: No erythema or rash.   Neurological:      Mental Status: Alert.   Psychiatric:         Attention and Perception: Attention normal.         Data and records reviewed:     Lab Results   Component Value Date    GLUCOSE 234 (H) 10/31/2024    BUN 14 10/31/2024    CREATININE 0.98 10/31/2024     10/31/2024    K 3.6 10/31/2024     10/31/2024    CALCIUM 9.1 10/31/2024    PROTEINTOT 6.9 10/31/2024    ALBUMIN 4.8 10/31/2024    ALT 38 10/31/2024    AST 23 10/31/2024    ALKPHOS 62 10/31/2024    BILITOT 0.9 10/31/2024    GLOB 2.1 10/31/2024    AGRATIO 2.1 02/20/2024    BCR 14 10/31/2024    ANIONGAP " "9.1 11/09/2023    EGFR 87 10/31/2024     No results found for: \"CHOL\"  Lab Results   Component Value Date    TRIG 165 (H) 10/31/2024    TRIG 104 02/20/2024    TRIG 130 09/15/2023     Lab Results   Component Value Date    HDL 37 (L) 10/31/2024    HDL 35 (L) 02/20/2024    HDL 33 (L) 09/15/2023     Lab Results   Component Value Date    LDL 52 10/31/2024    LDL 34 02/20/2024    LDL 58 09/15/2023     Lab Results   Component Value Date    VLDL 28 10/31/2024    VLDL 20 02/20/2024    VLDL 23 09/15/2023     Lab Results   Component Value Date    LDLHDL 3.41 04/26/2017    LDLHDL 3.5 12/21/2015     CBC          10/31/2024    11:36   CBC   WBC 4.4    RBC 4.79    Hemoglobin 14.6    Hematocrit 43.6    MCV 91    MCH 30.5    MCHC 33.5    RDW 12.4    Platelets 189      No radiology results for the last 90 days.  Results for orders placed in visit on 01/23/25    Adult Transthoracic Echo Complete W/ Cont if Necessary Per Protocol    Interpretation Summary    Left ventricular systolic function is normal. Calculated left ventricular EF = 69.9% Normal left ventricular cavity size and wall thickness noted. All left ventricular wall segments contract normally. Left ventricular diastolic function was normal.    The left atrial cavity is severely dilated.  The right atrial cavity is mildly dilated    There is moderate thickening of the aortic valve. The aortic valve appears trileaflet. Trace aortic valve regurgitation is present. No aortic valve stenosis is present.    Mild mitral valve regurgitation is present.    Trace tricuspid valve regurgitation is present. Estimated right ventricular systolic pressure from tricuspid regurgitation is normal (<35 mmHg). Calculated right ventricular systolic pressure from tricuspid regurgitation is 20.0 mmHg.          Assessment:          Diagnosis Plan   1. Essential hypertension  ECG 12 Lead    spironolactone (ALDACTONE) 25 MG tablet    CT Cardiac Calcium Score Without Dye      2. Other chest pain  ECG " 12 Lead    spironolactone (ALDACTONE) 25 MG tablet    CT Cardiac Calcium Score Without Dye      3. SOB (shortness of breath)  ECG 12 Lead    spironolactone (ALDACTONE) 25 MG tablet    CT Cardiac Calcium Score Without Dye      4. Mixed hyperlipidemia  CT Cardiac Calcium Score Without Dye      5. JAKOB on CPAP  CT Cardiac Calcium Score Without Dye             Plan:       1.  Hypertension-we will add spironolactone and see if that helps with improving and tightening he has blood pressure control.  If that has not beneficial alternatively we could consider doxazosin, he does have some times where he has to strain a little bit to urinate.  I will have her report back 2 weeks  2.  Cardiac risk factors, really no anginal symptoms, at this point we will arrange for a coronary artery calcium scan to be performed.    Thank you very much for allowing us to participate in the care of this pleasant patient.  Please don't hesitate to call if I can be of assistance in any way.      Current Outpatient Medications:     amLODIPine (NORVASC) 10 MG tablet, TAKE 1 TABLET BY MOUTH DAILY., Disp: 90 tablet, Rfl: 1    aspirin 81 MG EC tablet, Take 1 tablet by mouth Daily., Disp: , Rfl:     atenolol (TENORMIN) 50 MG tablet, Take 1 tablet by mouth Daily., Disp: 90 tablet, Rfl: 3    atorvastatin (LIPITOR) 20 MG tablet, Take 1 tablet by mouth Daily., Disp: 90 tablet, Rfl: 3    Cholecalciferol (VITAMIN D-3 PO), Take 2 each by mouth Daily. 2000 IU, Disp: , Rfl:     Cinnamon 500 MG capsule, Take 2 capsules by mouth Daily., Disp: , Rfl:     Coenzyme Q10 (CO Q 10 PO), Take by mouth., Disp: , Rfl:     cyclobenzaprine (FEXMID) 7.5 MG tablet, Take 1 tablet by mouth 3 (Three) Times a Day As Needed for Muscle Spasms., Disp: 90 tablet, Rfl: 3    gabapentin (NEURONTIN) 300 MG capsule, Take 1 tablet three times daily as needed for arthritis, Disp: 30 capsule, Rfl: 0    GLUCOSAMINE-MSM-HYALURONIC ACD PO, Take  by mouth., Disp: , Rfl:     glucose monitor  monitoring kit, Use 1 each As Needed (Use to check blood sugar once daily). Please dispense covered glucometer with set of lancets and strips at a reasonable quantity #100 3rf. Thanks - SW, Disp: 1 each, Rfl: 0    hydrALAZINE (APRESOLINE) 25 MG tablet, Take 1 tab PO TID as needed for blood pressure >160 systolic or >100 diastolic, Disp: 90 tablet, Rfl: 3    hydroCHLOROthiazide 25 MG tablet, TAKE ONE TABLET BY MOUTH EVERY DAY, Disp: 90 tablet, Rfl: 1    KRILL OIL PO, Take  by mouth. (Patient taking differently: Take  by mouth. 4 days a week), Disp: , Rfl:     losartan (COZAAR) 100 MG tablet, TAKE ONE TABLET BY MOUTH EVERY DAY FOR BLOOD PRESSURE, Disp: 90 tablet, Rfl: 1    Magnesium 250 MG tablet, Take  by mouth., Disp: , Rfl:     melatonin 3 MG tablet, Take 1 tablet by mouth Every Night., Disp: 90 tablet, Rfl: 3    meloxicam (MOBIC) 15 MG tablet, Take 1 tablet by mouth Daily As Needed for Moderate Pain., Disp: 90 tablet, Rfl: 3    metFORMIN (GLUCOPHAGE) 500 MG tablet, Take 2 tablets by mouth 2 (Two) Times a Day With Meals., Disp: 360 tablet, Rfl: 3    Misc Natural Products (Beet Root) 500 MG capsule, Take  by mouth., Disp: , Rfl:     Multiple Vitamins-Minerals (ZINC PO), Take  by mouth., Disp: , Rfl:     oxyCODONE-acetaminophen (Endocet)  MG per tablet, Take 1 tablet by mouth Every 12 (Twelve) Hours As Needed for Severe Pain., Disp: 30 tablet, Rfl: 0    Scopolamine (Transderm-Scop, 1.5 MG,) 1 MG/3DAYS patch, Place 1 patch on the skin as directed by provider Every 72 (Seventy-Two) Hours., Disp: 4 patch, Rfl: 3    Semaglutide, 1 MG/DOSE, (Ozempic, 1 MG/DOSE,) 4 MG/3ML solution pen-injector, Inject 1 mg under the skin into the appropriate area as directed 1 (One) Time Per Week. (Patient taking differently: Inject 1 mg under the skin into the appropriate area as directed 1 (One) Time Per Week. Has not taken in about 5/6 weeks), Disp: 9 mL, Rfl: 3    tadalafil (CIALIS) 5 MG tablet, Take 1 tablet by mouth Daily As  Needed for Erectile Dysfunction., Disp: , Rfl:     spironolactone (ALDACTONE) 25 MG tablet, Take 1 tablet by mouth Daily., Disp: 90 tablet, Rfl: 3         No follow-ups on file.

## 2025-03-13 ENCOUNTER — OFFICE VISIT (OUTPATIENT)
Dept: INTERNAL MEDICINE | Facility: CLINIC | Age: 65
End: 2025-03-13
Payer: COMMERCIAL

## 2025-03-13 VITALS
SYSTOLIC BLOOD PRESSURE: 134 MMHG | BODY MASS INDEX: 35.56 KG/M2 | HEART RATE: 66 BPM | TEMPERATURE: 97.8 F | DIASTOLIC BLOOD PRESSURE: 68 MMHG | OXYGEN SATURATION: 97 % | WEIGHT: 292 LBS | HEIGHT: 76 IN

## 2025-03-13 DIAGNOSIS — M19.071 ARTHRITIS OF RIGHT FOOT: ICD-10-CM

## 2025-03-13 DIAGNOSIS — M25.561 CHRONIC PAIN OF RIGHT KNEE: ICD-10-CM

## 2025-03-13 DIAGNOSIS — G89.29 CHRONIC MIDLINE LOW BACK PAIN WITH RIGHT-SIDED SCIATICA: ICD-10-CM

## 2025-03-13 DIAGNOSIS — E78.2 MIXED HYPERLIPIDEMIA: ICD-10-CM

## 2025-03-13 DIAGNOSIS — I10 ESSENTIAL HYPERTENSION: Primary | ICD-10-CM

## 2025-03-13 DIAGNOSIS — R07.89 OTHER CHEST PAIN: ICD-10-CM

## 2025-03-13 DIAGNOSIS — M54.41 CHRONIC MIDLINE LOW BACK PAIN WITH RIGHT-SIDED SCIATICA: ICD-10-CM

## 2025-03-13 DIAGNOSIS — R09.89 LABILE BLOOD PRESSURE: ICD-10-CM

## 2025-03-13 DIAGNOSIS — R73.03 BORDERLINE TYPE 2 DIABETES MELLITUS: ICD-10-CM

## 2025-03-13 DIAGNOSIS — R06.02 SOB (SHORTNESS OF BREATH): ICD-10-CM

## 2025-03-13 DIAGNOSIS — G89.29 CHRONIC PAIN OF RIGHT KNEE: ICD-10-CM

## 2025-03-13 DIAGNOSIS — R35.1 BENIGN PROSTATIC HYPERPLASIA WITH NOCTURIA: ICD-10-CM

## 2025-03-13 DIAGNOSIS — N40.1 BENIGN PROSTATIC HYPERPLASIA WITH NOCTURIA: ICD-10-CM

## 2025-03-13 PROCEDURE — 99213 OFFICE O/P EST LOW 20 MIN: CPT | Performed by: FAMILY MEDICINE

## 2025-03-13 RX ORDER — MELOXICAM 15 MG/1
15 TABLET ORAL DAILY PRN
Qty: 90 TABLET | Refills: 3 | Status: SHIPPED | OUTPATIENT
Start: 2025-03-13

## 2025-03-13 RX ORDER — ATENOLOL 50 MG/1
50 TABLET ORAL DAILY
Qty: 90 TABLET | Refills: 3 | Status: SHIPPED | OUTPATIENT
Start: 2025-03-13

## 2025-03-13 RX ORDER — LOSARTAN POTASSIUM 100 MG/1
TABLET ORAL
Qty: 90 TABLET | Refills: 4 | Status: SHIPPED | OUTPATIENT
Start: 2025-03-13

## 2025-03-13 RX ORDER — AMLODIPINE BESYLATE 10 MG/1
10 TABLET ORAL DAILY
Qty: 90 TABLET | Refills: 4 | Status: SHIPPED | OUTPATIENT
Start: 2025-03-13

## 2025-03-13 RX ORDER — HYDROCHLOROTHIAZIDE 25 MG/1
25 TABLET ORAL DAILY
Qty: 90 TABLET | Refills: 4 | Status: SHIPPED | OUTPATIENT
Start: 2025-03-13

## 2025-03-13 RX ORDER — HYDRALAZINE HYDROCHLORIDE 25 MG/1
TABLET, FILM COATED ORAL
Qty: 90 TABLET | Refills: 3 | Status: SHIPPED | OUTPATIENT
Start: 2025-03-13

## 2025-03-13 RX ORDER — CYCLOBENZAPRINE HYDROCHLORIDE 7.5 MG/1
7.5 TABLET, FILM COATED ORAL 3 TIMES DAILY PRN
Qty: 90 TABLET | Refills: 3 | Status: SHIPPED | OUTPATIENT
Start: 2025-03-13

## 2025-03-13 RX ORDER — ATORVASTATIN CALCIUM 20 MG/1
20 TABLET, FILM COATED ORAL DAILY
Qty: 90 TABLET | Refills: 3 | Status: SHIPPED | OUTPATIENT
Start: 2025-03-13

## 2025-03-13 NOTE — PROGRESS NOTES
Date of Encounter: 2025  Patient: Gerard Gonzalez,  1960    Subjective   History of Presenting Illness  Chief complaint: Follow-up    He has been having right foot pain on the lateral aspect of his foot.  Focal, worsens with walking.  Dr. Brady felt that he needed different shoe inserts.  He had an x-ray of the foot in  which demonstrated midfoot, in particular talonavicular arthritis, alongside first MTP and ankle arthritis.  He has not had any recent injuries of this foot.  He does follow with sports medicine Dr. Grullon.     Most recently put on spironolactone for uncontrolled blood pressure with findings of dilated left atrium on echocardiogram, preserved ejection fraction.  He has been tolerating this well.  Not having any urinary difficulties with it.  Blood pressure of the past few measurements at home has been 130s-140s systolic.  No hypotension.    Does want PSA follow-up in context of upcoming urology appointment    We will make sure all his medications are refilled    The following portions of the patient's history were reviewed and updated as appropriate: allergies, current medications, past family history, past medical history, past social history, past surgical history and problem list.    Patient Active Problem List   Diagnosis    Gastroesophageal reflux disease without esophagitis    Mixed hyperlipidemia    Essential hypertension    Keratosis    Borderline type 2 diabetes mellitus    Left shoulder pain    Morbid obesity    Peripheral edema    Degeneration of lumbar intervertebral disc    History of compression fracture of spine    Lumbosacral spondylosis without myelopathy    Chronic midline low back pain with right-sided sciatica    Thoracic spondylosis without myelopathy    JAKOB on CPAP    Chronic pain of right ankle    Benign prostatic hyperplasia with nocturia    Diverticulosis    Osteoarthritis, multiple sites    Hydrocele, bilateral    Fat containing left inguinal hernia     Osteoarthritis of left hip    Chronic pain of right knee    Class 1 obesity due to excess calories without serious comorbidity with body mass index (BMI) of 34.0 to 34.9 in adult    Resistant hypertension     Past Medical History:   Diagnosis Date    Allergic     Codine    Benign essential hypertension     Bruit of left carotid artery 09/13/2018    Diabetes mellitus     diet controlled    GERD (gastroesophageal reflux disease)     Headache     Hip pain, left 01/29/2016    Lymphadenopathy of head and neck 07/29/2021    Myofascial pain 10/26/2020    Pain of both elbows 08/28/2017    Sleep apnea     wears cpap    Umbilical hernia 01/29/2016     Past Surgical History:   Procedure Laterality Date    COLONOSCOPY  03/03/2016    HEMORRHOIDECTOMY      TUMOR REMOVAL      from groin, benign    UMBILICAL HERNIA REPAIR  03/03/2016     Family History   Problem Relation Age of Onset    Cancer Father         prostate    Hypertension Other     Hypertension Mother     Cancer Paternal Uncle     Cancer Paternal Grandfather        Current Outpatient Medications:     amLODIPine (NORVASC) 10 MG tablet, Take 1 tablet by mouth Daily., Disp: 90 tablet, Rfl: 4    aspirin 81 MG EC tablet, Take 1 tablet by mouth Daily., Disp: , Rfl:     atenolol (TENORMIN) 50 MG tablet, Take 1 tablet by mouth Daily., Disp: 90 tablet, Rfl: 3    atorvastatin (LIPITOR) 20 MG tablet, Take 1 tablet by mouth Daily., Disp: 90 tablet, Rfl: 3    Cholecalciferol (VITAMIN D-3 PO), Take 2 each by mouth Daily. 2000 IU, Disp: , Rfl:     Cinnamon 500 MG capsule, Take 2 capsules by mouth Daily., Disp: , Rfl:     Coenzyme Q10 (CO Q 10 PO), Take by mouth., Disp: , Rfl:     cyclobenzaprine (FEXMID) 7.5 MG tablet, Take 1 tablet by mouth 3 (Three) Times a Day As Needed for Muscle Spasms., Disp: 90 tablet, Rfl: 3    gabapentin (NEURONTIN) 300 MG capsule, Take 1 tablet three times daily as needed for arthritis, Disp: 30 capsule, Rfl: 0    GLUCOSAMINE-MSM-HYALURONIC ACD PO, Take  by  mouth., Disp: , Rfl:     glucose monitor monitoring kit, Use 1 each As Needed (Use to check blood sugar once daily). Please dispense covered glucometer with set of lancets and strips at a reasonable quantity #100 3rf. Thanks - SW, Disp: 1 each, Rfl: 0    hydrALAZINE (APRESOLINE) 25 MG tablet, Take 1 tab PO TID as needed for blood pressure >160 systolic or >100 diastolic, Disp: 90 tablet, Rfl: 3    hydroCHLOROthiazide 25 MG tablet, Take 1 tablet by mouth Daily., Disp: 90 tablet, Rfl: 4    KRILL OIL PO, Take  by mouth. (Patient taking differently: Take  by mouth. 4 days a week), Disp: , Rfl:     losartan (COZAAR) 100 MG tablet, TAKE ONE TABLET BY MOUTH EVERY DAY FOR BLOOD PRESSURE, Disp: 90 tablet, Rfl: 4    Magnesium 250 MG tablet, Take  by mouth., Disp: , Rfl:     melatonin 3 MG tablet, Take 1 tablet by mouth Every Night., Disp: 90 tablet, Rfl: 3    meloxicam (MOBIC) 15 MG tablet, Take 1 tablet by mouth Daily As Needed for Moderate Pain., Disp: 90 tablet, Rfl: 3    metFORMIN (GLUCOPHAGE) 500 MG tablet, Take 2 tablets by mouth 2 (Two) Times a Day With Meals., Disp: 360 tablet, Rfl: 3    Misc Natural Products (Beet Root) 500 MG capsule, Take  by mouth., Disp: , Rfl:     Multiple Vitamins-Minerals (ZINC PO), Take  by mouth., Disp: , Rfl:     oxyCODONE-acetaminophen (Endocet)  MG per tablet, Take 1 tablet by mouth Every 12 (Twelve) Hours As Needed for Severe Pain., Disp: 30 tablet, Rfl: 0    Scopolamine (Transderm-Scop, 1.5 MG,) 1 MG/3DAYS patch, Place 1 patch on the skin as directed by provider Every 72 (Seventy-Two) Hours., Disp: 4 patch, Rfl: 3    Semaglutide, 1 MG/DOSE, (Ozempic, 1 MG/DOSE,) 4 MG/3ML solution pen-injector, Inject 1 mg under the skin into the appropriate area as directed 1 (One) Time Per Week. (Patient taking differently: Inject 1 mg under the skin into the appropriate area as directed 1 (One) Time Per Week. Has not taken in about 5/6 weeks), Disp: 9 mL, Rfl: 3    spironolactone (ALDACTONE) 25  "MG tablet, Take 1 tablet by mouth Daily., Disp: 90 tablet, Rfl: 3    tadalafil (CIALIS) 5 MG tablet, Take 1 tablet by mouth Daily As Needed for Erectile Dysfunction., Disp: , Rfl:   Allergies   Allergen Reactions    Codeine Other (See Comments)     tremors    Valsartan Angioedema    Bactrim [Sulfamethoxazole-Trimethoprim] Nausea And Vomiting    Hydrocodone-Acetaminophen Other (See Comments)     Made me real hot     Social History     Tobacco Use    Smoking status: Never     Passive exposure: Never    Smokeless tobacco: Never   Vaping Use    Vaping status: Never Used   Substance Use Topics    Alcohol use: Not Currently     Comment: occasional    Drug use: No          Objective   Physical Exam  Vitals:    03/13/25 1453   BP: 134/68   BP Location: Left arm   Patient Position: Sitting   Cuff Size: Large Adult   Pulse: 66   Temp: 97.8 °F (36.6 °C)   TempSrc: Infrared   SpO2: 97%   Weight: 132 kg (292 lb)   Height: 193 cm (76\")     Body mass index is 35.54 kg/m².    Constitutional: NAD.  Musculoskeletal: Bony deformity and tenderness to palpation at the right fifth TMT J  Psychiatric: Normal affect. Normal thought content.     Assessment & Plan   Assessment and Plan  Very pleasant 64-year-old male with hypertension, hyperlipidemia, type 2 diabetes without complication, JAKOB on CPAP, polyarthritis, BPH, GERD, BMI greater than 35, who presents for the following:      Diagnoses and all orders for this visit:    1. Essential hypertension (Primary)  -     amLODIPine (NORVASC) 10 MG tablet; Take 1 tablet by mouth Daily.  Dispense: 90 tablet; Refill: 4  -     losartan (COZAAR) 100 MG tablet; TAKE ONE TABLET BY MOUTH EVERY DAY FOR BLOOD PRESSURE  Dispense: 90 tablet; Refill: 4  -     hydroCHLOROthiazide 25 MG tablet; Take 1 tablet by mouth Daily.  Dispense: 90 tablet; Refill: 4  -     Comprehensive Metabolic Panel  -     atenolol (TENORMIN) 50 MG tablet; Take 1 tablet by mouth Daily.  Dispense: 90 tablet; Refill: 3    2. Arthritis " of right foot  -     Ambulatory Referral to Orthopedic Surgery    3. Chronic midline low back pain with right-sided sciatica  -     cyclobenzaprine (FEXMID) 7.5 MG tablet; Take 1 tablet by mouth 3 (Three) Times a Day As Needed for Muscle Spasms.  Dispense: 90 tablet; Refill: 3    4. Borderline type 2 diabetes mellitus  -     metFORMIN (GLUCOPHAGE) 500 MG tablet; Take 2 tablets by mouth 2 (Two) Times a Day With Meals.  Dispense: 360 tablet; Refill: 3    5. Mixed hyperlipidemia  -     atorvastatin (LIPITOR) 20 MG tablet; Take 1 tablet by mouth Daily.  Dispense: 90 tablet; Refill: 3    6. Labile blood pressure  -     hydrALAZINE (APRESOLINE) 25 MG tablet; Take 1 tab PO TID as needed for blood pressure >160 systolic or >100 diastolic  Dispense: 90 tablet; Refill: 3    7. Other chest pain    8. SOB (shortness of breath)    9. Benign prostatic hyperplasia with nocturia  -     PSA DIAGNOSTIC    10. Chronic pain of right knee  -     meloxicam (MOBIC) 15 MG tablet; Take 1 tablet by mouth Daily As Needed for Moderate Pain.  Dispense: 90 tablet; Refill: 3    Improved blood pressure with spironolactone, will continue and get BMP today to check potassium to ensure long-term safety    Will get PSA for upcoming urology appointment    Will get him to Dr. Dominguez for consideration of injection for likely TMT arthritis, can get x-rays then    Checked all refills and renewed them with my upcoming departure     Ramirez Hernandez MD  Family Medicine  O: [phone]    Disclaimer: Parts of this note were dictated by speech recognition. Minor errors in transcription may be present. Please call if questions.

## 2025-03-14 ENCOUNTER — HOSPITAL ENCOUNTER (OUTPATIENT)
Dept: CT IMAGING | Facility: HOSPITAL | Age: 65
Discharge: HOME | End: 2025-03-14
Admitting: INTERNAL MEDICINE

## 2025-03-14 ENCOUNTER — RESULTS FOLLOW-UP (OUTPATIENT)
Dept: CT IMAGING | Facility: HOSPITAL | Age: 65
End: 2025-03-14
Payer: COMMERCIAL

## 2025-03-14 DIAGNOSIS — I10 ESSENTIAL HYPERTENSION: ICD-10-CM

## 2025-03-14 DIAGNOSIS — R06.02 SOB (SHORTNESS OF BREATH): ICD-10-CM

## 2025-03-14 DIAGNOSIS — G47.33 OSA ON CPAP: ICD-10-CM

## 2025-03-14 DIAGNOSIS — R07.89 OTHER CHEST PAIN: ICD-10-CM

## 2025-03-14 DIAGNOSIS — E78.2 MIXED HYPERLIPIDEMIA: ICD-10-CM

## 2025-03-14 LAB
ALBUMIN SERPL-MCNC: 4.7 G/DL (ref 3.9–4.9)
ALP SERPL-CCNC: 58 IU/L (ref 44–121)
ALT SERPL-CCNC: 40 IU/L (ref 0–44)
AST SERPL-CCNC: 30 IU/L (ref 0–40)
BILIRUB SERPL-MCNC: 0.9 MG/DL (ref 0–1.2)
BUN SERPL-MCNC: 18 MG/DL (ref 8–27)
BUN/CREAT SERPL: 17 (ref 10–24)
CALCIUM SERPL-MCNC: 9.3 MG/DL (ref 8.6–10.2)
CHLORIDE SERPL-SCNC: 102 MMOL/L (ref 96–106)
CO2 SERPL-SCNC: 19 MMOL/L (ref 20–29)
CREAT SERPL-MCNC: 1.08 MG/DL (ref 0.76–1.27)
EGFRCR SERPLBLD CKD-EPI 2021: 78 ML/MIN/1.73
GLOBULIN SER CALC-MCNC: 2.2 G/DL (ref 1.5–4.5)
GLUCOSE SERPL-MCNC: 165 MG/DL (ref 70–99)
POTASSIUM SERPL-SCNC: 4 MMOL/L (ref 3.5–5.2)
PROT SERPL-MCNC: 6.9 G/DL (ref 6–8.5)
PSA SERPL-MCNC: 2.2 NG/ML (ref 0–4)
SODIUM SERPL-SCNC: 142 MMOL/L (ref 134–144)

## 2025-03-14 PROCEDURE — 75571 CT HRT W/O DYE W/CA TEST: CPT

## 2025-03-14 NOTE — PROGRESS NOTES
I am covering Dr. Mckeon's in basket today because he is out of the office.      I reviewed his note.  Patient was asymptomatic.  I will defer to Dr. Green next steps and plan of care.

## 2025-03-19 NOTE — PROGRESS NOTES
Lets make an appointment to see this patient so we can go over his coronary calcium scan and discuss this further

## 2025-03-20 ENCOUNTER — TELEPHONE (OUTPATIENT)
Age: 65
End: 2025-03-20
Payer: COMMERCIAL

## 2025-03-20 NOTE — TELEPHONE ENCOUNTER
Called and spoke with patient to get him scheduled to speak with  following his CT Calcium Score that was done. Patient was unable to schedule it due to his schedule being a bit hectic. He will call back to get scheduled. [email or web address] office number was given to him.      HUB/Schedulers: Please schedule patient to see Peter at the [place] office either Monday or Thursday as he lives in [facility] or if he's okay with traveling to the main office he can be scheduled on a Tuesday or Friday. There is no time frame but the sooner the better. Thank you !

## 2025-03-27 ENCOUNTER — OFFICE VISIT (OUTPATIENT)
Dept: ORTHOPEDIC SURGERY | Facility: CLINIC | Age: 65
End: 2025-03-27
Payer: COMMERCIAL

## 2025-03-27 VITALS — TEMPERATURE: 98.2 F | WEIGHT: 292.2 LBS | BODY MASS INDEX: 35.58 KG/M2 | HEIGHT: 76 IN

## 2025-03-27 DIAGNOSIS — M77.41 METATARSALGIA OF RIGHT FOOT: ICD-10-CM

## 2025-03-27 DIAGNOSIS — M92.61 HAGLUND'S DEFORMITY OF RIGHT HEEL: ICD-10-CM

## 2025-03-27 DIAGNOSIS — M20.11 HALLUX VALGUS OF RIGHT FOOT: ICD-10-CM

## 2025-03-27 DIAGNOSIS — M76.71 PERONEAL TENDONITIS, RIGHT: ICD-10-CM

## 2025-03-27 DIAGNOSIS — R52 PAIN: Primary | ICD-10-CM

## 2025-03-27 DIAGNOSIS — M19.071 ARTHRITIS OF RIGHT FOOT: ICD-10-CM

## 2025-03-27 DIAGNOSIS — M20.41 HAMMER TOE OF RIGHT FOOT: ICD-10-CM

## 2025-03-27 DIAGNOSIS — M65.28 CALCIFIC ACHILLES TENDONITIS: ICD-10-CM

## 2025-03-27 DIAGNOSIS — M19.071 ARTHRITIS OF FIRST METATARSOPHALANGEAL (MTP) JOINT OF RIGHT FOOT: ICD-10-CM

## 2025-03-27 PROBLEM — M76.60 CALCIFIC ACHILLES TENDONITIS: Status: ACTIVE | Noted: 2025-03-27

## 2025-03-27 NOTE — PROGRESS NOTES
New Patient Complaint      Patient: Gerard Gonzalez  YOB: 1960 64 y.o. male  Medical Record Number: [id number]    Chief Complaints: My foot hurts    History of Present Illness: Patient reports that he had issues with his right foot and ankle for 8 years with recent worsening in the last 4 weeks.  Main area of pain is at the proximal fifth metatarsal.  He reports that he has a history of ankle arthritis with some pain over the anterior anteromedial aspect of the ankle and that he is on his feet a lot as a ..  He reports the compounding cream prescribed by his PCP does help and also has been doing some stretching over a step which has helped.    He occasionally gets some pain in his midfoot that is more recent.  He does not have any pain in his toes, first MTP joint, or heel.      Patient is seen today at the request of Dr. Ramriez Hernandez who has requested my opinion regarding etiology and treatment of this condition        HPI    Allergies:   Allergies   Allergen Reactions    Codeine Other (See Comments)     tremors    Valsartan Angioedema    Bactrim [Sulfamethoxazole-Trimethoprim] Nausea And Vomiting    Hydrocodone-Acetaminophen Other (See Comments)     Made me real hot       Medications:   Current Outpatient Medications on File Prior to Visit   Medication Sig    amLODIPine (NORVASC) 10 MG tablet Take 1 tablet by mouth Daily.    aspirin 81 MG EC tablet Take 1 tablet by mouth Daily.    atenolol (TENORMIN) 50 MG tablet Take 1 tablet by mouth Daily.    atorvastatin (LIPITOR) 20 MG tablet Take 1 tablet by mouth Daily.    Cholecalciferol (VITAMIN D-3 PO) Take 2 each by mouth Daily. 2000 IU    Cinnamon 500 MG capsule Take 2 capsules by mouth Daily.    Coenzyme Q10 (CO Q 10 PO) Take by mouth.    cyclobenzaprine (FEXMID) 7.5 MG tablet Take 1 tablet by mouth 3 (Three) Times a Day As Needed for Muscle Spasms.    gabapentin (NEURONTIN) 300 MG capsule Take 1 tablet three times daily as needed for  arthritis    GLUCOSAMINE-MSM-HYALURONIC ACD PO Take  by mouth.    glucose monitor monitoring kit Use 1 each As Needed (Use to check blood sugar once daily). Please dispense covered glucometer with set of lancets and strips at a reasonable quantity #100 3rf. Thanks - IZABELA    hydrALAZINE (APRESOLINE) 25 MG tablet Take 1 tab PO TID as needed for blood pressure >160 systolic or >100 diastolic    hydroCHLOROthiazide 25 MG tablet Take 1 tablet by mouth Daily.    KRILL OIL PO Take  by mouth. (Patient taking differently: Take  by mouth. 4 days a week)    losartan (COZAAR) 100 MG tablet TAKE ONE TABLET BY MOUTH EVERY DAY FOR BLOOD PRESSURE    Magnesium 250 MG tablet Take  by mouth.    melatonin 3 MG tablet Take 1 tablet by mouth Every Night.    meloxicam (MOBIC) 15 MG tablet Take 1 tablet by mouth Daily As Needed for Moderate Pain.    metFORMIN (GLUCOPHAGE) 500 MG tablet Take 2 tablets by mouth 2 (Two) Times a Day With Meals.    Misc Natural Products (Beet Root) 500 MG capsule Take  by mouth.    Multiple Vitamins-Minerals (ZINC PO) Take  by mouth.    oxyCODONE-acetaminophen (Endocet)  MG per tablet Take 1 tablet by mouth Every 12 (Twelve) Hours As Needed for Severe Pain.    Scopolamine (Transderm-Scop, 1.5 MG,) 1 MG/3DAYS patch Place 1 patch on the skin as directed by provider Every 72 (Seventy-Two) Hours.    Semaglutide, 1 MG/DOSE, (Ozempic, 1 MG/DOSE,) 4 MG/3ML solution pen-injector Inject 1 mg under the skin into the appropriate area as directed 1 (One) Time Per Week. (Patient taking differently: Inject 1 mg under the skin into the appropriate area as directed 1 (One) Time Per Week. Has not taken in about 5/6 weeks)    spironolactone (ALDACTONE) 25 MG tablet Take 1 tablet by mouth Daily.    tadalafil (CIALIS) 5 MG tablet Take 1 tablet by mouth Daily As Needed for Erectile Dysfunction.     No current facility-administered medications on file prior to visit.       Past Medical History:   Diagnosis Date    Allergic      "Toby    Benign essential hypertension     Bruit of left carotid artery 09/13/2018    Diabetes mellitus     diet controlled    GERD (gastroesophageal reflux disease)     Headache     Hip pain, left 01/29/2016    Lymphadenopathy of head and neck 07/29/2021    Myofascial pain 10/26/2020    Pain of both elbows 08/28/2017    Sleep apnea     wears cpap    Umbilical hernia 01/29/2016     Past Surgical History:   Procedure Laterality Date    COLONOSCOPY  03/03/2016    HEMORRHOIDECTOMY      TUMOR REMOVAL      from groin, benign    UMBILICAL HERNIA REPAIR  03/03/2016     Social History     Occupational History    Not on file   Tobacco Use    Smoking status: Never     Passive exposure: Never    Smokeless tobacco: Never   Vaping Use    Vaping status: Never Used   Substance and Sexual Activity    Alcohol use: Not Currently     Comment: occasional    Drug use: No    Sexual activity: Not Currently      Social History     Social History Narrative    Not on file     Family History   Problem Relation Age of Onset    Cancer Father         prostate    Hypertension Other     Hypertension Mother     Cancer Paternal Uncle     Cancer Paternal Grandfather        Review of Systems: 14 point review of systems performed, positive pertinent findings identified in HPI. All remaining systems negative     Review of Systems      Physical Exam:   Vitals:    03/27/25 1450   Temp: 98.2 °F (36.8 °C)   Weight: 133 kg (292 lb 3.2 oz)   Height: 193 cm (76\")   PainSc: 6      Physical Exam   Constitutional: pleasant, well developed   Eyes: sclera non icteric  Hearing : adequate for exam  Cardiovascular: palpable pulses in right foot, right calf/ thigh NT without sign of DVT  Respiratoy: breathing unlabored   Neurological: grossly sensate to LT throughout right LE  Psychiatric: oriented with normal mood and affect.   Lymphatic: No palpable popliteal lymphadenopathy right LE  Skin: intact throughout right leg/foot  Musculoskeletal: Exam he has neutral " dorsiflexion of the heel cord.  There is mild bony prominence at the insertion of the Achilles but no focal tenderness.  There is mild prominence at the base of the fifth metatarsal with no bursal formation mild tenderness palpation there in the distal extent of the peroneus brevis but good eversion strength without exacerbation of pain.  There is arthritic change around the first MTP joint but no focal tenderness and minimal with any tenderness over the dorsum of the midfoot.  Mild discomfort of the anterior aspect of the ankle and slight discomfort with range of motion.  Minimal hammering of the lesser toes  Physical Exam  Ortho Exam    Radiology: 3 views right foot ordered evaluate pain and alignment reviewed and no prior x-rays available for comparison for moderate arthritic change with hallux valgus for the first MTP joint.  There is arthritis with spurring over the lateral aspect of the calcaneocuboid joint as well as large plantar calcaneal spur and posterior calcaneal spurring with prominent superior calcaneal tuberosity and spurring over the dorsum of the talonavicular joint.  There is appears to be hammering of the second third fourth and fifth toes.  I do not see any clear fracture at the base of the fifth metatarsal    Assessment/Plan: 1.  Right proximal fifth metatarsalgia with possible insertional peroneal tendinitis  2.  Right ankle arthritis  3.  Right midfoot arthritis  4.  Right hallux valgus with first MTP arthritis 5.  Minimal right hammertoes    We discussed treatment going forward and main thing is bothering him is the base of the fifth metatarsal.    We discussed the MRI for further evaluation of the peroneal brevis insertion and proximal fifth metatarsal for any interstitial tear or stress fracture and he wanted to hold off on that for now.  I think this is reasonable given that the stretching and compounding cream have helped.     the insertional Achilles calcification is not particular  symptomatic we may be contributing to some tightness with there open with midfoot and forefoot overload.  He was advised on heel cord stretching exercises to do 4 times a day.  Instruction sheet was provided and demonstrated for him and counseled avoid stretching over a step to minimize pressure on his foot.    He was also fitted with a lateral heel wedge to offload the lateral foot    He will continue with the compounding cream that he has been prescribing and he is not sure exactly what it was but told him to let me know or bring it with him next time and I could refill that if needed.    Will plan to see him back in 6 weeks but if not making any improvement in the next 3 weeks he will let us know suite get an MRI of his right hindfoot including his midfoot and proximal forefoot prior to follow-up.

## 2025-03-28 ENCOUNTER — OFFICE VISIT (OUTPATIENT)
Dept: CARDIOLOGY | Age: 65
End: 2025-03-28
Payer: COMMERCIAL

## 2025-03-28 VITALS
BODY MASS INDEX: 35.31 KG/M2 | DIASTOLIC BLOOD PRESSURE: 74 MMHG | OXYGEN SATURATION: 97 % | WEIGHT: 290 LBS | HEIGHT: 76 IN | HEART RATE: 67 BPM | SYSTOLIC BLOOD PRESSURE: 128 MMHG

## 2025-03-28 DIAGNOSIS — R94.31 ABNORMAL ECG: ICD-10-CM

## 2025-03-28 DIAGNOSIS — R07.89 OTHER CHEST PAIN: ICD-10-CM

## 2025-03-28 DIAGNOSIS — I25.10 CORONARY ARTERIOSCLEROSIS: Primary | ICD-10-CM

## 2025-03-28 PROCEDURE — 99214 OFFICE O/P EST MOD 30 MIN: CPT | Performed by: INTERNAL MEDICINE

## 2025-03-28 NOTE — PROGRESS NOTES
"    Subjective:     Encounter Date:03/28/25      Patient ID: Gerard Gonzalez is a 64 y.o. male.    Chief Complaint:  History of Present Illness    Dear  Dr. Hernandez,    I had the pleasure of seeing this patient in the office today.  Comes in for follow-up after his recent coronary calcium scan.    He does note he had a couple episodes where he had some mild left inframammary discomfort.  He really did not think a whole lot of it, but after he saw the results on his coronary calcium scan he wondered whether there was not more to it.    Findings:  Agatston scores for individual coronary arteries are as follows:  Left main (LM): 81  Left anterior descending (LAD): 565  Left circumflex (CX): 77  Right coronary artery (RCA): 314  Posterior descending artery (PDA): 135  Other: 3  Total: 1175     There is cardiomegaly. No pericardial effusion. No suspicious pulmonary nodule or mass. No suspicious mediastinal mass or lymphadenopathy. Included upper abdomen is within normal limits. No fracture or suspicious bone lesion..     IMPRESSION:  Impression:  Calcium score indicates extensive coronary artery atherosclerotic plaque burden with a high risk of cardiac events in the next 5 years.    He says that with the addition of spironolactone his blood pressure has been under good control and that is doing much better.    The following portions of the patient's history were reviewed and updated as appropriate: allergies, current medications, past family history, past medical history, past social history, past surgical history and problem list.    Procedures       Objective:     Vitals:    03/28/25 1434   BP: 128/74   BP Location: Right arm   Patient Position: Sitting   Cuff Size: Adult   Pulse: 67   SpO2: 97%   Weight: 132 kg (290 lb)   Height: 193 cm (76\")     Body mass index is 35.3 kg/m².      Vitals reviewed.   Constitutional:       General: Not in acute distress.     Appearance: Well-developed. Not diaphoretic.   Eyes:      " "General:         Right eye: No discharge.         Left eye: No discharge.      Conjunctiva/sclera: Conjunctivae normal.   HENT:      Head: Normocephalic and atraumatic.      Nose: Nose normal.   Neck:      Thyroid: No thyromegaly.      Trachea: No tracheal deviation.   Pulmonary:      Effort: Pulmonary effort is normal. No respiratory distress.      Breath sounds: Normal breath sounds. No stridor.   Chest:      Chest wall: Not tender to palpatation.   Cardiovascular:      Normal rate. Regular rhythm.      Murmurs: There is no murmur.      . No S3 gallop. No click. No rub.   Pulses:     Intact distal pulses.   Edema:     Peripheral edema absent.   Abdominal:      General: Bowel sounds are normal. There is no distension.      Palpations: Abdomen is soft. There is no abdominal mass.   Musculoskeletal: Normal range of motion.         General: No tenderness or deformity.      Cervical back: Normal range of motion and neck supple. Skin:     General: Skin is warm and dry.      Findings: No erythema or rash.   Neurological:      Mental Status: Alert.   Psychiatric:         Attention and Perception: Attention normal.         Data and records reviewed:     Lab Results   Component Value Date    GLUCOSE 165 (H) 03/13/2025    BUN 18 03/13/2025    CREATININE 1.08 03/13/2025     03/13/2025    K 4.0 03/13/2025     03/13/2025    CALCIUM 9.3 03/13/2025    PROTEINTOT 6.9 03/13/2025    ALBUMIN 4.7 03/13/2025    ALT 40 03/13/2025    AST 30 03/13/2025    ALKPHOS 58 03/13/2025    BILITOT 0.9 03/13/2025    GLOB 2.2 03/13/2025    AGRATIO 2.1 02/20/2024    BCR 17 03/13/2025    ANIONGAP 9.1 11/09/2023    EGFR 78 03/13/2025     No results found for: \"CHOL\"  Lab Results   Component Value Date    TRIG 165 (H) 10/31/2024    TRIG 104 02/20/2024    TRIG 130 09/15/2023     Lab Results   Component Value Date    HDL 37 (L) 10/31/2024    HDL 35 (L) 02/20/2024    HDL 33 (L) 09/15/2023     Lab Results   Component Value Date    LDL 52 " 10/31/2024    LDL 34 02/20/2024    LDL 58 09/15/2023     Lab Results   Component Value Date    VLDL 28 10/31/2024    VLDL 20 02/20/2024    VLDL 23 09/15/2023     Lab Results   Component Value Date    LDLHDL 3.41 04/26/2017    LDLHDL 3.5 12/21/2015     CBC          10/31/2024    11:36   CBC   WBC 4.4    RBC 4.79    Hemoglobin 14.6    Hematocrit 43.6    MCV 91    MCH 30.5    MCHC 33.5    RDW 12.4    Platelets 189      XR Foot 3+ View Right  Result Date: 3/27/2025  Ordering physician's impression is located in the Encounter Note dated 03/27/25. X-ray performed in the DR room.     CT Cardiac Calcium Score Without Dye  Result Date: 3/14/2025  Impression: Calcium score indicates extensive coronary artery atherosclerotic plaque burden with a high risk of cardiac events in the next 5 years. Cardiomegaly. Calcium Score Interpretation 0 -- Negative examination, no identifiable atherosclerotic plaque.  Very low risk of cardiac events in the next 5 years. 1-10 -- Minimal plaque burden.  Low risk of cardiac events in the next 5 years. 11- 100 -- Mild Plaque burden.  Mild risk of cardiac events in the next 5 years. 101 - 400 -- Moderate plaque burden.  Moderate risk of cardiac events in the next 5 years. Over 400 -- Extensive plaque burden.  High risk of cardiac events in the next 5 years. Electronically Signed: Ahmet Cote MD  3/14/2025 9:51 AM EDT  Workstation ID: [id number]    Results for orders placed in visit on 01/23/25    Adult Transthoracic Echo Complete W/ Cont if Necessary Per Protocol    Interpretation Summary    Left ventricular systolic function is normal. Calculated left ventricular EF = 69.9% Normal left ventricular cavity size and wall thickness noted. All left ventricular wall segments contract normally. Left ventricular diastolic function was normal.    The left atrial cavity is severely dilated.  The right atrial cavity is mildly dilated    There is moderate thickening of the aortic valve. The aortic valve  appears trileaflet. Trace aortic valve regurgitation is present. No aortic valve stenosis is present.    Mild mitral valve regurgitation is present.    Trace tricuspid valve regurgitation is present. Estimated right ventricular systolic pressure from tricuspid regurgitation is normal (<35 mmHg). Calculated right ventricular systolic pressure from tricuspid regurgitation is 20.0 mmHg.          Assessment:          Diagnosis Plan   1. Cerebral atherosclerosis        2. Coronary arteriosclerosis  Stress Test With Myocardial Perfusion One Day      3. Other chest pain  Stress Test With Myocardial Perfusion One Day      4. Abnormal ECG  Stress Test With Myocardial Perfusion One Day               Plan:       1.  Hypertension-improved, continue same  2.  Coronary arteriosclerosis, coronary calcium score of 1175 consistent with extensive coronary artery atherosclerotic plaque.  High risk study.  At this point we will arrange for an ischemic evaluation.  Nonspecific abnormal EKG noted.    Thank you very much for allowing us to participate in the care of this pleasant patient.  Please don't hesitate to call if I can be of assistance in any way.      Current Outpatient Medications:     amLODIPine (NORVASC) 10 MG tablet, Take 1 tablet by mouth Daily., Disp: 90 tablet, Rfl: 4    aspirin 81 MG EC tablet, Take 1 tablet by mouth Daily., Disp: , Rfl:     atenolol (TENORMIN) 50 MG tablet, Take 1 tablet by mouth Daily., Disp: 90 tablet, Rfl: 3    atorvastatin (LIPITOR) 20 MG tablet, Take 1 tablet by mouth Daily., Disp: 90 tablet, Rfl: 3    Cholecalciferol (VITAMIN D-3 PO), Take 2 each by mouth Daily. 2000 IU, Disp: , Rfl:     Cinnamon 500 MG capsule, Take 2 capsules by mouth Daily., Disp: , Rfl:     Coenzyme Q10 (CO Q 10 PO), Take by mouth., Disp: , Rfl:     cyclobenzaprine (FEXMID) 7.5 MG tablet, Take 1 tablet by mouth 3 (Three) Times a Day As Needed for Muscle Spasms., Disp: 90 tablet, Rfl: 3    gabapentin (NEURONTIN) 300 MG capsule,  Take 1 tablet three times daily as needed for arthritis, Disp: 30 capsule, Rfl: 0    GLUCOSAMINE-MSM-HYALURONIC ACD PO, Take  by mouth., Disp: , Rfl:     glucose monitor monitoring kit, Use 1 each As Needed (Use to check blood sugar once daily). Please dispense covered glucometer with set of lancets and strips at a reasonable quantity #100 3rf. Thanks - SW, Disp: 1 each, Rfl: 0    hydrALAZINE (APRESOLINE) 25 MG tablet, Take 1 tab PO TID as needed for blood pressure >160 systolic or >100 diastolic, Disp: 90 tablet, Rfl: 3    hydroCHLOROthiazide 25 MG tablet, Take 1 tablet by mouth Daily., Disp: 90 tablet, Rfl: 4    KRILL OIL PO, Take  by mouth. (Patient taking differently: Take  by mouth. 4 days a week), Disp: , Rfl:     losartan (COZAAR) 100 MG tablet, TAKE ONE TABLET BY MOUTH EVERY DAY FOR BLOOD PRESSURE, Disp: 90 tablet, Rfl: 4    Magnesium 250 MG tablet, Take  by mouth., Disp: , Rfl:     melatonin 3 MG tablet, Take 1 tablet by mouth Every Night., Disp: 90 tablet, Rfl: 3    meloxicam (MOBIC) 15 MG tablet, Take 1 tablet by mouth Daily As Needed for Moderate Pain., Disp: 90 tablet, Rfl: 3    metFORMIN (GLUCOPHAGE) 500 MG tablet, Take 2 tablets by mouth 2 (Two) Times a Day With Meals., Disp: 360 tablet, Rfl: 3    Misc Natural Products (Beet Root) 500 MG capsule, Take  by mouth., Disp: , Rfl:     Multiple Vitamins-Minerals (ZINC PO), Take  by mouth., Disp: , Rfl:     oxyCODONE-acetaminophen (Endocet)  MG per tablet, Take 1 tablet by mouth Every 12 (Twelve) Hours As Needed for Severe Pain., Disp: 30 tablet, Rfl: 0    Scopolamine (Transderm-Scop, 1.5 MG,) 1 MG/3DAYS patch, Place 1 patch on the skin as directed by provider Every 72 (Seventy-Two) Hours., Disp: 4 patch, Rfl: 3    Semaglutide, 1 MG/DOSE, (Ozempic, 1 MG/DOSE,) 4 MG/3ML solution pen-injector, Inject 1 mg under the skin into the appropriate area as directed 1 (One) Time Per Week. (Patient taking differently: Inject 1 mg under the skin into the  appropriate area as directed 1 (One) Time Per Week. Has not taken in about 5/6 weeks), Disp: 9 mL, Rfl: 3    spironolactone (ALDACTONE) 25 MG tablet, Take 1 tablet by mouth Daily., Disp: 90 tablet, Rfl: 3    tadalafil (CIALIS) 5 MG tablet, Take 1 tablet by mouth Daily As Needed for Erectile Dysfunction., Disp: , Rfl:          No follow-ups on file.

## 2025-04-24 ENCOUNTER — TELEPHONE (OUTPATIENT)
Dept: CARDIOLOGY | Age: 65
End: 2025-04-24
Payer: COMMERCIAL

## 2025-04-25 ENCOUNTER — HOSPITAL ENCOUNTER (OUTPATIENT)
Dept: CARDIOLOGY | Facility: HOSPITAL | Age: 65
Discharge: HOME | End: 2025-04-25
Admitting: INTERNAL MEDICINE
Payer: COMMERCIAL

## 2025-04-25 VITALS — HEIGHT: 76 IN | WEIGHT: 291.01 LBS | BODY MASS INDEX: 35.44 KG/M2

## 2025-04-25 DIAGNOSIS — I25.10 CORONARY ARTERIOSCLEROSIS: ICD-10-CM

## 2025-04-25 DIAGNOSIS — R07.89 OTHER CHEST PAIN: ICD-10-CM

## 2025-04-25 DIAGNOSIS — R94.31 ABNORMAL ECG: ICD-10-CM

## 2025-04-25 LAB
BH CV NUCLEAR PRIOR STUDY: 2
BH CV REST NUCLEAR ISOTOPE DOSE: 11.2 MCI
BH CV STRESS BP STAGE 1: NORMAL
BH CV STRESS BP STAGE 2: NORMAL
BH CV STRESS BP STAGE 3: NORMAL
BH CV STRESS DURATION MIN STAGE 1: 3
BH CV STRESS DURATION MIN STAGE 2: 3
BH CV STRESS DURATION MIN STAGE 3: 2
BH CV STRESS DURATION SEC STAGE 1: 0
BH CV STRESS DURATION SEC STAGE 2: 0
BH CV STRESS DURATION SEC STAGE 3: 0
BH CV STRESS GRADE STAGE 1: 10
BH CV STRESS GRADE STAGE 2: 12
BH CV STRESS GRADE STAGE 3: 14
BH CV STRESS HR STAGE 1: 97
BH CV STRESS HR STAGE 2: 120
BH CV STRESS HR STAGE 3: 141
BH CV STRESS METS STAGE 1: 5
BH CV STRESS METS STAGE 2: 7.5
BH CV STRESS METS STAGE 3: 9
BH CV STRESS NUCLEAR ISOTOPE DOSE: 34.4 MCI
BH CV STRESS PROTOCOL 1: NORMAL
BH CV STRESS RECOVERY BP: NORMAL MMHG
BH CV STRESS RECOVERY HR: 75 BPM
BH CV STRESS SPEED STAGE 1: 1.7
BH CV STRESS SPEED STAGE 2: 2.5
BH CV STRESS SPEED STAGE 3: 3.4
BH CV STRESS STAGE 1: 1
BH CV STRESS STAGE 2: 2
BH CV STRESS STAGE 3: 3
MAXIMAL PREDICTED HEART RATE: 158 BPM
PERCENT MAX PREDICTED HR: 89.24 %
SPECT HRT GATED+EF W RNC IV: 44 %
STRESS BASELINE BP: NORMAL MMHG
STRESS BASELINE HR: 64 BPM
STRESS PERCENT HR: 105 %
STRESS POST ESTIMATED WORKLOAD: 9 METS
STRESS POST EXERCISE DUR MIN: 8 MIN
STRESS POST EXERCISE DUR SEC: 0 SEC
STRESS POST PEAK BP: NORMAL MMHG
STRESS POST PEAK HR: 141 BPM
STRESS TARGET HR: 134 BPM

## 2025-04-25 PROCEDURE — 78452 HT MUSCLE IMAGE SPECT MULT: CPT

## 2025-04-25 PROCEDURE — 93017 CV STRESS TEST TRACING ONLY: CPT

## 2025-04-25 PROCEDURE — A9502 TC99M TETROFOSMIN: HCPCS | Performed by: INTERNAL MEDICINE

## 2025-04-25 PROCEDURE — 34310000005 TECHNETIUM TETROFOSMIN KIT: Performed by: INTERNAL MEDICINE

## 2025-04-25 RX ADMIN — TETROFOSMIN 1 DOSE: 1.38 INJECTION, POWDER, LYOPHILIZED, FOR SOLUTION INTRAVENOUS at 07:57

## 2025-04-25 RX ADMIN — TETROFOSMIN 1 DOSE: 1.38 INJECTION, POWDER, LYOPHILIZED, FOR SOLUTION INTRAVENOUS at 08:45

## 2025-06-16 ENCOUNTER — OFFICE VISIT (OUTPATIENT)
Dept: INTERNAL MEDICINE | Facility: CLINIC | Age: 65
End: 2025-06-16
Payer: COMMERCIAL

## 2025-06-16 VITALS
TEMPERATURE: 100 F | DIASTOLIC BLOOD PRESSURE: 70 MMHG | SYSTOLIC BLOOD PRESSURE: 132 MMHG | OXYGEN SATURATION: 98 % | HEART RATE: 69 BPM

## 2025-06-16 DIAGNOSIS — Z76.89 ENCOUNTER TO ESTABLISH CARE: ICD-10-CM

## 2025-06-16 DIAGNOSIS — J02.9 SORE THROAT: Primary | ICD-10-CM

## 2025-06-16 DIAGNOSIS — J06.9 VIRAL UPPER RESPIRATORY TRACT INFECTION: ICD-10-CM

## 2025-06-16 LAB
EXPIRATION DATE: NORMAL
FLUAV AG UPPER RESP QL IA.RAPID: NOT DETECTED
FLUBV AG UPPER RESP QL IA.RAPID: NOT DETECTED
INTERNAL CONTROL: NORMAL
Lab: NORMAL
SARS-COV-2 AG UPPER RESP QL IA.RAPID: NOT DETECTED

## 2025-06-16 PROCEDURE — 99213 OFFICE O/P EST LOW 20 MIN: CPT | Performed by: NURSE PRACTITIONER

## 2025-06-16 PROCEDURE — 87428 SARSCOV & INF VIR A&B AG IA: CPT | Performed by: NURSE PRACTITIONER

## 2025-06-16 NOTE — PROGRESS NOTES
"Chief Complaint  Cough (C/o scratchy throat, cough x 2 days)    Subjective        Gerard Gonzalez presents to [facility]  History of Present Illness  Here to establish care and for URI symptoms for about 1 week.   Having ear pain and pressure. Took a dose of Coricdin once last night and once last night. Some mild cough with some thick mucous, no wheezing. No runny nose. Not taking an antihistamine. Does use daily fluticasone (FLONASE).       Objective   Vital Signs:  /70 (BP Location: Left arm, Patient Position: Sitting, Cuff Size: Large Adult)   Pulse 69   Temp 100 °F (37.8 °C) (Infrared)   SpO2 98%   Estimated body mass index is 35.44 kg/m² as calculated from the following:    Height as of 4/25/25: 193 cm (75.98\").    Weight as of 4/25/25: 132 kg (291 lb 0.1 oz).               Physical Exam  Vitals and nursing note reviewed.   Constitutional:       General: He is not in acute distress.     Appearance: Normal appearance. He is not ill-appearing, toxic-appearing or diaphoretic.   HENT:      Right Ear: A middle ear effusion (cloudy, thin white) is present. Tympanic membrane is not scarred, perforated, erythematous, retracted or bulging.      Left Ear: A middle ear effusion (cloudy, thin and white) is present. Tympanic membrane is not scarred, perforated, erythematous, retracted or bulging.      Nose: Congestion present. No rhinorrhea.      Right Sinus: Maxillary sinus tenderness (described at tender) and frontal sinus tenderness (described at tender) present.      Left Sinus: Maxillary sinus tenderness (described at tender) and frontal sinus tenderness (described at tender) present.      Mouth/Throat:      Mouth: Mucous membranes are moist.      Pharynx: Oropharynx is clear.   Cardiovascular:      Rate and Rhythm: Normal rate and regular rhythm.   Pulmonary:      Breath sounds: Decreased breath sounds (all lobes) present. No wheezing, rhonchi or rales.   Lymphadenopathy:      " Head:      Right side of head: No submental, submandibular, tonsillar, preauricular, posterior auricular or occipital adenopathy.      Left side of head: No submental, submandibular, tonsillar, preauricular, posterior auricular or occipital adenopathy.      Cervical: No cervical adenopathy.      Right cervical: No superficial, deep or posterior cervical adenopathy.     Left cervical: No superficial, deep or posterior cervical adenopathy.      Upper Body:      Right upper body: No supraclavicular adenopathy.      Left upper body: No supraclavicular adenopathy.   Neurological:      General: No focal deficit present.      Mental Status: He is alert and oriented to person, place, and time. Mental status is at baseline.        Result Review :          Results for orders placed or performed in visit on 06/16/25   POCT SARS-CoV-2 + Flu Antigen MARK    Collection Time: 06/16/25 11:57 AM    Specimen: Swab   Result Value Ref Range    SARS Antigen Not Detected Not Detected, Presumptive Negative    Influenza A Antigen MARK Not Detected Not Detected    Influenza B Antigen MARK Not Detected Not Detected    Internal Control Passed Passed    Lot Number [id number]     Expiration Date 11/14/2025                 Assessment and Plan   Patient is a pleasant 64 year-old male with multiple co-morbidities here to establish care and further evaluation URI symptoms most likely viral.         Diagnoses and all orders for this visit:    1. Sore throat (Primary)  -     POCT SARS-CoV-2 + Flu Antigen MARK    2. Encounter to establish care    3. Viral upper respiratory tract infection    Take OTC medication, including antihistamine.   Follow up in 5 days if not improvement or symptoms worsen.          Follow Up   No follow-ups on file.  Patient was given instructions and counseling regarding his condition or for health maintenance advice. Please see specific information pulled into the AVS if appropriate.

## 2025-06-18 ENCOUNTER — TELEPHONE (OUTPATIENT)
Dept: INTERNAL MEDICINE | Facility: CLINIC | Age: 65
End: 2025-06-18
Payer: COMMERCIAL

## 2025-06-18 DIAGNOSIS — J06.9 UPPER RESPIRATORY TRACT INFECTION, UNSPECIFIED TYPE: Primary | ICD-10-CM

## 2025-06-18 RX ORDER — AZITHROMYCIN 250 MG/1
TABLET, FILM COATED ORAL
Qty: 6 TABLET | Refills: 0 | Status: SHIPPED | OUTPATIENT
Start: 2025-06-18

## 2025-06-18 NOTE — TELEPHONE ENCOUNTER
Caller: Gerard Gonzalez    Relationship to patient: Self    Best call back number:     [phone]       Patient is needing: PATIENT REPORTS STILL HAVING A FEVER, SINUS CONGESTION, AND COUGH. [employer] INSTRUCTED HIM TO SEND A [employer] MESSAGE IF HE WAS NOT GETTING BETTER, AND HE IS UNABLE TO DO SO SO HE CALLED IN INSTEAD.

## 2025-06-21 ENCOUNTER — PATIENT ROUNDING (BHMG ONLY) (OUTPATIENT)
Dept: INTERNAL MEDICINE | Facility: CLINIC | Age: 65
End: 2025-06-21
Payer: COMMERCIAL

## 2025-06-21 NOTE — PROGRESS NOTES
A My-Chart message has been sent to the patient for [place] [facility]